# Patient Record
Sex: MALE | Race: WHITE | NOT HISPANIC OR LATINO | Employment: FULL TIME | URBAN - METROPOLITAN AREA
[De-identification: names, ages, dates, MRNs, and addresses within clinical notes are randomized per-mention and may not be internally consistent; named-entity substitution may affect disease eponyms.]

---

## 2017-01-13 ENCOUNTER — GENERIC CONVERSION - ENCOUNTER (OUTPATIENT)
Dept: OTHER | Facility: OTHER | Age: 47
End: 2017-01-13

## 2017-05-18 ENCOUNTER — ALLSCRIPTS OFFICE VISIT (OUTPATIENT)
Dept: OTHER | Facility: OTHER | Age: 47
End: 2017-05-18

## 2017-06-21 ENCOUNTER — ALLSCRIPTS OFFICE VISIT (OUTPATIENT)
Dept: OTHER | Facility: OTHER | Age: 47
End: 2017-06-21

## 2017-10-18 ENCOUNTER — GENERIC CONVERSION - ENCOUNTER (OUTPATIENT)
Dept: OTHER | Facility: OTHER | Age: 47
End: 2017-10-18

## 2017-11-21 ENCOUNTER — ALLSCRIPTS OFFICE VISIT (OUTPATIENT)
Dept: OTHER | Facility: OTHER | Age: 47
End: 2017-11-21

## 2017-11-21 DIAGNOSIS — M25.512 PAIN IN LEFT SHOULDER: ICD-10-CM

## 2017-11-22 NOTE — PROGRESS NOTES
Assessment    1  Mixed hyperlipidemia (272 2) (E78 2)   2  Impaired fasting glucose (790 21) (R73 01)   3  Pain in joint of left shoulder (719 41) (M25 512)   4  Chronic narcotic use (305 50) (F11 90)   5  Overweight (BMI 25 0-29 9) (278 02) (E66 3)   6  Hair loss (704 00) (L65 9)   7  Cold sore (054 9) (B00 1)    Plan  Cold sore    · ValACYclovir HCl - 1 GM Oral Tablet (Valtrex); Take 2 tablets by mouth  every 12hours  Hair loss    · Finasteride 1 MG Oral Tablet; TAKE 1 TABLET DAILY  Health Maintenance    · *VB-Depression Screening; Status:Complete;   Done: 51EBM0062 09:39AM  Pain in joint of left shoulder    · Hydrocodone-Acetaminophen 7 5-325 MG Oral Tablet; TAKE 1 TABLET Everytwelve hours PRN pain   · 1 - Stan WREN, Jenn Mark  (Orthopedic Surgery) Co-Management  *  Status: Active Requested for: 78ZBM4103  Care Summary provided  : Yes   · * XR SHOULDER 2+ VIEW LEFT; Status:Active; Requested for:21Nov2017;   SocHx: Chronic narcotic use, Impaired fasting glucose, Mixed hyperlipidemia,Overweight (BMI 25 0-29 9), Pain in joint of left shoulder    · (1) CBC/PLT/DIFF; Status:Active; Requested for:11May2018;    · (1) COMPREHENSIVE METABOLIC PANEL; Status:Active; Requested for:11May2018;    · (1) DRUG ABUSE SCREEN, URINE ROUTINE; Status:Active; Requested for:11May2018;    · (1) HEMOGLOBIN A1C; Status:Active; Requested for:11May2018;    · (1) HYDROCODONE AND METABOLITES, URINE; Status:Active; Requestedfor:11May2018;    · (1) LIPID PANEL FASTING W DIRECT LDL REFLEX; Status:Active; Requestedfor:11May2018; Discussion/Summary    Will check left shoulder xray and refer to Dr Ksenia Beckman  lab done on 10/13/2017 at [facility] vkkwsm458/307/51/141 elevated  Low fat diet  0 94 normalnormalnormalcurrent meds  flu shot  checked and it was appropriate  Pain agreement signed  in 6 months  Possible side effects of new medications were reviewed with the patient/guardian today  The treatment plan was reviewed with the patient/guardian   The patient/guardian understands and agrees with the treatment plan      Chief Complaint  Patient presents for a 6 month follow up      History of Present Illness  Pt is here by himself  control  Not on any statins  hgA1C 5 3 ysjehc58 7 today  loss---He is on finasteride for years which works OK  sore---use valtrex as needed  Not everyday  shoulder pain for years  The pain was in left anterior shoulder  It is constant, 8/10, takes vicodin 2 tabs per day and the pain will be 4/10, so he can function  Occasionally weakness/numbness/tingling of hands after exercise  of left shoulder surgery twice  Would like to see Dr Ksenia Beckman  every day smoking, alcohol or drugs  depression  Review of Systems   Constitutional: No fever or chills, feels well, no tiredness, no recent weight gain or weight loss  ENT: no complaints of earache, no hearing loss, no nosebleeds, no nasal discharge, no sore throat, no hoarseness  Cardiovascular: No complaints of slow heart rate, no fast heart rate, no chest pain, no palpitations, no leg claudication, no lower extremity  Respiratory: No complaints of shortness of breath, no wheezing, no cough, no SOB on exertion, no orthopnea or PND  Gastrointestinal: No complaints of abdominal pain, no constipation, no nausea or vomiting, no diarrhea or bloody stools  Musculoskeletal: No complaints of arthralgia, no myalgias, no joint swelling or stiffness, no limb pain or swelling  Active Problems  1  Chronic narcotic use (305 50) (F11 90)   2  Cold sore (054 9) (B00 1)   3  Hair loss (704 00) (L65 9)   4  Impaired fasting glucose (790 21) (R73 01)   5  Mixed hyperlipidemia (272 2) (E78 2)   6  Overweight (BMI 25 0-29 9) (278 02) (E66 3)   7  Pain in joint of left shoulder (719 41) (M25 512)   8  Pain medication agreement completed (V58 69) (Z02 89)    Family History  Mother    1  No pertinent family history  Father    2   No pertinent family history    Social History     · Former smoker (V15 82) (W62 700)    Current Meds   1  Amoxicillin-Pot Clavulanate 875-125 MG Oral Tablet; TAKE 1 TABLET EVERY 12 HOURS DAILY; Therapy: 21Jun2017 to (Chelsi Ramirezcorwin)  Requested for: 21Jun2017; Last GZ:19PSJ9120 Ordered   2  Finasteride 1 MG Oral Tablet; TAKE 1 TABLET DAILY; Therapy: 61GJC3027 to (Evaluate:14Nov2017)  Requested for: 48OHN1271; Last Rx:22Shg1269 Ordered   3  Hydrocodone-Acetaminophen 7 5-325 MG Oral Tablet; TAKE 1 TABLET Every twelve hours PRN pain; Therapy: 79NXX0862 to (Evaluate:47Oxp6466); Last Rx:16Nov2017 Ordered   4  ValACYclovir HCl - 1 GM Oral Tablet; Take 2 tablets by mouth  every 12 hours; Therapy: 93BNA6797 to (Evaluate:30Oct2017)  Requested for: 30Oct2017; Last Rx:30Oct2017 Ordered    Allergies  1  No Known Drug Allergies    Vitals  Vital Signs    Recorded: 21Nov2017 09:05AM   Temperature 97 9 F, Tympanic   Heart Rate 78, R Radial   Pulse Quality Regular, R Radial   Respiration Quality Rapid   Respiration 16   Systolic 970, LUE, Sitting   Diastolic 78, LUE, Sitting   Height 5 ft 10 in   Weight 207 lb    BMI Calculated 29 7   BSA Calculated 2 12   Pain Scale 0       Physical Exam   Constitutional  General appearance: No acute distress, well appearing and well nourished  Pulmonary  Respiratory effort: No increased work of breathing or signs of respiratory distress  Auscultation of lungs: Clear to auscultation, equal breath sounds bilaterally, no wheezes, no rales, no rhonci  Cardiovascular  Auscultation of heart: Normal rate and rhythm, normal S1 and S2, without murmurs  Examination of extremities for edema and/or varicosities: Normal    Carotid pulses: Normal    Abdomen  Abdomen: Non-tender, no masses  Liver and spleen: No hepatomegaly or splenomegaly  Lymphatic  Palpation of lymph nodes in neck: No lymphadenopathy  Musculoskeletal  Gait and station: Normal    Inspection/palpation of joints, bones, and muscles: Abnormal  -- Left shoulder anterior tender, no swollen or erythema  ROM limited  Signatures   Electronically signed by :  Moshe Michelle MD; Nov 21 2017  9:41AM EST                       (Author)

## 2017-12-18 ENCOUNTER — TRANSCRIBE ORDERS (OUTPATIENT)
Dept: ADMINISTRATIVE | Facility: HOSPITAL | Age: 47
End: 2017-12-18

## 2017-12-18 DIAGNOSIS — G89.29 CHRONIC LEFT SHOULDER PAIN: Primary | ICD-10-CM

## 2017-12-18 DIAGNOSIS — M25.512 CHRONIC LEFT SHOULDER PAIN: Primary | ICD-10-CM

## 2017-12-26 ENCOUNTER — GENERIC CONVERSION - ENCOUNTER (OUTPATIENT)
Dept: OTHER | Facility: OTHER | Age: 47
End: 2017-12-26

## 2017-12-26 ENCOUNTER — HOSPITAL ENCOUNTER (OUTPATIENT)
Dept: MRI IMAGING | Facility: HOSPITAL | Age: 47
Discharge: HOME | End: 2017-12-26
Payer: COMMERCIAL

## 2017-12-26 DIAGNOSIS — M25.512 CHRONIC LEFT SHOULDER PAIN: ICD-10-CM

## 2017-12-26 DIAGNOSIS — G89.29 CHRONIC LEFT SHOULDER PAIN: ICD-10-CM

## 2017-12-26 PROCEDURE — 73221 MRI JOINT UPR EXTREM W/O DYE: CPT

## 2018-01-03 ENCOUNTER — ALLSCRIPTS OFFICE VISIT (OUTPATIENT)
Dept: OTHER | Facility: OTHER | Age: 48
End: 2018-01-03

## 2018-01-04 NOTE — PROGRESS NOTES
Assessment   1  Superior glenoid labrum lesion of left shoulder, initial encounter (840 7) (P02 035A)    Plan      Una Ivan has a significant tear in the left shoulder superior labrum which I feel is necessary to be corrected with surgery  We discussed the  arthroscopic Biceps tenodesis procedure in detail  Typically in [place] 1574 age group patients will respond better with the tenodesis verses a labral repair and this was included in our discussion  Risks of the surgery are inclusive of but not limited to bleeding, nerve injury, infection, stiffness, persistent weakness, worsening of symptoms, need for further surgery, failure of achieving full strength and ability, and blood clot  Basal stated he understood the risks and benefits of the surgery  he does not want to make a decision now but will contact our office should he decide to have the surgery  He will meet with Ashlee Garcia our surgery scheduler today to least have contact information should he decide to move forward  Discussion/Summary   The patient was counseled regarding diagnostic results,-- instructions for management,-- risk factor reductions,-- prognosis,-- patient and family education,-- impressions,-- risks and benefits of treatment options,-- importance of compliance with treatment  Chief Complaint   1  Shoulder Pain    History of Present Illness     Una Ivan  is a 62-year-old male visiting today for chronic left shoulder pain  Una Ivan has a history of left shoulder instability which was surgically repaired 10 years ago and then followed by a labral repair performed approximately 2 years  following the initial surgery  He states that he began to experience a return of pain in the left shoulder and that has gradually increased over the past 2 years  His chief complaint is of the sharp pain in the anterior aspect of the shoulder that will radiate posteriorly  This will increase with overhead movements or from performing pressing exercises    The pain has progressed now to a persistent moderate ache about the shoulder that is described as global and will radiate intermittently into the left upper arm  He is only somewhat better with rest    He was written for an MRI of the left shoulder by another provider and we are able to review those results today  Review of Systems        Constitutional: No fever or chills, feels well, no tiredness, no recent weight loss or weight gain  Eyes: No complaints of red eyes, no eyesight problems  ENT: no complaints of loss of hearing, no nosebleeds, no sore throat  Cardiovascular: No complaints of chest pain, no palpitations, no leg claudication or lower extremity edema  Respiratory: No complaints of shortness of breath, no wheezing, no cough  Gastrointestinal: No complaints of abdominal pain, no constipation, no nausea or vomiting, no diarrhea or bloody stools  Genitourinary: No complaints of dysuria or incontinence, no hesitancy, no nocturia  Musculoskeletal: as noted in HPI  Integumentary: No complaints of skin rash or lesion, no itching or dry skin, no skin wounds  Neurological: No complaints of headache, no confusion, no numbness or tingling, no dizziness  Psychiatric: No suicidal thoughts, no anxiety, no depression  Endocrine: No muscle weakness, no frequent urination, no excessive thirst, no feelings of weakness  ROS reviewed  Active Problems   1  Chronic narcotic use (305 50) (F11 90)   2  Cold sore (054 9) (B00 1)   3  Hair loss (704 00) (L65 9)   4  Impaired fasting glucose (790 21) (R73 01)   5  Mixed hyperlipidemia (272 2) (E78 2)   6  Overweight (BMI 25 0-29 9) (278 02) (E66 3)   7  Pain in joint of left shoulder (719 41) (M25 512)   8  Pain medication agreement completed (V58 69) (Z02 89)    Past Medical History      The active problems and past medical history were reviewed and updated today        Surgical History    · History of Shoulder Surgery     The surgical history was reviewed and updated today  Family History   Mother    · No pertinent family history  Father    · No pertinent family history     The family history was reviewed and updated today  Social History    · Former smoker (U24 03) (J75 017)  The social history was reviewed and updated today  The social history was reviewed and is unchanged  Current Meds    1  Finasteride 1 MG Oral Tablet; TAKE 1 TABLET DAILY; Therapy: 84IAU7182 to (Evaluate:14Nov2017)  Requested for: 21Nov2017; Last     Rx:32Mra0348 Ordered   2  Hydrocodone-Acetaminophen 7 5-325 MG Oral Tablet; TAKE 1 TABLET Every twelve     hours PRN pain; Therapy: 33IKN2514 to (Evaluate:14Jan2018); Last Rx:80Gxw1515 Ordered     The medication list was reviewed and updated today  Allergies   1  No Known Drug Allergies    Vitals    Recorded: 32LJS2981 11:40AM   Heart Rate 68   Systolic 126   Diastolic 72   Height 5 ft 10 in   Weight 206 lb 8 oz   BMI Calculated 29 63   BSA Calculated 2 12     Physical Exam      ROM: Full except as noted: Forward flexion: painful  Motor: Normal except as noted: 4/5 abduction,-- 5/5 internal rotation-- and-- 5/5 external rotation  Special Tests: Negative except positive Empty Can test-- and-- positive Cavazos's test, but-- negative Speed's test,-- negative Belly Press test,-- negative Anterior Load and Shift-- and-- negative Posterior Load and Shift  Internal rotation by spine level: painful restricted AROM L1 degrees  Left Shoulder Appearance[de-identified] Normal  Tenderness: None        Constitutional - General appearance: Normal       Musculoskeletal - Gait and station: Normal -- Digits and nails: Normal -- Muscle strength/tone: Normal -- Upper extremity compartments: Normal       Cardiovascular - Pulses: Normal -- Examination of extremities for edema and/or varicosities: Normal       Skin - Skin and subcutaneous tissue: Normal       Neurologic - Sensation: Normal -- Upper extremity peripheral neuro exam: Normal       Psychiatric - Orientation to person, place, and time: Normal -- Mood and affect: Normal       Eyes      Conjunctiva and lids: Normal        Pupils and irises: Normal        Results/Data   I personally reviewed the films/images/results in the office today  My interpretation follows  MRI Review MRI of the left shoulder was reviewed today and demonstrates a large slap tear from the 10 to 2 o'clock position  Attending Note   Scribe Attestation:      Scribe Attestation Coleman Madrigal ATC am acting as a scribe in the presence of the attending physician while the attending physician examines the patient  Physician Attestation:      King Tessa WREN personally performed the services described in this documentation as scribed in my presence, and it is both accurate and complete        Future Appointments      Date/Time Provider Specialty Site   05/23/2018 10:00 AM Maggi Rodriguez MD Family Medicine Formerly named [facility]    Electronically signed by : LINDSAY Gaviria; Dez  3 2018 12:35PM EST                       (Author)     Electronically signed by : ANNIKA Seals ; Dez  3 2018  1:55PM EST                       (Author)

## 2018-01-11 NOTE — RESULT NOTES
Message  Received lab result done on 1/10/2017 at [facility]  PSA 0 49 normal      Signatures   Electronically signed by :  Whit Marquez MD; Jan 13 2017  4:59PM EST                       (Author)

## 2018-01-11 NOTE — RESULT NOTES
Message  Received lab results done on 11/22/2016 at [facility]  kidney function normal  liver enzymes normal  electrolytes normal  hgA1C 5 3 normal  total cholesterol 251 still elevated  triglycerides 244 elevated  HDL 47   elevated  Pt should follow low fat diet  I would suggest simvastatin for hyperlipidemia  If pt agreed, let me know  Signatures   Electronically signed by :  Fam Chandra MD; Nov 23 2016  2:44PM EST                       (Author)

## 2018-01-14 VITALS
TEMPERATURE: 97.9 F | RESPIRATION RATE: 16 BRPM | SYSTOLIC BLOOD PRESSURE: 110 MMHG | HEIGHT: 70 IN | DIASTOLIC BLOOD PRESSURE: 78 MMHG | WEIGHT: 207 LBS | BODY MASS INDEX: 29.63 KG/M2 | HEART RATE: 78 BPM

## 2018-01-14 VITALS
SYSTOLIC BLOOD PRESSURE: 128 MMHG | DIASTOLIC BLOOD PRESSURE: 86 MMHG | BODY MASS INDEX: 30.06 KG/M2 | HEIGHT: 70 IN | RESPIRATION RATE: 16 BRPM | HEART RATE: 68 BPM | TEMPERATURE: 96.9 F | WEIGHT: 210 LBS

## 2018-01-15 VITALS
SYSTOLIC BLOOD PRESSURE: 108 MMHG | RESPIRATION RATE: 16 BRPM | WEIGHT: 204.8 LBS | HEIGHT: 70 IN | HEART RATE: 56 BPM | TEMPERATURE: 97 F | DIASTOLIC BLOOD PRESSURE: 64 MMHG | BODY MASS INDEX: 29.32 KG/M2

## 2018-01-16 NOTE — PROGRESS NOTES
Assessment    1  Cold sore (054 9) (B00 1)    Plan  Cold sore    · ValACYclovir HCl - 1 GM Oral Tablet (Valtrex); TAKE 2 TABLET Every twelve hours  Pain in joint of left shoulder    · Hydrocodone-Acetaminophen 7 5-325 MG Oral Tablet; TAKE 1 TABLET Every  twelve hours PRN pain    Discussion/Summary    Patient was given a script for Valtrex  He was instructed how to properly use this medication  He was informed that he needs to take it at the first sign of a cold sore outbreak for it to properly work  He may continue to use Abreva OTC as needed for preexisting cold sore  Try to decrease stress  Wear sunscreen/ChapStick with sunscreen when outdoors  Call office if symptoms worsen or do not improve  Possible side effects of new medications were reviewed with the patient/guardian today  The treatment plan was reviewed with the patient/guardian  The patient/guardian understands and agrees with the treatment plan      Chief Complaint  Patient here for cold sores 2 x in one month      History of Present Illness  HPI: C/o cold sore that developed about 2 days ago on left mouth corner  Reports getting cold sores on lips 2-3 times per year  Has been getting them for the past 5 years or so  Reports he can tell when he is getting an outbreak and experiences a tingling sensation in the area of the outbreak  No fevers or chills, sore throat, cough, nasal congestion  Is currently stressed at his job and reports that cold sores are usually brought on by stress  Uses Abreva OTC which helps slightly  Review of Systems    Constitutional: as noted in HPI    ENT: as noted in HPI  Cardiovascular: no chest pain, no palpitations and no lower extremity edema  Respiratory: no shortness of breath, no cough and no wheezing  Gastrointestinal: no nausea, no vomiting and no diarrhea  Musculoskeletal: no arthralgias and no myalgias  Integumentary: as noted in HPI  Neurological: no headache  ROS reviewed  Active Problems    1  Chronic narcotic use (305 50) (F11 90)   2  Impaired fasting glucose (790 21) (R73 01)   3  Mixed hyperlipidemia (272 2) (E78 2)   4  Pain in joint of left shoulder (719 41) (M25 512)   5  Pain medication agreement completed (V58 69) (N65 514)    Past Medical History  Active Problems And Past Medical History Reviewed: The active problems and past medical history were reviewed and updated today  Family History    1  No pertinent family history    2  No pertinent family history    Social History    · Former smoker (M57 18) (I69 493)  The social history was reviewed and updated today  The social history was reviewed and is unchanged  Current Meds   1  Hydrocodone-Acetaminophen 7 5-325 MG Oral Tablet; TAKE 1 TABLET Every twelve   hours PRN pain; Therapy: 98BRS4087 to (Evaluate:38Zwz0321); Last Rx:21Jan2016 Ordered    The medication list was reviewed and updated today  Allergies    1  No Known Drug Allergies    Vitals   Recorded: 17MKT3295 03:56PM Recorded: 88ZCJ2718 03:40PM   Temperature  97 9 F   Heart Rate  76   Respiration  16   Systolic 335 334   Diastolic 86 90   Height  5 ft 10 in   Weight  202 lb 8 oz   BMI Calculated  29 06   BSA Calculated  2 1   Pain Scale  0     Physical Exam    Constitutional   General appearance: No acute distress, well appearing and well nourished  Eyes   Conjunctiva and lids: No swelling, erythema, or discharge  Ears, Nose, Mouth, and Throat   External inspection of ears and nose: Normal     Otoscopic examination: Tympanic membrance translucent with normal light reflex  Canals patent without erythema  Nasal mucosa, septum, and turbinates: Normal without edema or erythema  Oropharynx: Normal with no erythema, edema, exudate or lesions  Pulmonary   Respiratory effort: No increased work of breathing or signs of respiratory distress      Auscultation of lungs: Clear to auscultation, equal breath sounds bilaterally, no wheezes, no rales, no rhonci  Cardiovascular   Auscultation of heart: Normal rate and rhythm, normal S1 and S2, without murmurs  Examination of extremities for edema and/or varicosities: Normal     Carotid pulses: Normal     Abdomen   Abdomen: Non-tender, no masses  Liver and spleen: No hepatomegaly or splenomegaly  Lymphatic   Palpation of lymph nodes in neck: No lymphadenopathy  Skin   Examination of the skin for lesions: Abnormal   small vesicular lesion on left corn of mouth with erythematous base  Attending Note  Collaborating Physician Note: Collaborating Note: I agree with the Advanced Practitioner note  Future Appointments    Date/Time Provider Specialty Site   04/27/2016 10:20 AM Alfredo Wilkins, [address] [place] [address]   Electronically signed by : Lorenzo Echeverria, [address] [place] [address]; Feb  3 2016  4:06PM EST                       (Author)    Electronically signed by :  Osman Gonzalez MD; Feb  3 2016  5:47PM EST                       (Author)

## 2018-01-17 NOTE — RESULT NOTES
Message  Reviewed lab done on 10/13/2017 at [facility]  CMP ok  hgA1C 5 3 normal  Lipid 253/307/51/141 elevated  PSA 0 94 normal  TSH normal  CBC normal  DW pt on 11/21/2017 OV  Signatures   Electronically signed by :  Keren Rubin MD; Oct 18 2017  9:50PM EST                       (Author)

## 2018-01-23 VITALS
WEIGHT: 206.5 LBS | HEART RATE: 68 BPM | HEIGHT: 70 IN | SYSTOLIC BLOOD PRESSURE: 115 MMHG | DIASTOLIC BLOOD PRESSURE: 72 MMHG | BODY MASS INDEX: 29.56 KG/M2

## 2018-01-23 NOTE — RESULT NOTES
Verified Results  * MRI SHOULDER LEFT WO CONTRAST [id number] 10:28AM Piotr De La Fuentetz     Test Name Result Flag Reference   MRI SHOULDER LEFT WO CONTRAST (Report)     MRI LEFT SHOULDER     INDICATION: Anterior left shoulder pain  There is no history of trauma  Patient has had left shoulder surgery twice  COMPARISON: None  TECHNIQUE:  The following MR sequences were obtained of the left shoulder: Localizer, axial GRE/PD fat sat, oblique coronal T2 fat sat, oblique sagittal T1/T2 fat sat  Images were acquired on a 1 5 Es unit  Gadolinium was not used  FINDINGS:     SUBCUTANEOUS TISSUES: Normal     JOINT EFFUSION: None  ACROMION PROCESS: Normal      ROTATOR CUFF: Intact  SUBACROMIAL/SUBDELTOID BURSA: Normal       LONG HEAD OF BICEPS TENDON: Normal      GLENOID LABRUM: There is a glenoid labral SLAP tear extending from the posterior-superior labrum at the 2 o'clock position counterclockwise to the anterior-superior labrum at the 10 o'clock position  (Series 5 images 10 through 14, and series 4 images    10 through 13 )     GLENOHUMERAL JOINT: Intact  ACROMIOCLAVICULAR JOINT: Normal      BONE MARROW SIGNAL: Normal        IMPRESSION:     There is a glenoid labral SLAP tear extending from the posterior-superior labrum at the 2 o'clock position counterclockwise to the anterior-superior labrum at the 10 o'clock position   (Series 5 images 10 through 14, and series 4 images 10 through 13 )       Workstation performed: [id number]     Signed by:   Karma Huff MD   12/26/17

## 2018-02-05 RX ORDER — HYDROCODONE BITARTRATE AND ACETAMINOPHEN 5; 325 MG/1; MG/1
1 TABLET ORAL EVERY 6 HOURS PRN
COMMUNITY
End: 2018-02-19 | Stop reason: SDUPTHER

## 2018-02-05 RX ORDER — MULTIVIT-MIN/IRON/FOLIC ACID/K 18-600-40
CAPSULE ORAL
COMMUNITY

## 2018-02-05 RX ORDER — MULTIVITAMIN
1 TABLET ORAL DAILY
COMMUNITY

## 2018-02-05 RX ORDER — CHLORAL HYDRATE 500 MG
1000 CAPSULE ORAL DAILY
COMMUNITY

## 2018-02-05 RX ORDER — FINASTERIDE 1 MG/1
1 TABLET, FILM COATED ORAL DAILY
COMMUNITY
End: 2018-06-12 | Stop reason: SDUPTHER

## 2018-02-05 RX ORDER — LANOLIN ALCOHOL/MO/W.PET/CERES
1 CREAM (GRAM) TOPICAL EVERY MORNING
COMMUNITY

## 2018-02-05 NOTE — PRE-PROCEDURE INSTRUCTIONS
My Surgical Experience    The following information was developed to assist you to prepare for your operation  What do I need to do before coming to the hospital?   Arrange for a responsible person to drive you to and from the hospital    Arrange care for your children at home  Children are not allowed in the recovery areas of the hospital   Plan to wear clothing that is easy to put on and take off  If you are having shoulder surgery, wear a shirt that buttons or zippers in the front  Bathing  o Shower the evening before and the morning of your surgery with an antibacterial soap  Please refer to the Pre Op Showering Instructions for Surgery Patients Sheet   o Remove nail polish and all body piercing jewelry  o Do not shave any body part for at least 24 hours before surgery-this includes face, arms, legs and upper body  Food  o Nothing to eat or drink after midnight the night before your surgery  This includes candy and chewing gum  o Exception: If your surgery is after 12:00pm (noon), you may have clear liquids such as 7-Up®, ginger ale, apple or cranberry juice, Jell-O®, water, or clear broth until 8:00 am  o Do not drink milk or juice with pulp on the morning before surgery  o Do not drink alcohol 24 hours before surgery  Medicine  o Follow instructions you received from your surgeon about which medicines you may take on the day of surgery  o If instructed to take medicine on the morning of surgery, take pills with just a small sip of water  Call your prescribing doctor for specific infroamtion on what to do if you take insulin    What should I bring to the hospital?    Bring:  Salvador Javier or a walker, if you have them, for foot or knee surgery   A list of the daily medicines, vitamins, minerals, herbals and nutritional supplements you take   Include the dosages of medicines and the time you take them each day   Glasses, dentures or hearing aids   Minimal clothing; you will be wearing hospital sleepwear   Photo ID; required to verify your identity   If you have a Living Will or Power of , bring a copy of the documents   If you have an ostomy, bring an extra pouch and any supplies you use    Do not bring   Medicines or inhalers   Money, valuables or jewelry    What other information should I know about the day of surgery?  Notify your surgeons if you develop a cold, sore throat, cough, fever, rash or any other illness   Report to the Ambulatory Surgical/Same Day Surgery Unit  You will be instructed to stop at Registration only if you My Surgical Experience    The following information was developed to assist you to prepare for your operation  What do I need to do before coming to the hospital?  Arrange for a responsible person to drive you to and from the hospital   Arrange care for your children at home  Children are not allowed in the recovery areas of the hospital  Plan to wear clothing that is easy to put on and take off  If you are having shoulder surgery, wear a shirt that buttons or zippers in the front  Bathing  Shower the evening before and the morning of your surgery with an antibacterial soap  Please refer to the Pre Op Showering Instructions for Surgery Patients Sheet  Remove nail polish and all body piercing jewelry  Do not shave any body part for at least 24 hours before surgery-this includes face, arms, legs and upper body  Food  Nothing to eat or drink after midnight the night before your surgery   This includes candy and chewing gum  Exception: If your surgery is after 12:00pm (noon), you may have clear liquids such as 7-Up®, ginger ale, apple or cranberry juice, Jell-O®, water, or clear broth until 8:00 am  Do not drink milk or juice with pulp on the morning before surgery  Do not drink alcohol 24 hours before surgery  Medicine  Follow instructions you received from your surgeon about which medicines you may take on the day of surgery  If instructed to take medicine on the morning of surgery, take pills with just a small sip of water  Call your prescribing doctor for specific infroamtion on what to do if you take insulin    What should I bring to the hospital?    Bring:  Crutches or a walker, if you have them, for foot or knee surgery  A list of the daily medicines, vitamins, minerals, herbals and nutritional supplements you take  Include the dosages of medicines and the time you take them each day  Glasses, dentures or hearing aids  Minimal clothing; you will be wearing hospital sleepwear  Photo ID; required to verify your identity  If you have a Living Will or Power of , bring a copy of the documents  If you have an ostomy, bring an extra pouch and any supplies you use    Do not bring  Medicines or inhalers  Money, valuables or jewelry    What other information should I know about the day of surgery? Notify your surgeons if you develop a cold, sore throat, cough, fever, rash or any other illness  Report to the Ambulatory Surgical/Same Day Surgery Unit  You will be instructed to stop at Registration only if you have not been pre-registered  Inform your  fi they do not stay that they will be asked by the staff to leave a phone number where they can be reached  Be available to be reached before surgery  In the event the operating room schedule changes, you may be asked to come in earlier or later than expected    *It is important to tell your doctor and others involved in your health care if you are taking or have been taking any non-prescription drugs, vitamins, minerals, herbals or other nutritional supplements  Any of these may interact with some food or medicines and cause a reaction       have not been pre-registered   Inform your  fi they do not stay that they will be asked by the staff to leave a phone number where they can be reached   Be available to be reached before surgery   In the event the operating room schedule changes, you may be asked to come in earlier or later than expected    *It is important to tell your doctor and others involved in your health care if you are taking or have been taking any non-prescription drugs, vitamins, minerals, herbals or other nutritional supplements  Any of these may interact with some food or medicines and cause a reaction      Pre-Surgery Instructions:   Medication Instructions    Cholecalciferol (VITAMIN D) 2000 units CAPS Instructed patient per Anesthesia Guidelines   finasteride (PROPECIA) 1 MG tablet Instructed patient per Anesthesia Guidelines   glucosamine-chondroitin 500-400 MG tablet Instructed patient per Anesthesia Guidelines   HYDROcodone-acetaminophen (NORCO) 5-325 mg per tablet Instructed patient per Anesthesia Guidelines   Multiple Vitamin (MULTIVITAMIN) tablet Instructed patient per Anesthesia Guidelines   Omega-3 Fatty Acids (FISH OIL) 1,000 mg Instructed patient per Anesthesia Guidelines  To take finasteride a m   Of surgery

## 2018-02-08 ENCOUNTER — HOSPITAL ENCOUNTER (OUTPATIENT)
Facility: HOSPITAL | Age: 48
Setting detail: OUTPATIENT SURGERY
Discharge: HOME | End: 2018-02-08
Attending: ORTHOPAEDIC SURGERY | Admitting: ORTHOPAEDIC SURGERY
Payer: COMMERCIAL

## 2018-02-08 ENCOUNTER — ANESTHESIA (OUTPATIENT)
Dept: PERIOP | Facility: HOSPITAL | Age: 48
End: 2018-02-08
Payer: COMMERCIAL

## 2018-02-08 ENCOUNTER — ANESTHESIA EVENT (OUTPATIENT)
Dept: PERIOP | Facility: HOSPITAL | Age: 48
End: 2018-02-08
Payer: COMMERCIAL

## 2018-02-08 VITALS
DIASTOLIC BLOOD PRESSURE: 62 MMHG | OXYGEN SATURATION: 99 % | SYSTOLIC BLOOD PRESSURE: 128 MMHG | BODY MASS INDEX: 28.7 KG/M2 | WEIGHT: 200 LBS | RESPIRATION RATE: 16 BRPM | HEART RATE: 71 BPM | TEMPERATURE: 97 F

## 2018-02-08 PROCEDURE — C1713 ANCHOR/SCREW BN/BN,TIS/BN: HCPCS | Performed by: ORTHOPAEDIC SURGERY

## 2018-02-08 PROCEDURE — 29828 SHO ARTHRS SRG BICP TENODSIS: CPT | Performed by: ORTHOPAEDIC SURGERY

## 2018-02-08 PROCEDURE — 29828 SHO ARTHRS SRG BICP TENODSIS: CPT | Performed by: PHYSICIAN ASSISTANT

## 2018-02-08 DEVICE — ANCHOR SUTURE TENODESIS BIOCOMPOSITE: Type: IMPLANTABLE DEVICE | Site: SHOULDER | Status: FUNCTIONAL

## 2018-02-08 RX ORDER — ONDANSETRON 2 MG/ML
4 INJECTION INTRAMUSCULAR; INTRAVENOUS ONCE AS NEEDED
Status: DISCONTINUED | OUTPATIENT
Start: 2018-02-08 | End: 2018-02-08 | Stop reason: HOSPADM

## 2018-02-08 RX ORDER — HYDROMORPHONE HCL 110MG/55ML
0.5 PATIENT CONTROLLED ANALGESIA SYRINGE INTRAVENOUS
Status: DISCONTINUED | OUTPATIENT
Start: 2018-02-08 | End: 2018-02-08 | Stop reason: HOSPADM

## 2018-02-08 RX ORDER — FENTANYL CITRATE/PF 50 MCG/ML
25 SYRINGE (ML) INJECTION AS NEEDED
Status: DISCONTINUED | OUTPATIENT
Start: 2018-02-08 | End: 2018-02-08 | Stop reason: HOSPADM

## 2018-02-08 RX ORDER — EPINEPHRINE 1 MG/ML(1)
AMPUL (ML) INJECTION AS NEEDED
Status: DISCONTINUED | OUTPATIENT
Start: 2018-02-08 | End: 2018-02-08 | Stop reason: SURG

## 2018-02-08 RX ORDER — PROPOFOL 10 MG/ML
INJECTION, EMULSION INTRAVENOUS AS NEEDED
Status: DISCONTINUED | OUTPATIENT
Start: 2018-02-08 | End: 2018-02-08 | Stop reason: SURG

## 2018-02-08 RX ORDER — SODIUM CHLORIDE 9 MG/ML
75 INJECTION, SOLUTION INTRAVENOUS CONTINUOUS
Status: DISCONTINUED | OUTPATIENT
Start: 2018-02-08 | End: 2018-02-08 | Stop reason: HOSPADM

## 2018-02-08 RX ORDER — DIPHENHYDRAMINE HYDROCHLORIDE 50 MG/ML
12.5 INJECTION INTRAMUSCULAR; INTRAVENOUS ONCE AS NEEDED
Status: DISCONTINUED | OUTPATIENT
Start: 2018-02-08 | End: 2018-02-08 | Stop reason: HOSPADM

## 2018-02-08 RX ORDER — DEXAMETHASONE SODIUM PHOSPHATE 4 MG/ML
INJECTION, SOLUTION INTRA-ARTICULAR; INTRALESIONAL; INTRAMUSCULAR; INTRAVENOUS; SOFT TISSUE AS NEEDED
Status: DISCONTINUED | OUTPATIENT
Start: 2018-02-08 | End: 2018-02-08 | Stop reason: SURG

## 2018-02-08 RX ORDER — MAGNESIUM HYDROXIDE 1200 MG/15ML
LIQUID ORAL AS NEEDED
Status: DISCONTINUED | OUTPATIENT
Start: 2018-02-08 | End: 2018-02-08 | Stop reason: HOSPADM

## 2018-02-08 RX ORDER — ROPIVACAINE HYDROCHLORIDE 5 MG/ML
INJECTION, SOLUTION EPIDURAL; INFILTRATION; PERINEURAL AS NEEDED
Status: DISCONTINUED | OUTPATIENT
Start: 2018-02-08 | End: 2018-02-08 | Stop reason: SURG

## 2018-02-08 RX ORDER — SODIUM CHLORIDE, SODIUM LACTATE, POTASSIUM CHLORIDE, CALCIUM CHLORIDE 600; 310; 30; 20 MG/100ML; MG/100ML; MG/100ML; MG/100ML
75 INJECTION, SOLUTION INTRAVENOUS CONTINUOUS
Status: DISCONTINUED | OUTPATIENT
Start: 2018-02-08 | End: 2018-02-08 | Stop reason: HOSPADM

## 2018-02-08 RX ORDER — PROPOFOL 10 MG/ML
INJECTION, EMULSION INTRAVENOUS CONTINUOUS PRN
Status: DISCONTINUED | OUTPATIENT
Start: 2018-02-08 | End: 2018-02-08 | Stop reason: SURG

## 2018-02-08 RX ORDER — FENTANYL CITRATE 50 UG/ML
INJECTION, SOLUTION INTRAMUSCULAR; INTRAVENOUS AS NEEDED
Status: DISCONTINUED | OUTPATIENT
Start: 2018-02-08 | End: 2018-02-08 | Stop reason: SURG

## 2018-02-08 RX ORDER — MIDAZOLAM HYDROCHLORIDE 1 MG/ML
INJECTION INTRAMUSCULAR; INTRAVENOUS AS NEEDED
Status: DISCONTINUED | OUTPATIENT
Start: 2018-02-08 | End: 2018-02-08 | Stop reason: SURG

## 2018-02-08 RX ADMIN — PROPOFOL 120 MCG/KG/MIN: 10 INJECTION, EMULSION INTRAVENOUS at 12:11

## 2018-02-08 RX ADMIN — ROPIVACAINE HYDROCHLORIDE 30 ML: 5 INJECTION, SOLUTION EPIDURAL; INFILTRATION; PERINEURAL at 11:50

## 2018-02-08 RX ADMIN — FENTANYL CITRATE 50 MCG: 50 INJECTION, SOLUTION INTRAMUSCULAR; INTRAVENOUS at 12:21

## 2018-02-08 RX ADMIN — MIDAZOLAM HYDROCHLORIDE 2 MG: 1 INJECTION, SOLUTION INTRAMUSCULAR; INTRAVENOUS at 12:05

## 2018-02-08 RX ADMIN — FENTANYL CITRATE 50 MCG: 50 INJECTION, SOLUTION INTRAMUSCULAR; INTRAVENOUS at 12:11

## 2018-02-08 RX ADMIN — PROPOFOL 100 MG: 10 INJECTION, EMULSION INTRAVENOUS at 12:11

## 2018-02-08 RX ADMIN — CEFAZOLIN SODIUM 2000 MG: 2 SOLUTION INTRAVENOUS at 12:11

## 2018-02-08 RX ADMIN — ROPIVACAINE HYDROCHLORIDE: 5 INJECTION, SOLUTION EPIDURAL; INFILTRATION; PERINEURAL at 13:06

## 2018-02-08 RX ADMIN — DEXAMETHASONE SODIUM PHOSPHATE 4 MG: 4 INJECTION, SOLUTION INTRA-ARTICULAR; INTRALESIONAL; INTRAMUSCULAR; INTRAVENOUS; SOFT TISSUE at 11:50

## 2018-02-08 RX ADMIN — EPINEPHRINE 0 MG: 1 INJECTION, SOLUTION INTRAMUSCULAR; SUBCUTANEOUS at 11:50

## 2018-02-08 RX ADMIN — SODIUM CHLORIDE: 0.9 INJECTION, SOLUTION INTRAVENOUS at 11:30

## 2018-02-08 NOTE — DISCHARGE INSTRUCTIONS
ON-[facility] PAIN BALL INSTRUCTIONS    · You may refer to the patient information booklet for basic operating instructions  · Your pain ball is set to automatically deliver medication continuously to provide you with pain relied  · The ball will last on average between 2-3 days  · You may also take your pain medication as prescribed by your physician as needed  It is safe to take your pain pills while the pain pump is working  · It is important that you protect the limb that you have had surgery on from extreme hot and cold  This limb may be completely or partially numb from you block in addition to the pain pump,   · Never rest your affected limb on a very hard surface as this may result in nerve compression  Always rest your limb on soft pillows or cushions  · Keep dressing DRY  DO NOT SHOWER until the catheter has been removed  · Please DO NOT DRIVE OR OPERATE AND HEAVY MACHINERY UNTIL PAIN PUMP IS REMOVED and until released by your surgeon  · Try to keep the strap of the pouch away from the dressing so it does not displace the catheter  · Some leakage from the dressing over the catheter is normal   If your pain is well controlled, simply tape some guaze of a small towel around the edge of the dressing to absorb the excess fluid  Turning down the rate of your pump will decrease leakage  If you experience an increase in pain with this leakage then it is likely that your cathter has dislodged and you should call your anesthesiologist   · To call the anesthesiologist dial [phone] and ask for the anesthesiologist on call for any of the following  · Breakthrough pain that is uncontrolled even after adjustment of the pain pump and taking a pain pill  · Swelling or a lump around the catheter site  · Excessive tenderness redness or drainage around the catheter site    · Lightheadedness, dizziness, a metallic taste in your mouth, or numbness/tingling around your mouth, restlessness, blurred vision, ringing in the ears or excessive drowsiness   IMMEDIATELY CLAMP THE PAIN PUMP TUBING AND CALL THE ANESTHESIOLOGIST  · It is not uncommon to see a little blood on the dressing  If there is continuous oozing of blood from the dressing call the anesthesiologist   · When the pain pump is finished and removed, expect for the numbness and tingling to resolve within 24 hours  If it does not, call the anesthesiologist   · We are available by phone as needed when it comes time to take out the catheter if you need assistance  TO REMOVE THE PAIN PUMP AND CATHETER  · Wash your hands  · Peel off the tape and the clear dressing over the catheter  · Pull the pain catheter out if it did not come out when the dressing was removed  · Apply pressure for about 5 minutes and then apply a band-aide  · Inspect the site 15 minutes after removal of the catheter and check for swelling or bleeding  · If the catheter does not come out easily call the anesthesiologist,  · The pain pump is disposable and can be discarded in you household garbage          For any additional; questions or concerns regarding your pain pump, please call the anesthesiologist     DO NOT call the surgeon for issues related to the pain pump  Instruction Sheet following shoulder arthroscopy     Sling:   Wear your sling for 48 hours after your surgery  You may use your arm as tolerated for activities of daily living once the sling is discontinued  You should avoid overhead activities with your arm  Additionally, you should not carry anything heavier than a pencil in your hand  Dressing:   Remove all cotton and yellow gauze dressings 48 hours after your surgery  Until then, keep your dressings clean and dry  If in place, leave the steri-strips on your incision (the small pieces of white tape), then cover all incisions with large Band-aids  Showering: You may shower 48 hours after surgery  Please use CAUTION!! Be careful not to slip and fall   The effects of anesthesia and/or medication may make you drowsy or light-headed  Do not soak in a bathtub, hot tub, or pool until the doctor tells you it is O K  to do so  Once you are done showering pat the incision dry and cover all incisions with large Band-aids  Ice:   You can ice the shoulder to reduce swelling and discomfort  Do not ice the shoulder more than 20 minutes at a time  Let the shoulder warm up before reapplication  Avoid getting your incisions wet  Follow-up visit:   You need to see the doctor 7-10 days following surgery for your first post-op visit  At that time your sutures (stitches) will be removed  You will be given a prescription to begin physical therapy  Pendulum exercises should be begun on the first postoperative day  Common Concerns:   Bruising and/or swelling of the shoulder region are common after surgery  To relieve this discomfort it is best to ice the shoulder  You may also get swelling in the hand, which is also common after surgery  Please call if:   1  Any oozing or redness of the wound, fevers (>101  5oF), or chills  2  Any difficulty breathing or heaviness in the chest      REMEMBER - these are only guidelines  If you have any questions or concerns please do not hesitate to call the office at any time [phone]     ? [id number]  ? Continuous Nerve Block Catheter Post-Operative Discharge Instructions  ? ? Your anesthesiologist has performed a regional nerve block and placed a catheter with a pain pump to supplement your pain control after surgery  This may not take away all of your pain  You may take additional oral medications as prescribed by your surgeon  The following is a brief overview of the instructions you and your family received at the time of your discharge  Please also refer to the [facility] patient guidelines brochure for additional information  ?   ? Overview:  ? The pain pump will not cause nausea or sleepiness   It contains a numbing medication, ropivacaine  The amount of pain relief patients get from their catheters will vary significantly from patient to patient  After the first 12-18 hours your arm will not be is not as numb as when you left the hospital  At this time you may get soreness and some movement returning  If you begin to have pain or soreness while your pump is active, you should start taking the prescription pain medication your surgeon has written for you  It is very important to stay ahead of your pain  Your pain pump is designed to work in conjunction with your oral pain medication  Always remember to fill this medication after leaving the hospital at your local pharmacy  It is also recommended that prior to going to sleep following your surgery, you should take this oral medication even if you have no pain  This may prevent you from awakening in significant pain should the block become less numb while you sleep  ?   ? If you have questions about your catheter and pain pump after you leave the hospital, they can be addressed in one of three ways:  ? 1  When the hospital and/or anesthesiologist calls you automatically the first day  after your surgery and questions may be answered then  ? 2  Call [facility]s 24 hour Product Support at [phone] (this will connect you to an on-call nurse who is specifically trained to deal with any problems the catheter or   pump may have)  ? 3  Call [facility] at [phone] and ask to be connected to the  on-call anesthesiologist   ?   ? Protect your numb arm from accidental injury, including from pressure, heat or cold  ?   ? Please follow these instructions until 24 hours after of the catheter is removed:  ? Keep your arm in a sling unless doing physical therapy  Keep all pressure off your elbow  ? YOU MAY NOT DRIVE    ?   ? Pump Operation:  ? If there is any leakage from the bandage covering the area where the catheter goes into your skin, simply apply a dry dressing over the one that is wet  Always leave the original dressing intact, replacing top dressing as needed  ? If you have any of the following symptoms: numb lips, ringing in your ears, metallic taste in your mouth, severe dizziness, blurred vision or slurred speech, clamp off the pain pump and have someone drive you to the nearest hospital ER or call 911  Some normal side effects of an arm catheter can be a horse voice, a sagging eyelid with eye redness, or mild shortness of breath  You do not need to come to the hospital if you have these side effects  ?   ? Removal of catheter and pump:  ? When the ball of medicine is empty and the numbness has worn off (approximately two or three days after your surgery) you MUST remove the catheter and discard the pump  Remove the dressing covering your catheter and slowly, but steadily, pull the catheter out of the skin  It should come out easily and painlessly  Once removed, cover the site with the Band-Aid, and throw the catheter and dressing out with the regular trash  DO NOT CUT THE CATHETER FOR ANY REASON

## 2018-02-08 NOTE — ANESTHESIA PREPROCEDURE EVALUATION
Review of Systems/Medical History  Patient summary reviewed  Chart reviewed      Cardiovascular   Pulmonary       GI/Hepatic            Endo/Other     GYN       Hematology   Musculoskeletal       Neurology   Psychology           Physical Exam    Airway    Mallampati score: II  TM Distance: >3 FB  Neck ROM: full     Dental       Cardiovascular  Rhythm: regular, Rate: normal,     Pulmonary  Breath sounds clear to auscultation,     Other Findings        Anesthesia Plan  ASA Score- 1     Anesthesia Type- IV sedation with anesthesia and regional with ASA Monitors  Additional Monitors:   Airway Plan:         Plan Factors-    Induction- intravenous  Postoperative Plan-     Informed Consent- Anesthetic plan and risks discussed with patient  I personally reviewed this patient with the CRNA  Discussed and agreed on the Anesthesia Plan with the CRNA  Anabelle Carrington

## 2018-02-08 NOTE — PERIOPERATIVE NURSING NOTE
Left sling in place, fingers warm, mobile sensate  On q cathter intact, pt  Denies any pain  PO fluids given, call bell in reach

## 2018-02-08 NOTE — OP NOTE
OPERATIVE REPORT  PATIENT NAME: Franny Tejeda    :  1970  MRN: [id number]  Pt Location: [facility] OR ROOM 02    SURGERY DATE: 2018    Surgeon(s) and Role:     * Maurizio Walton MD - Primary     * Zach Mendez PA-C - 1st assistant: Assisting necessary for the procedure for assistance with minimally invasive arthroscopic techniques for biceps tenodesis  Rc Pillai [facility] 2nd assistant    Preop Diagnosis:  Superior glenoid labrum lesion of left shoulder [S43 432A]    Post-Op Diagnosis Codes:     * Superior glenoid labrum lesion of left shoulder [S43 432A]    Procedure(s) (LRB):  SHOULDER ARTHROSCOPY WITH BICEPS TENODESIS (Left) using Arthrex biceps tenodesis screw    Specimen(s):  * No specimens in log *    Estimated Blood Loss:   Minimal    Drains:       Anesthesia Type:   IV Sedation with regional Anesthesia    Operative Indications:  Superior glenoid labrum lesion of left shoulder [S43 432A]  Christiano Chris is a 46-year-old male who has been suffering with left shoulder pain for 10 years  He was found to have a slap tear on MRI  He understood the risks and benefits of a left shoulder biceps arthroscopic tenodesis and wished to go ahead  The risks are inclusive of but not limited to infection, stiffness, nerve injury causing numbness pain and weakness, failure of the tenodesis, deformity of the biceps, failure to regain full strength and ability, worsening of symptoms, and need for further surgery  Operative Findings:  Left shoulder with a stable examination under anesthesia with forward flexion and abduction each to 160° with external rotation to 70° internal rotation is 60°  Intra-articular findings demonstrated good appearance of articular cartilage throughout the glenohumeral joint with no loose bodies in the axillary pouch  Subscapularis tendon was intact as was the supraspinatus tendon  Superior labrum had an obvious tear from anterior to posterior causing instability at the base of the biceps tendon  Biceps tendon was intact however with no obvious signs of tearing at the tendon but certainly there was instability at the tendon attachment on the superior labrum  A very nice biceps tenodesis was performed dunking the long head of biceps into the humeral head and fixing it nicely with the tenodesis screw  Complications:   None    Procedure and Technique:  Thong Gtz was taken to the operating room and placed supine on the OR table  He was given preoperative IV antibiotics and preoperative regional anesthesia by the attending anesthesiologist   He is taken comfortably and safely into the beach chair position with all parts well padded and the head in neutral position in the head wong  The left shoulder was taken through examination under anesthesia as described above  The left upper extremity was then prepped and draped in usual sterile fashion  A surgical time-out was taken  We then made the posterior portal with an 11 blade  Diagnostic arthroscopy begun and an anterior portal was made in the rotator interval with an 11 blade  We demonstrated good appearance of articular cartilage throughout the shoulder with no loose bodies in the axillary pouch  The supraspinatus and subscapularis tendon did appear to be intact  Long head of biceps tendon was intact however the insertion on the superior labrum had obvious tearing that was quite unstable  The superior labrum had the obvious type 2 slap tear  Thus, we began performance of our tenodesis  We did make a 3rd arthroscopic portal over the long head of the biceps tendon utilizing an 11 blade  We did tag the biceps tendon with use of a spinal needle and a FiberWire suture  We were then able to Clifford eyes the long head of biceps tendon at its interface with superior labrum utilizing the Wand device  We were then able to drill in the bicipital groove for the placement of the screw at a depth of 20 mm    We then took the tenodesis screw device and inserted that into the socket that had been created which dunked the long head of biceps tendon very nicely  Weighing gauge the screw which gave an excellent fit  We then tied additional knots over the screw utilizing the FiberWire from the tendon and the FiberWire from the screw itself  We were very pleased with our fixation as excess suture was removed  We took final arthroscopic images and removed the arthroscopic equipment  We closed the portals with 4-0 nylon suture  Dry, sterile dressings were applied with a sling  He tolerated the procedure well and transferred to recovery room stable condition  He will follow up with me in 1 week for suture removal   He will be in a sling for 3 weeks postoperatively     I was present for the entire procedure and A qualified resident physician was not available    Patient Disposition:  PACU     SIGNATURE: Maurizio Walton MD  DATE: February 8, 2018  TIME: 1:03 PM

## 2018-02-08 NOTE — ANESTHESIA PROCEDURE NOTES
Peripheral Block    Patient location during procedure: holding area  Start time: 2/8/2018 11:35 AM  Staffing  Anesthesiologist: Nadege Andrew  Performed: anesthesiologist   Preanesthetic Checklist  Completed: patient identified, site marked, surgical consent, pre-op evaluation, timeout performed, IV checked, risks and benefits discussed and monitors and equipment checked  Peripheral Block  Patient position: supine  Prep: ChloraPrep  Patient monitoring: heart rate, cardiac monitor, continuous pulse ox and frequent blood pressure checks  Block type: interscalene  Laterality: left  Injection technique: catheter  Procedures: ultrasound guided  ultrasound permanent image saved    Local infiltration: ropivacaine  Infiltration strength: 0 5 %  Dose: 30 mL  Needle  Needle type: Stimuplex   Needle localization: ultrasound guidance  Test dose: lidocaine 1 5% with epinephrine 1-to-200,000  Assessment  Injection assessment: negative aspiration for heme, negative aspiration for CSF, no paresthesia on injection, local visualized surrounding nerve on ultrasound and incremental injection  Paresthesia pain: none  Heart rate change: no  Post-procedure:  sterile dressing applied  patient tolerated the procedure well with no immediate complications  Additional Notes  Joel Lux present for timeout

## 2018-02-08 NOTE — PERIOPERATIVE NURSING NOTE
Patient tolerating fluids, IV d/ministerio  Left sling in place with ice as ordered  Full discharge instructions reviewed with on q instructions

## 2018-02-08 NOTE — ANESTHESIA POSTPROCEDURE EVALUATION
Post-Op Assessment Note      CV Status:  Stable    Mental Status:  Alert and awake    Hydration Status:  Stable    PONV Controlled:  Controlled    Airway Patency:  Patent and adequate    Post Op Vitals Reviewed: Yes          Staff: CRNA     Post-op block assessment: catheter intact and no complications        BP (P) 109/65 (02/08/18 1300)    Temp (!) (P) 97 °F (36 1 °C) (02/08/18 1300)    Pulse (P) 65 (02/08/18 1300)   Resp (P) 16 (02/08/18 1300)    SpO2 (P) 96 % (02/08/18 1300)

## 2018-02-09 NOTE — ANESTHESIA POST-OP FOLLOW-UP NOTE
Patient: Apryl Costa  Procedure(s):  SHOULDER ARTHROSCOPY WITH BICEPS TENODESIS  Vitals:    02/08/18 1340   BP: 128/62   Pulse: 71   Resp: 16   Temp:    SpO2: 99%             Phone call placed  Patient states to have very good pain control and is regaining use of hand  He had some residual voice hoarseness which has now subsided  He is having some triceps spasms but are painless  Pain score  Is 1/10  He denies any other neurological symptoms  He was educated on the use of the pump and is currently at 6 ml/hour

## 2018-02-16 ENCOUNTER — OFFICE VISIT (OUTPATIENT)
Dept: OBGYN CLINIC | Facility: CLINIC | Age: 48
End: 2018-02-16

## 2018-02-16 VITALS
HEART RATE: 71 BPM | DIASTOLIC BLOOD PRESSURE: 85 MMHG | HEIGHT: 71 IN | SYSTOLIC BLOOD PRESSURE: 124 MMHG | WEIGHT: 210.6 LBS | BODY MASS INDEX: 29.48 KG/M2

## 2018-02-16 DIAGNOSIS — S43.432D SUPERIOR GLENOID LABRUM LESION OF LEFT SHOULDER, SUBSEQUENT ENCOUNTER: Primary | ICD-10-CM

## 2018-02-16 PROCEDURE — 99024 POSTOP FOLLOW-UP VISIT: CPT | Performed by: ORTHOPAEDIC SURGERY

## 2018-02-16 NOTE — PROGRESS NOTES
H&P Exam - Orthopedics   Dorothy Barrios 41 y o  male MRN: [id number]  Unit/Bed#:  Encounter: [id number]    Assessment/Plan     Assessment:  Status post left shoulder biceps tenodesis  Plan:  Dianne Hurley is progressing as expected  He will remain in the sling for additional 2 weeks  He will begin with gentle range of motion in physical therapy and follow up with me in 6 weeks  I would like him to remain restricted in resistance and to not lift anything heavier than a coffee cup  Scribe Attestation    I,:   Stephanie Sheth MA am acting as a scribe while in the presence of the attending physician :        I,:   Sindy Mcfadden MD personally performed the services described in this documentation    as scribed in my presence :            History of Present Illness   HPI:  Dorothy Barrios is a 41 y o  male who presents with a follow-up visit for the left shoulder arthroscopic biceps tenodesis performed on February 8, 2018  He states he is doing well  He has excellent pain control  He has remained in his sling  He denies radiating pain or distal paresthesias  He will have the intermittent mild ache that is global about the left shoulder and feels better at rest in his sling  Review of Systems   Constitutional: Negative  HENT: Negative  Eyes: Negative  Respiratory: Negative  Cardiovascular: Negative  Gastrointestinal: Negative  Endocrine: Negative  Genitourinary: Negative  Musculoskeletal:        As noted in HPI   Skin: Negative  Allergic/Immunologic: Negative  Neurological: Negative  Hematological: Negative  Psychiatric/Behavioral: Negative          Historical Information   Past Medical History:   Diagnosis Date    Shoulder arthritis     left     Past Surgical History:   Procedure Laterality Date    ARTHROSCOPIC BICEPS TENODESIS Left 2/8/2018    Procedure: SHOULDER ARTHROSCOPY WITH BICEPS TENODESIS;  Surgeon: Sindy Mcfadden MD;  Location: [address] [place] [address];  Service: Orthopedics    ROTATOR CUFF REPAIR      ROTATOR CUFF REPAIR Left     x2 last one 2005    SEPTOPLASTY       Social History   History   Alcohol Use    Yes     Comment: socially     History   Drug Use No     History   Smoking Status    Former Smoker    Years: 15 00    Quit date: 2/5/2007   Smokeless Tobacco    Never Used     Family History: History reviewed  No pertinent family history  Meds/Allergies   all medications and allergies reviewed  No Known Allergies    Objective   Vitals: Blood pressure 124/85, pulse 71, height 5' 11" (1 803 m), weight 95 5 kg (210 lb 9 6 oz)  ,Body mass index is 29 37 kg/m²  [unfilled]    [unfilled]    Invasive Devices     Epidural Line            Nerve Block Catheter 02/08/18 8 days                Physical Exam   Constitutional: He is oriented to person, place, and time  He appears well-developed and well-nourished  HENT:   Head: Normocephalic and atraumatic  Eyes: Conjunctivae and EOM are normal    Neck: Normal range of motion  Cardiovascular: Normal rate  Pulmonary/Chest: Effort normal    Musculoskeletal:   As noted in HPI   Neurological: He is alert and oriented to person, place, and time  Skin: Skin is warm and dry  Psychiatric: He has a normal mood and affect  His behavior is normal  Judgment and thought content normal      Left Shoulder Exam     Comments: The left shoulder is benign in appearance  The surgical incisions are clean, dry and intact with no signs of infection  Range of motion and strength is as expected following surgery  He has normal sensations distally and a 2+ radial pulse              Lab Results:  None  Imaging: No images reviewed today  EKG, Pathology, and Other Studies: None    Code Status: [unfilled]  Advance Directive and Living Will:      Power of :    POLST:

## 2018-02-17 ENCOUNTER — TELEPHONE (OUTPATIENT)
Dept: FAMILY MEDICINE CLINIC | Facility: CLINIC | Age: 48
End: 2018-02-17

## 2018-02-17 DIAGNOSIS — G89.4 CHRONIC PAIN DISORDER: Primary | ICD-10-CM

## 2018-02-19 PROBLEM — E66.3 OVERWEIGHT (BMI 25.0-29.9): Status: ACTIVE | Noted: 2017-05-18

## 2018-02-19 PROBLEM — S43.432A SUPERIOR GLENOID LABRUM LESION OF LEFT SHOULDER: Status: ACTIVE | Noted: 2018-01-03

## 2018-02-19 RX ORDER — HYDROCODONE BITARTRATE AND ACETAMINOPHEN 5; 325 MG/1; MG/1
1 TABLET ORAL 2 TIMES DAILY PRN
Qty: 60 TABLET | Refills: 0 | Status: SHIPPED | OUTPATIENT
Start: 2018-02-19 | End: 2018-03-19 | Stop reason: SDUPTHER

## 2018-02-19 NOTE — TELEPHONE ENCOUNTER
Oxycodone-Acet filled on 2/8/2018 for 3 days by neva Noriega, your last filled hydrocodone was last filled by us on 1/16/18 for 30 days

## 2018-02-21 ENCOUNTER — TELEPHONE (OUTPATIENT)
Dept: OBGYN CLINIC | Facility: HOSPITAL | Age: 48
End: 2018-02-21

## 2018-02-21 NOTE — TELEPHONE ENCOUNTER
Caller: patient  Call back number: [phone]  Patient's doctor: Dr Rob Joe    Patient called stating the PT office called to set up an appointment  He is supposed to go 2/23  He states he was under the impression that he was to wait 3 weeks from his procedure to start PT   He is asking if he should start now or wait  Also, he states he has fluid in his elbow now  He wants to discuss this with someone   Please advise

## 2018-02-22 NOTE — TELEPHONE ENCOUNTER
This PT appt is the initial evaluation  I advised patient that he should keep that appt  What protocol shall PT be using? He is also complaining of fluid to the elbow  I advised ice 20 min on 20 min off, flex and extend the fingers  mx times per hour to help with fluid mobilization

## 2018-02-22 NOTE — TELEPHONE ENCOUNTER
The fluid at the elbow is common after a surgical procedure at the shoulder  This will improve over time  Once he is 3 weeks status post biceps tenodesis, he can begin active range of motion of the elbow and the shoulder in all planes  No strengthening however at the shoulder until 6 weeks postoperatively  He is not to begin physical therapy until 3 weeks postoperatively  There is not a formal biceps tenodesis protocol at this point  It is relatively straightforward    Thank you

## 2018-03-02 ENCOUNTER — EVALUATION (OUTPATIENT)
Dept: PHYSICAL THERAPY | Facility: CLINIC | Age: 48
End: 2018-03-02
Payer: COMMERCIAL

## 2018-03-02 DIAGNOSIS — S43.432D SUPERIOR GLENOID LABRUM LESION OF LEFT SHOULDER, SUBSEQUENT ENCOUNTER: Primary | ICD-10-CM

## 2018-03-02 PROCEDURE — 97161 PT EVAL LOW COMPLEX 20 MIN: CPT

## 2018-03-02 PROCEDURE — G8990 OTHER PT/OT CURRENT STATUS: HCPCS

## 2018-03-02 PROCEDURE — G8991 OTHER PT/OT GOAL STATUS: HCPCS

## 2018-03-02 NOTE — PROGRESS NOTES
PT Evaluation     Today's date: 3/2/2018  Patient name: Mohit Montenegro  : 1970  MRN: [id number]  Referring provider: Cristin Gomez MD  Dx:   Encounter Diagnosis     ICD-10-CM    1  Superior glenoid labrum lesion of left shoulder, subsequent encounter S43 432D Ambulatory referral to Physical Therapy                  Assessment  Impairments: abnormal or restricted ROM, activity intolerance, impaired physical strength and pain with function  Functional limitations: unable to participate in recreational activities, including lifting weights and boxing, difficulty with dressing and ADLs  Assessment details: Mohit Montenegro is a 41 y o  male presenting to PT s/p L biceps tenodesis with reports of tightness, decreased shoulder range of motion, decreased strength, and decreased tolerance to activity  Patient would benefit from skilled PT services to address these impairments and to maximize function in order to improve quality of life  Thank you for the referral   Understanding of Dx/Px/POC: good   Prognosis: good    Goals  STG  1) L shoulder ROM will improve 50% in 4 weeks  2) L shoulder strength will improve 1/2 grade in 4 weeks  3) Patient is independent with ADLs and dressing in 4 weeks  LTG  1) Patient is independent in HEP within 8 weeks  2) Patient can lift 5 pounds above shoulder height onto a shelf with 0/10 pain within 8 weeks  3) Patient has full, WNL L shoulder ROM in 8 weeks  4) Patient will participate in at least 50% of prior recreational activity within 8 weeks      Plan  Patient would benefit from: skilled PT  Planned modality interventions: cryotherapy, TENS and unattended electrical stimulation  Planned therapy interventions: home exercise program, therapeutic exercise, therapeutic activities, stretching, strengthening, postural training, patient education, neuromuscular re-education, manual therapy and joint mobilization  Frequency: 2x week  Duration in weeks: 8  Treatment plan discussed with: patient        Subjective Evaluation    History of Present Illness  Date of surgery: 2018  Mechanism of injury: Patient presents s/p L biceps tenodesis 3 weeks ago  He was in a sling for three weeks and was d/c from it yesterday  Quality of life: good    Pain  Current pain ratin  At best pain ratin  At worst pain rating: 3    Hand dominance: left    Patient Goals  Patient goals for therapy: increased motion, increased strength, return to sport/leisure activities and independence with ADLs/IADLs          Objective     Postural Observations  Seated posture: good  Standing posture: good        Observations   Left Shoulder   Positive for incision  Additional Observation Details  Incisions healing well, dry and intact  Tenderness     Additional Tenderness Details  Pt denies TTP around all incisions and throughout L shoulder joint  Cervical/Thoracic Screen   Cervical range of motion within normal limits  Thoracic range of motion within normal limits    Active Range of Motion   Left Shoulder   Flexion: 104 degrees   Abduction: 80 degrees   External rotation BTH: C2   Internal rotation BTB: L3     Passive Range of Motion   Left Shoulder   Flexion: 135 degrees     Strength/Myotome Testing     Additional Strength Details  Strength not tested  No resistance until 6 weeks post-op          Precautions: no strengthening until 6 weeks post-op    Daily Treatment Diary     Manual              L shoulder PROM             GHJ AP/Inf mobs prn                                                        Exercise Diary              Pulleys             Finger ladder             Pendulums             Wall slides             Table slides scaption             ER table stretch             AAROM wand flexion             AAROM wand abduction/ER             Biceps curls             Skull crushers             Standing rows             Shoulder extensions             Scapular retractions with B/L shoulder ER Modalities              CP prn

## 2018-03-06 ENCOUNTER — OFFICE VISIT (OUTPATIENT)
Dept: PHYSICAL THERAPY | Facility: CLINIC | Age: 48
End: 2018-03-06
Payer: COMMERCIAL

## 2018-03-06 DIAGNOSIS — S43.432D SUPERIOR GLENOID LABRUM LESION OF LEFT SHOULDER, SUBSEQUENT ENCOUNTER: Primary | ICD-10-CM

## 2018-03-06 PROCEDURE — 97110 THERAPEUTIC EXERCISES: CPT

## 2018-03-06 PROCEDURE — 97140 MANUAL THERAPY 1/> REGIONS: CPT

## 2018-03-06 NOTE — PROGRESS NOTES
Daily Note     Today's date: 3/6/2018  Patient name: Tab Salcedo  : 1970  MRN: [id number]  Referring provider: Dayron Arora MD  Dx:   Encounter Diagnosis     ICD-10-CM    1  Superior glenoid labrum lesion of left shoulder, subsequent encounter S43 432D                   Subjective: Patient states he has had no trouble completing home stretches since IE and only has slight discomfort described as a strong stretch at end range of motion in all planes  Objective: See treatment diary below  Added ROM stretches today  Assessment: Tolerated treatment well  Patient demonstrated fatigue post treatment, exhibited good technique with therapeutic exercises and would benefit from continued PT      Plan: Continue per plan of care  Progress treatment as tolerated          Precautions: no strengthening until 6 weeks post-op     Daily Treatment Diary      Manual   3/6                     L shoulder PROM  AN                     GHJ AP/Inf mobs prn  AN                        15'                                                                           Exercise Diary   3/6                     Pulleys 6 min                     Finger ladder 10"x10                     Pendulums 30 ea                     Wall slides NP                     Table slides scaption 10"x10                     ER table stretch 10"x10                     AAROM wand flexion 10"x10                     AAROM wand abduction/ER 10"x10                     Biceps curls 30x                     Skull crushers NP                     Standing rows NP                     Shoulder extensions NP                     Scapular retractions with B/L shoulder ER NP                                                                                                                                                                                                  Modalities                        CP prn

## 2018-03-07 ENCOUNTER — APPOINTMENT (OUTPATIENT)
Dept: PHYSICAL THERAPY | Facility: CLINIC | Age: 48
End: 2018-03-07
Payer: COMMERCIAL

## 2018-03-08 ENCOUNTER — TELEPHONE (OUTPATIENT)
Dept: OBGYN CLINIC | Facility: CLINIC | Age: 48
End: 2018-03-08

## 2018-03-08 ENCOUNTER — APPOINTMENT (OUTPATIENT)
Dept: PHYSICAL THERAPY | Facility: CLINIC | Age: 48
End: 2018-03-08
Payer: COMMERCIAL

## 2018-03-08 ENCOUNTER — OFFICE VISIT (OUTPATIENT)
Dept: PHYSICAL THERAPY | Facility: CLINIC | Age: 48
End: 2018-03-08
Payer: COMMERCIAL

## 2018-03-08 DIAGNOSIS — S43.432D SUPERIOR GLENOID LABRUM LESION OF LEFT SHOULDER, SUBSEQUENT ENCOUNTER: Primary | ICD-10-CM

## 2018-03-08 NOTE — PROGRESS NOTES
Patient presents to clinic today after increased pain in shoulder over the past 24 hours  He reports he used L arm to lift windshield wiper out of heavy snow yesterday and immediately felt a pain and weakness in his L shoulder  He reports the anterior shoulder is sensitive/sore with pressure  He reports difficulty holding his jacket today, he gets increased pain when attempting AROM flexion or abduction, and reports he subconsciously attempted to reach behind his back this morning but immediately felt increased pain  I advised patient to hold on therapy until he visits with surgeon Monday morning, 3/12  Advised patient to apply ice to the joint intermittently throughout the day, and keep his sling with him during the day as a reminder to prevent aggravating movements  Plan to communicate with patient after his visit to surgeon on 3/12

## 2018-03-08 NOTE — TELEPHONE ENCOUNTER
He was only supposed to be doing range of motion exercises, no lifting  The concern is that he may have compromised the repair of his biceps tendon  He may continue with ROM exercises, but should come in to see Dr Myah Arce next week for a clinical re-evaluation  Conservative treatment in the meantime and absolutely no lifting  Please call him to set up a follow up, thank you!

## 2018-03-08 NOTE — TELEPHONE ENCOUNTER
Patient had shoulder surgery about 4 weeks ago with you  Thinks he might of re-injured it, he went to lift the wiper blade on the car, heavy wet snow on it, he had  Significant amount of pain since  Pain has not gone away, ROM is limited again  Wondering what he should do  Does not have an appointment with you till the end of the month

## 2018-03-09 ENCOUNTER — APPOINTMENT (OUTPATIENT)
Dept: PHYSICAL THERAPY | Facility: CLINIC | Age: 48
End: 2018-03-09
Payer: COMMERCIAL

## 2018-03-14 ENCOUNTER — OFFICE VISIT (OUTPATIENT)
Dept: PHYSICAL THERAPY | Facility: CLINIC | Age: 48
End: 2018-03-14
Payer: COMMERCIAL

## 2018-03-14 DIAGNOSIS — S43.432D SUPERIOR GLENOID LABRUM LESION OF LEFT SHOULDER, SUBSEQUENT ENCOUNTER: Primary | ICD-10-CM

## 2018-03-14 PROCEDURE — 97140 MANUAL THERAPY 1/> REGIONS: CPT

## 2018-03-14 PROCEDURE — 97110 THERAPEUTIC EXERCISES: CPT

## 2018-03-14 NOTE — PROGRESS NOTES
Daily Note     Today's date: 3/14/2018  Patient name: Yamel Calhoun  : 1970  MRN: [id number]  Referring provider: Tonja Hummel MD  Dx:   Encounter Diagnosis     ICD-10-CM    1  Superior glenoid labrum lesion of left shoulder, subsequent encounter S43 432D               Subjective: Patient presents today with improved AROM and less pain than last week after using his LUE to  his windshield wiper  He was advised to avoid lifting anything until 6 weeks post-op until repair is healed  He states that over the weekend his pain was greatly improved and he has gained back his ROM  Objective: See treatment diary below  Assessment: Tolerated treatment well  Patient demonstrated fatigue post treatment, exhibited good technique with therapeutic exercises and would benefit from continued PT      Plan: Continue per plan of care  Plan to progress AROM next visit        Precautions: no strengthening until 6 weeks post-op     Daily Treatment Diary      Manual   3/6  3/14                   L shoulder PROM  AN  AN                   GHJ AP/Inf mobs prn  AN  AN                      15'  15'                                                                         Exercise Diary   3/6  3/14                   Pulleys 6 min 6 min                   Finger ladder 10"x10 10"x10                   Pendulums 30 ea 30 ea                   Wall slides NP NP                   Table slides scaption 10"x10 10"x10                   ER table stretch 10"x10 10"x10                   AAROM wand flexion 10"x10 10"x10                   AAROM wand abduction/ER 10"x10 10"x10                   Biceps curls 30x 30x                   Skull crushers NP 2x10                   Standing rows NP NV                   Shoulder extensions NP NP                   Scapular retractions with B/L shoulder ER NP 3"x30                   Standing shoulder flexion  NV                                                                                                                                                                         Modalities     3/14                   CP prn  10' post

## 2018-03-16 ENCOUNTER — OFFICE VISIT (OUTPATIENT)
Dept: PHYSICAL THERAPY | Facility: CLINIC | Age: 48
End: 2018-03-16
Payer: COMMERCIAL

## 2018-03-16 DIAGNOSIS — S43.432D SUPERIOR GLENOID LABRUM LESION OF LEFT SHOULDER, SUBSEQUENT ENCOUNTER: Primary | ICD-10-CM

## 2018-03-16 PROCEDURE — 97110 THERAPEUTIC EXERCISES: CPT

## 2018-03-16 PROCEDURE — 97140 MANUAL THERAPY 1/> REGIONS: CPT

## 2018-03-16 NOTE — PROGRESS NOTES
Daily Note     Today's date: 3/16/2018  Patient name: Aida Alcantar  : 1970  MRN: [id number]  Referring provider: Denise Cabrera MD  Dx:   Encounter Diagnosis     ICD-10-CM    1  Superior glenoid labrum lesion of left shoulder, subsequent encounter S43 470D               Subjective: Patient reports his shoulder is feeling much better than last week  He notes his AROM has improved to almost symmetrical to RUE  Objective: See treatment diary below  Assessment: Tolerated treatment well  Patient shows major improvement in L shoulder AROM with no pain noted throughout  He does have some anterior shoulder discomfort with horizontal adduction at end ROM  Patient exhibited good technique with therapeutic exercises and would benefit from continued PT      Plan: Continue per plan of care  Progress treament per protocol         Precautions: no strengthening until 6 weeks post-op     Daily Treatment Diary      Manual   3/6  3/14  3/16                 L shoulder PROM  AN  AN  AN                 GHJ AP/Inf mobs prn Maxcine Broaden                    15'  15'  15'                                                                       Exercise Diary   3/6  3/14  3/16                 Pulleys 6 min 6 min 6 min                 Finger ladder 10"x10 10"x10 10"x10                 Pendulums 30 ea 30 ea 30 ea                 Wall slides NP NP NP                 Table slides scaption 10"x10 10"x10 10"x10                 ER table stretch 10"x10 10"x10 10"x10                 AAROM wand flexion 10"x10 10"x10 D/C                 AAROM wand abduction/ER 10"x10 10"x10 10"x10                 Biceps curls 30x 30x 40x                 Skull crushers NP 2x10 3x10                 Standing rows NP NV 3x10                 Shoulder extensions NP NP NP                 Scapular retractions with B/L shoulder ER NP 3"x30 3"x30                 Standing shoulder flexion  NV 10"x10                                                                                                                                                                     Modalities     3/14  3/16                 CP prn  10' post NP

## 2018-03-19 DIAGNOSIS — G89.4 CHRONIC PAIN DISORDER: ICD-10-CM

## 2018-03-20 RX ORDER — HYDROCODONE BITARTRATE AND ACETAMINOPHEN 5; 325 MG/1; MG/1
1 TABLET ORAL 2 TIMES DAILY PRN
Qty: 60 TABLET | Refills: 0 | Status: SHIPPED | OUTPATIENT
Start: 2018-03-20 | End: 2018-04-19 | Stop reason: SDUPTHER

## 2018-03-21 ENCOUNTER — OFFICE VISIT (OUTPATIENT)
Dept: PHYSICAL THERAPY | Facility: CLINIC | Age: 48
End: 2018-03-21
Payer: COMMERCIAL

## 2018-03-21 DIAGNOSIS — S43.432D SUPERIOR GLENOID LABRUM LESION OF LEFT SHOULDER, SUBSEQUENT ENCOUNTER: Primary | ICD-10-CM

## 2018-03-21 PROCEDURE — 97140 MANUAL THERAPY 1/> REGIONS: CPT

## 2018-03-21 PROCEDURE — 97110 THERAPEUTIC EXERCISES: CPT

## 2018-03-21 NOTE — PROGRESS NOTES
Daily Note     Today's date: 3/21/2018  Patient name: Jaspal Kenney  : 1970  MRN: [id number]  Referring provider: Don Austin MD  Dx:   Encounter Diagnosis     ICD-10-CM    1  Superior glenoid labrum lesion of left shoulder, subsequent encounter S43 432D               Subjective: Patient presents with no pain today, noting he will occasionally have times of a dull ache that lasts a few minutes however this does not limit his movement or function  Objective: See treatment diary below  Assessment: Tolerated treatment well  Able to complete all exercises today with improvements in L shoulder AROM in all planes  No pain or discomfort felt at end range during PROM/stretching  Patient exhibited good technique with therapeutic exercises and would benefit from continued PT      Plan: Continue per plan of care  Progress treament per protocol  Plan to add light strengthening next visit        Precautions: no strengthening until 6 weeks post-op     Daily Treatment Diary      Manual   3/6  3/14  3/16  3/21               L shoulder PROM  AN  AN  AN  AN               GHJ AP/Inf mobs donnien Syd Kelley                  15'  15'  15'  15'                                                                     Exercise Diary   3/6  3/14  3/16  3/21               Pulleys 6 min 6 min 6 min 6 min               Finger ladder 10"x10 10"x10 10"x10 10"x10               Pendulums 30 ea 30 ea 30 ea D/C               Wall slides NP NP NP 10"x10               Table slides scaption 10"x10 10"x10 10"x10 D/C               ER table stretch 10"x10 10"x10 10"x10 10"x10               AAROM wand flexion 10"x10 10"x10 D/C ----               AAROM wand abduction/ER 10"x10 10"x10 10"x10 10"x10               Biceps curls 30x 30x 40x 40x               Skull crushers NP 2x10 3x10 3x10               Prone Y/T NP NP NP NV               Prone rows/extensions NP NP NP 2x10               Scapular retractions with B/L shoulder ER NP 3"x30 3"x30 3"x30               Standing shoulder flexion  NV 10"x10 20x               4 way isometric       NV               Wall ball circles       NV                                                                                                                    Modalities     3/14  3/16  3/21               CP prn  10' post NP NP

## 2018-03-23 ENCOUNTER — OFFICE VISIT (OUTPATIENT)
Dept: PHYSICAL THERAPY | Facility: CLINIC | Age: 48
End: 2018-03-23
Payer: COMMERCIAL

## 2018-03-23 DIAGNOSIS — S43.432D SUPERIOR GLENOID LABRUM LESION OF LEFT SHOULDER, SUBSEQUENT ENCOUNTER: Primary | ICD-10-CM

## 2018-03-23 PROCEDURE — 97112 NEUROMUSCULAR REEDUCATION: CPT

## 2018-03-23 PROCEDURE — G8990 OTHER PT/OT CURRENT STATUS: HCPCS

## 2018-03-23 PROCEDURE — 97140 MANUAL THERAPY 1/> REGIONS: CPT

## 2018-03-23 PROCEDURE — G8991 OTHER PT/OT GOAL STATUS: HCPCS

## 2018-03-23 PROCEDURE — 97110 THERAPEUTIC EXERCISES: CPT

## 2018-03-23 NOTE — PROGRESS NOTES
Daily Note     Today's date: 3/23/2018  Patient name: Dorothy Bariros  : 1970  MRN: [id number]  Referring provider: Pallavi Blevins MD  Dx:   Encounter Diagnosis     ICD-10-CM    1  Superior glenoid labrum lesion of left shoulder, subsequent encounter S43 432D               Subjective: Patient denies any pain or discomfort in L shoulder upon arrival today  Objective: See treatment diary below  FOTO score improved from 57 to 81 in 6 visits  Assessment: Tolerated treatment well  Good performance of all exercises today and able to tolerate new strengthening activities with no discomfort, only reporting muscle fatigue sensation  Patient demonstrated fatigue post treatment, exhibited good technique with therapeutic exercises and would benefit from continued PT      Plan: Continue per plan of care  Progress treatment as tolerated          Precautions: no strengthening until 6 weeks post-op     Daily Treatment Diary      Manual   3/6  3/14  3/16  3/21  3/23             L shoulder PROM  AN  AN  AN  AN  AN             GHJ AP/Inf mobs prn Patty Bashir                15'  15'  15'  15'  15'                                                                   Exercise Diary   3/6  3/14  3/16  3/21  3/23             Pulleys 6 min 6 min 6 min 6 min 6 min             Finger ladder 10"x10 10"x10 10"x10 10"x10 10"x10             Pendulums 30 ea 30 ea 30 ea D/C ----             Wall slides NP NP NP 10"x10 D/C             Table slides scaption 10"x10 10"x10 10"x10 D/C ----             ER table stretch 10"x10 10"x10 10"x10 10"x10 10"x10             AAROM wand flexion 10"x10 10"x10 D/C ---- ----             AAROM wand abduction/ER 10"x10 10"x10 10"x10 10"x10 10"x10             Biceps curls 30x 30x 40x 40x 1# x30             Skull crushers NP 2x10 3x10 3x10 3x10             Prone Y/T NP NP NP NV NV             Prone rows/extensions NP NP NP 2x10 2x10             Scapular retractions with B/L shoulder ER NP 3"x30 3"x30 3"x30 3"x30             Standing shoulder flexion  NV 10"x10 20x 2x10             4 way isometric       NV 10"x10             Wall ball circles       NV NV                                                                                                                  Modalities     3/14  3/16  3/21  3/23             CP prn  10' post NP NP NP

## 2018-03-26 ENCOUNTER — OFFICE VISIT (OUTPATIENT)
Dept: PHYSICAL THERAPY | Facility: CLINIC | Age: 48
End: 2018-03-26
Payer: COMMERCIAL

## 2018-03-26 DIAGNOSIS — S43.432D SUPERIOR GLENOID LABRUM LESION OF LEFT SHOULDER, SUBSEQUENT ENCOUNTER: Primary | ICD-10-CM

## 2018-03-26 PROCEDURE — 97112 NEUROMUSCULAR REEDUCATION: CPT

## 2018-03-26 PROCEDURE — 97110 THERAPEUTIC EXERCISES: CPT

## 2018-03-26 NOTE — PROGRESS NOTES
Daily Note     Today's date: 3/26/2018  Patient name: Gabrielle Lafleur  : 1970  MRN: [id number]  Referring provider: Lissette Arnold MD  Dx:   Encounter Diagnosis     ICD-10-CM    1  Superior glenoid labrum lesion of left shoulder, subsequent encounter S43 432D               Subjective: Patient reports he has had no pain or discomfort in L shoulder since previous session  Objective: See treatment diary below  Assessment: Tolerated treatment well  Good performance of increased volume of strengthening exercises  Patient demonstrated fatigue post treatment, exhibited good technique with therapeutic exercises and would benefit from continued PT      Plan: Continue per plan of care  Progress treatment as tolerated          Precautions: no strengthening until 6 weeks post-op     Daily Treatment Diary      Manual   3/6  3/14  3/16  3/21  3/23  3/26           L shoulder PROM  AN  AN  AN  AN  AN NP           GHJ AP/Inf mobs prn  AN Merline Elian NP              15'  15'  15'  15'  15'                                                                   Exercise Diary   3/6  3/14  3/16  3/21  3/23  3/26           Pulleys 6 min 6 min 6 min 6 min 6 min 6 min           Finger ladder 10"x10 10"x10 10"x10 10"x10 10"x10 D/C           Pendulums 30 ea 30 ea 30 ea D/C ---- ----           Wall slides NP NP NP 10"x10 D/C ----           Table slides scaption 10"x10 10"x10 10"x10 D/C ---- ----           ER table stretch 10"x10 10"x10 10"x10 10"x10 10"x10 10"x10           AAROM wand flexion 10"x10 10"x10 D/C ---- ---- ----           AAROM wand abduction/ER 10"x10 10"x10 10"x10 10"x10 10"x10 10"x10           Biceps curls 30x 30x 40x 40x 1# x30 2# x30           Skull crushers NP 2x10 3x10 3x10 3x10 1# x30           Prone Y/T NP NP NP NV NV x10 ea           Prone rows/extensions NP NP NP 2x10 2x10 2x10           Scapular retractions with B/L shoulder ER NP 3"x30 3"x30 3"x30 3"x30 Pink 3"x30           Standing shoulder flexion NV 10"x10 20x 2x10 1# x20           4 way isometric       NV 10"x10 10"x10           Wall ball circles       NV NV 30 ea                                                                                                                Modalities     3/14  3/16  3/21  3/23  3/26           CP prn  10' post NP NP NP 10' post

## 2018-03-28 ENCOUNTER — APPOINTMENT (OUTPATIENT)
Dept: PHYSICAL THERAPY | Facility: CLINIC | Age: 48
End: 2018-03-28
Payer: COMMERCIAL

## 2018-03-29 ENCOUNTER — OFFICE VISIT (OUTPATIENT)
Dept: PHYSICAL THERAPY | Facility: CLINIC | Age: 48
End: 2018-03-29
Payer: COMMERCIAL

## 2018-03-29 DIAGNOSIS — S43.432D SUPERIOR GLENOID LABRUM LESION OF LEFT SHOULDER, SUBSEQUENT ENCOUNTER: Primary | ICD-10-CM

## 2018-03-29 PROCEDURE — 97150 GROUP THERAPEUTIC PROCEDURES: CPT

## 2018-03-29 PROCEDURE — 97140 MANUAL THERAPY 1/> REGIONS: CPT

## 2018-03-29 PROCEDURE — 97112 NEUROMUSCULAR REEDUCATION: CPT

## 2018-03-29 PROCEDURE — 97110 THERAPEUTIC EXERCISES: CPT

## 2018-03-29 NOTE — PROGRESS NOTES
Daily Note     Today's date: 3/29/2018  Patient name: Apryl Costa  : 1970  MRN: [id number]  Referring provider: Jermaine Zapata MD  Dx:   Encounter Diagnosis     ICD-10-CM    1  Superior glenoid labrum lesion of left shoulder, subsequent encounter S43 432D               Subjective: Patient reports no pain in L shoulder today, stating the intensity and frequency of his soreness has been gradually decreasing  Objective: See treatment diary below  Assessment: Tolerated treatment well  Patient demonstrated fatigue post treatment, exhibited good technique with therapeutic exercises and would benefit from continued PT      Plan: Continue per plan of care  Progress treatment as tolerated          Precautions: no strengthening until 6 weeks post-op     Daily Treatment Diary      Manual   3/6  3/14  3/16  3/21  3/23  3/26  3/29         L shoulder PROM  AN  AN  AN  AN  AN NP AN         GHJ AP/Inf mobs prn  AN Evie Vogt NP AN            15'  15'  15'  15'  15'  10'                                                               Exercise Diary   3/6  3/14  3/16  3/21  3/23  3/26  3/29         Pulleys 6 min 6 min 6 min 6 min 6 min 6 min 5 min D/C       Finger ladder 10"x10 10"x10 10"x10 10"x10 10"x10 D/C ----         Pendulums 30 ea 30 ea 30 ea D/C ---- ---- ----         Wall slides NP NP NP 10"x10 D/C ---- ----         Table slides scaption 10"x10 10"x10 10"x10 D/C ---- ---- ----         ER table stretch 10"x10 10"x10 10"x10 10"x10 10"x10 10"x10 D/C         AAROM wand flexion 10"x10 10"x10 D/C ---- ---- ---- ----         AAROM wand abduction/ER 10"x10 10"x10 10"x10 10"x10 10"x10 10"x10 10"x10         Biceps curls 30x 30x 40x 40x 1# x30 2# x30 2# x30         Skull crushers NP 2x10 3x10 3x10 3x10 1# x30 1# x30         Prone Y/T NP NP NP NV NV x10 ea 2x10         Prone rows/extensions NP NP NP 2x10 2x10 2x10 2# 2x10         Scapular retractions with B/L shoulder ER NP 3"x30 3"x30 3"x30 3"x30 Pink 3"x30 Pink 3"x30         Standing shoulder flexion  NV 10"x10 20x 2x10 1# x20 1# x20         4 way isometric       NV 10"x10 10"x10 10"x10         Wall ball circles       NV NV 30 ea 2x20 ea         UBE            NV         Sleeper stretch             10"x10         TB rows/ext             Green 2x10                                       Modalities     3/14  3/16  3/21  3/23  3/26  3/29         CP prn  10' post NP NP NP 10' post 10' post

## 2018-03-30 ENCOUNTER — OFFICE VISIT (OUTPATIENT)
Dept: OBGYN CLINIC | Facility: CLINIC | Age: 48
End: 2018-03-30

## 2018-03-30 VITALS
HEIGHT: 71 IN | DIASTOLIC BLOOD PRESSURE: 77 MMHG | SYSTOLIC BLOOD PRESSURE: 126 MMHG | BODY MASS INDEX: 29.12 KG/M2 | WEIGHT: 208 LBS | HEART RATE: 77 BPM

## 2018-03-30 DIAGNOSIS — S43.432D SUPERIOR GLENOID LABRUM LESION OF LEFT SHOULDER, SUBSEQUENT ENCOUNTER: Primary | ICD-10-CM

## 2018-03-30 PROCEDURE — 99024 POSTOP FOLLOW-UP VISIT: CPT | Performed by: ORTHOPAEDIC SURGERY

## 2018-03-30 NOTE — PROGRESS NOTES
Assessment/Plan:  Assessment/Plan   1  Superior glenoid labrum lesion of left shoulder, subsequent encounter  Ambulatory referral to Physical Therapy      Scribe Attestation    I,:   Yosvany Bates am acting as a scribe while in the presence of the attending physician :        I,:   Eric Ram MD personally performed the services described in this documentation    as scribed in my presence :              Plan  Upon physical examination today, I am very pleased with Mike's progress to date  At this time, I am recommending he continue with physical therapy, or a home exercise program, to continue restoring his strength and function  I did discuss with him to remain cautious and conservative with progressing with weights for another 6 weeks  I will follow-up with him in 6 weeks for repeat examination, or if his symptoms allow he does not need to see me  Althea Singh understands and is in agreement with this treatment plan  Subjective:   Patient ID: Artem Bain is a 41 y o  male  Althea Riddlelizett is a 82-year-old left-hand dominant male here today for postoperative follow-up following a left shoulder arthroscopic biceps tenodesis on 2/8/18  Today, he reports he is doing very well and denies any pain, numbness, tingling, or radiating symptoms  He reports he has been undergoing physical therapy as prescribed per protocol, and that this is going very well  He reports he has all of his motion, and it is hopeful to begin progressing with strengthening exercises  The following portions of the patient's history were reviewed and updated as appropriate:   He  has a past medical history of Shoulder arthritis    He   Patient Active Problem List    Diagnosis Date Noted    Superior glenoid labrum lesion of left shoulder 01/03/2018    Overweight (BMI 25 0-29 9) 05/18/2017    Hair loss 12/19/2016    Cold sore 02/03/2016    Mixed hyperlipidemia 10/27/2015    Impaired fasting glucose 08/27/2014    Pain in joint of left shoulder 08/15/2014     He  has a past surgical history that includes Septoplasty; Rotator cuff repair; Rotator cuff repair (Left); and ARTHROSCOPIC BICEPS TENODESIS (Left, 2/8/2018)  His family history is not on file  He  reports that he quit smoking about 11 years ago  He quit after 15 00 years of use  He has never used smokeless tobacco  He reports that he drinks alcohol  He reports that he does not use drugs  Current Outpatient Prescriptions   Medication Sig Dispense Refill    Cholecalciferol (VITAMIN D) 2000 units CAPS Take by mouth      finasteride (PROPECIA) 1 MG tablet Take 1 mg by mouth daily      glucosamine-chondroitin 500-400 MG tablet Take 1 tablet by mouth every morning      HYDROcodone-acetaminophen (NORCO) 5-325 mg per tablet Take 1 tablet by mouth 2 (two) times a day as needed for pain for up to 30 days Earliest Fill Date: 3/20/18 Max Daily Amount: 2 tablets 60 tablet 0    Multiple Vitamin (MULTIVITAMIN) tablet Take 1 tablet by mouth daily      Omega-3 Fatty Acids (FISH OIL) 1,000 mg Take 1,000 mg by mouth daily       No current facility-administered medications for this visit  Current Outpatient Prescriptions on File Prior to Visit   Medication Sig    Cholecalciferol (VITAMIN D) 2000 units CAPS Take by mouth    finasteride (PROPECIA) 1 MG tablet Take 1 mg by mouth daily    glucosamine-chondroitin 500-400 MG tablet Take 1 tablet by mouth every morning    HYDROcodone-acetaminophen (NORCO) 5-325 mg per tablet Take 1 tablet by mouth 2 (two) times a day as needed for pain for up to 30 days Earliest Fill Date: 3/20/18 Max Daily Amount: 2 tablets    Multiple Vitamin (MULTIVITAMIN) tablet Take 1 tablet by mouth daily    Omega-3 Fatty Acids (FISH OIL) 1,000 mg Take 1,000 mg by mouth daily     No current facility-administered medications on file prior to visit  He has No Known Allergies       Review of Systems   Constitutional: Negative for chills, fever and unexpected weight change  HENT: Negative for hearing loss, nosebleeds and sore throat  Eyes: Negative for pain, redness and visual disturbance  Respiratory: Negative for cough, shortness of breath and wheezing  Cardiovascular: Negative for chest pain, palpitations and leg swelling  Gastrointestinal: Negative for abdominal pain, nausea and vomiting  Endocrine: Negative for polydipsia and polyuria  Genitourinary: Negative for dysuria and hematuria  Musculoskeletal: Negative for arthralgias, joint swelling and myalgias  Skin: Negative for rash and wound  Neurological: Negative for dizziness, numbness and headaches  Psychiatric/Behavioral: Negative for decreased concentration and suicidal ideas  The patient is not nervous/anxious  (-) Depression       Objective:  Left Shoulder Exam     Tenderness   The patient is experiencing no tenderness  Range of Motion   The patient has normal left shoulder ROM  Active Abduction: normal   Forward Flexion: normal   External Rotation: normal   Internal Rotation 0 degrees:  T12 normal   Internal Rotation 90 degrees: normal     Muscle Strength   The patient has normal left shoulder strength  Other   Sensation: normal  Pulse: present     Comments:  Special tests deferred due to postoperative status            Physical Exam   Constitutional: He is oriented to person, place, and time  He appears well-developed and well-nourished  HENT:   Head: Normocephalic  Nose: Nose normal    Eyes: Conjunctivae and EOM are normal    Neck: Normal range of motion  Cardiovascular: Normal rate and intact distal pulses  Pulmonary/Chest: Effort normal  No respiratory distress  Abdominal: Soft  He exhibits no distension  Musculoskeletal: Normal range of motion  Left shoulder: He exhibits decreased strength  He exhibits normal range of motion, no tenderness, no pain and normal pulse  Neurological: He is alert and oriented to person, place, and time     Skin: Skin is warm and dry  Psychiatric: He has a normal mood and affect   His behavior is normal  Judgment and thought content normal

## 2018-04-04 ENCOUNTER — OFFICE VISIT (OUTPATIENT)
Dept: PHYSICAL THERAPY | Facility: CLINIC | Age: 48
End: 2018-04-04
Payer: COMMERCIAL

## 2018-04-04 DIAGNOSIS — S43.432D SUPERIOR GLENOID LABRUM LESION OF LEFT SHOULDER, SUBSEQUENT ENCOUNTER: Primary | ICD-10-CM

## 2018-04-04 PROCEDURE — 97110 THERAPEUTIC EXERCISES: CPT

## 2018-04-04 PROCEDURE — 97140 MANUAL THERAPY 1/> REGIONS: CPT

## 2018-04-04 PROCEDURE — 97112 NEUROMUSCULAR REEDUCATION: CPT

## 2018-04-04 NOTE — PROGRESS NOTES
Daily Note     Today's date: 2018  Patient name: Kristi White  : 1970  MRN: [id number]  Referring provider: Randy Cross MD  Dx:   Encounter Diagnosis     ICD-10-CM    1  Superior glenoid labrum lesion of left shoulder, subsequent encounter S43 432D               Subjective: Patient is currently 7 weeks, 6 days post-op  He reported L shoulder stiffness upon arrival to treatment  Minimal increase in symptoms following previous treatment  Objective: See treatment diary below      Assessment: Progressing steadily with shoulder strength and scapular stabilization, demonstrating fatigue through program  Manual continues to be utilized to maintain gained ROM of L shoulder  Plan: Continue per plan of care  Progress treatment as tolerated  Discharge pulleys NV and progress to UBE        Precautions: no strengthening until 6 weeks post-op     Daily Treatment Diary      Manual   3/6  3/14  3/16  3/21  3/23  3/26  3/29  4/       L shoulder PROM  AN  AN  AN  AN  AN NP AN  EV       GHJ AP/Inf mobs prn  AN Mckayla Andrew NP AN  NP          15'  15'  15'  15'  15'  10'  10 min                                                             Exercise Diary   3/6  3/14  3/16  3/21  3/23  3/26  3/29  4/4       Pulleys 6 min 6 min 6 min 6 min 6 min 6 min 5 min 6 min       Finger ladder 10"x10 10"x10 10"x10 10"x10 10"x10 D/C ----  ---       Pendulums 30 ea 30 ea 30 ea D/C ---- ---- ----  ---       Wall slides NP NP NP 10"x10 D/C ---- ---- ---       Table slides scaption 10"x10 10"x10 10"x10 D/C ---- ---- ----  ---       ER table stretch 10"x10 10"x10 10"x10 10"x10 10"x10 10"x10 D/C  ---       AAROM wand flexion 10"x10 10"x10 D/C ---- ---- ---- ----  ---       AAROM wand abduction/ER 10"x10 10"x10 10"x10 10"x10 10"x10 10"x10 10"x10  10"x  10 ea       Biceps curls 30x 30x 40x 40x 1# x30 2# x30 2# x30  2# x20       Skull crushers NP 2x10 3x10 3x10 3x10 1# x30 1# x30  missed       Prone Y/T NP NP NP NV NV x10 ea 2x10  2x10 ea       Prone rows/extensions NP NP NP 2x10 2x10 2x10 2# 2x10  2# 2x10 ea       Scapular retractions with B/L shoulder ER NP 3"x30 3"x30 3"x30 3"x30 Pink 3"x30 Pink 3"x30  missed       Standing shoulder flexion  NV 10"x10 20x 2x10 1# x20 1# x20  1# 2x20       4 way isometric       NV 10"x10 10"x10 10"x10  10"x  10 ea       Wall ball circles cw/ccw       NV NV 30 ea 2x20 ea  2x20 ea       UBE            NV  NV       Sleeper stretch             10"x10  10"x  10       TB rows/ext             Green 2x10  green 2x10 ea       Standing rows               2# x30             Modalities     3/14  3/16  3/21  3/23  3/26  3/29  4/4       CP prn  10' post NP NP NP 10' post 10' post  10 min post                                                         25 min of exercise not supervised

## 2018-04-06 ENCOUNTER — OFFICE VISIT (OUTPATIENT)
Dept: PHYSICAL THERAPY | Facility: CLINIC | Age: 48
End: 2018-04-06
Payer: COMMERCIAL

## 2018-04-06 DIAGNOSIS — S43.432D SUPERIOR GLENOID LABRUM LESION OF LEFT SHOULDER, SUBSEQUENT ENCOUNTER: Primary | ICD-10-CM

## 2018-04-06 PROCEDURE — 97140 MANUAL THERAPY 1/> REGIONS: CPT

## 2018-04-06 PROCEDURE — 97110 THERAPEUTIC EXERCISES: CPT

## 2018-04-06 NOTE — PROGRESS NOTES
Daily Note     Today's date: 2018  Patient name: Nila Rodas  : 1970  MRN: [id number]  Referring provider: Maria G Morales MD  Dx:   Encounter Diagnosis     ICD-10-CM    1  Superior glenoid labrum lesion of left shoulder, subsequent encounter S43 432D               Subjective: Patient reports his L shoulder is feeling well since previous treatment, denies any pain or difficulty with home exercises  Objective: See treatment diary below  Assessment: Tolerates treatment well  Early muscle fatigue noted during strength program today with progression of resistance and volume  Continue progressing as patient tolerates  Plan: Continue per plan of care  Progress treatment as tolerated             Precautions: no strengthening until 6 weeks post-op     Daily Treatment Diary      Manual   3/6  3/14  3/16  3/21  3/23  3/26  3/29  4/4  4/6     L shoulder PROM  AN  AN  AN  AN  AN NP AN  EV  AN     GHJ AP/Inf mobs prn  AN Anitra Schaumann NP AN  NP  NP        15'  15'  15'  15'  15'  10'  10 min  10'                                                           Exercise Diary   3/6  3/14  3/16  3/21  3/23  3/26  3/29  4/4  4/6     Pulleys 6 min 6 min 6 min 6 min 6 min 6 min 5 min 6 min D/C     Finger ladder 10"x10 10"x10 10"x10 10"x10 10"x10 D/C ----  --- ----     Pendulums 30 ea 30 ea 30 ea D/C ---- ---- ----  --- ----     Wall slides NP NP NP 10"x10 D/C ---- ---- --- ----     Table slides scaption 10"x10 10"x10 10"x10 D/C ---- ---- ----  --- ----     ER table stretch 10"x10 10"x10 10"x10 10"x10 10"x10 10"x10 D/C  --- ----     AAROM wand flexion 10"x10 10"x10 D/C ---- ---- ---- ----  --- ----     AAROM wand abduction/ER 10"x10 10"x10 10"x10 10"x10 10"x10 10"x10 10"x10  10"x  10 ea D/C     Biceps curls 30x 30x 40x 40x 1# x30 2# x30 2# x30  2# x20 3# x20     Skull crushers NP 2x10 3x10 3x10 3x10 1# x30 1# x30  missed 3# x20     Prone Y/T NP NP NP NV NV x10 ea 2x10  2x10 ea 2# x20     Prone rows/extensions NP NP NP 2x10 2x10 2x10 2# 2x10  2# 2x10 ea 3# x20     Scapular retractions with B/L shoulder ER NP 3"x30 3"x30 3"x30 3"x30 Pink 3"x30 Pink 3"x30  missed D/C     Standing shoulder flexion  NV 10"x10 20x 2x10 1# x20 1# x20  1# 2x20 2# 2x10     4 way isometric       NV 10"x10 10"x10 10"x10  10"x  10 ea D/C     Wall ball circles cw/ccw       NV NV 30 ea 2x20 ea  2x20 ea 20x4 ea     UBE            NV  NV 3'/3' L2     Sleeper stretch             10"x10  10"x  10 10" x 10     TB rows/ext             Green 2x10  green 2x10 ea Blue 2x10     Bent over rows               2# x30 3# x30           Modalities     3/14  3/16  3/21  3/23  3/26  3/29  4/4  4/6     CP prn  10' post NP NP NP 10' post 10' post  10 min post 10' post                                                       25 min of exercise not supervised

## 2018-04-11 ENCOUNTER — OFFICE VISIT (OUTPATIENT)
Dept: PHYSICAL THERAPY | Facility: CLINIC | Age: 48
End: 2018-04-11
Payer: COMMERCIAL

## 2018-04-11 DIAGNOSIS — S43.432D SUPERIOR GLENOID LABRUM LESION OF LEFT SHOULDER, SUBSEQUENT ENCOUNTER: Primary | ICD-10-CM

## 2018-04-11 PROCEDURE — 97110 THERAPEUTIC EXERCISES: CPT

## 2018-04-11 PROCEDURE — 97140 MANUAL THERAPY 1/> REGIONS: CPT

## 2018-04-11 NOTE — PROGRESS NOTES
Daily Note     Today's date: 2018  Patient name: Cara Swain  : 1970  MRN: [id number]  Referring provider: Maricarmen Campa MD  Dx:   Encounter Diagnosis     ICD-10-CM    1  Superior glenoid labrum lesion of left shoulder, subsequent encounter S43 432D               Subjective: Patient will be 9 weeks post-op tomorrow (  )  Reduced stiffness in the morning with improved tolerance to sleeping in L sidelying  Was playing with son recently when he jumped up to catch an [facility] ball, causing some mild discomfort  Objective: See treatment diary below  Progressed strengthening  Assessment: Demonstrates fatigue with shoulder and scapular strengthening  PROM continues to be utilized to maintain gained mobility that is within functional ROM  Plan: Continue per plan of care  Progress treatment as tolerated             Precautions: no strengthening until 6 weeks post-op     Daily Treatment Diary      Manual   3/6  3/14  3/16  3/21  3/23  3/26  3/29  4/4  4/6 4/11   L shoulder PROM  AN  AN  AN  AN  AN NP AN  EV  AN  EV   GHJ AP/Inf mobs prn  AN  AN Isi  NP AN  NP  NP  NP      15'  15'  15'  15'  15'  10'  10 min  10'  10 min                                                         Exercise Diary   3/6  3/14  3/16  3/21  3/23  3/26  3/29  4/4  4/6  4/11   Pulleys 6 min 6 min 6 min 6 min 6 min 6 min 5 min 6 min D/C  ---   Finger ladder 10"x10 10"x10 10"x10 10"x10 10"x10 D/C ----  --- ----  ---   Pendulums 30 ea 30 ea 30 ea D/C ---- ---- ----  --- ----  ---   Wall slides NP NP NP 10"x10 D/C ---- ---- --- ----  ---   Table slides scaption 10"x10 10"x10 10"x10 D/C ---- ---- ----  --- ----  ---   ER table stretch 10"x10 10"x10 10"x10 10"x10 10"x10 10"x10 D/C  --- ----  ---   AAROM wand flexion 10"x10 10"x10 D/C ---- ---- ---- ----  --- ----  ---   [place] w[place] abduction/ER 10"x10 10"x10 10"x10 10"x10 10"x10 10"x10 10"x10  10"x  10 ea D/C  ---   Biceps curls 30x 30x 40x 40x 1# x30 2# x30 2# x30  2# x20 3# x20  3# x20   Skull crushers NP 2x10 3x10 3x10 3x10 1# x30 1# x30  missed 3# x20  3# x20   Prone Y/T NP NP NP NV NV x10 ea 2x10  2x10 ea 2# x20  3# x20 ea   Prone rows/extensions NP NP NP 2x10 2x10 2x10 2# 2x10  2# 2x10 ea 3# x20  3# x20 ea   Scapular retractions with B/L shoulder ER NP 3"x30 3"x30 3"x30 3"x30 Pink 3"x30 Pink 3"x30  missed D/C  ---   Standing shoulder flexion  NV 10"x10 20x 2x10 1# x20 1# x20  1# 2x20 2# 2x10 2# 2x10   4 way isometric       NV 10"x10 10"x10 10"x10  10"x  10 ea D/C  ---   Wall ball circles cw/ccw       NV NV 30 ea 2x20 ea  2x20 ea 20x4 ea  20x4 ea   UBE            NV  NV 3'/3' L2 3 min ea L3   Sleeper stretch             10"x10  10"x  10 10" x 10  10"x 10   TB rows/ext             Green 2x10  green 2x10 ea Blue 2x10  blue 2x10 ea   Bent over rows               2# x30 3# x30  3# x30         Modalities     3/14  3/16  3/21  3/23  3/26  3/29  4/4  4/6 4/11   CP prn  10' post NP NP NP 10' post 10' post  10 min post 10' post deferred

## 2018-04-13 ENCOUNTER — OFFICE VISIT (OUTPATIENT)
Dept: PHYSICAL THERAPY | Facility: CLINIC | Age: 48
End: 2018-04-13
Payer: COMMERCIAL

## 2018-04-13 DIAGNOSIS — S43.432D SUPERIOR GLENOID LABRUM LESION OF LEFT SHOULDER, SUBSEQUENT ENCOUNTER: Primary | ICD-10-CM

## 2018-04-13 PROCEDURE — 97110 THERAPEUTIC EXERCISES: CPT

## 2018-04-13 NOTE — PROGRESS NOTES
PT Re-Evaluation     Today's date: 2018  Patient name: Keely Manley  : 1970  MRN: [id number]  Referring provider: Taqueria Trujillo MD  Dx:   Encounter Diagnosis     ICD-10-CM    1  Superior glenoid labrum lesion of left shoulder, subsequent encounter S43 432D                   Assessment  Impairments: abnormal or restricted ROM and impaired physical strength  Functional limitations: limited with participating in recreational activities  Assessment details: Keely Manley is 9 weeks post-op biceps tenodesis  He has been compliant with attending PT and HEP since initial eval  Klever Interiano has made improvements in objective data since IE but is still limited compared to normal  Klever Interiano still has slight limitation in L shoulder AROM and strength, and would benefit from additional skilled PT to address these deficits to return to PLOF  Understanding of Dx/Px/POC: excellent  Goals  STG  1) L shoulder ROM will improve 50% in 4 weeks  -Met  2) L shoulder strength will improve 1/2 grade in 4 weeks  -Met  3) Patient is independent with ADLs and dressing in 4 weeks  -Met  LTG  1) Patient is independent in HEP within 8 weeks  -Partially met  2) Patient can lift 5 pounds above shoulder height onto a shelf with 0/10 pain within 8 weeks  -Not met  3) Patient has full, WNL L shoulder ROM in 8 weeks  -Not met  4) Patient will participate in at least 50% of prior recreational activity within 8 weeks   Not met    Plan  Patient would benefit from: skilled PT  Planned modality interventions: cryotherapy, TENS and unattended electrical stimulation  Planned therapy interventions: home exercise program, therapeutic exercise, therapeutic activities, stretching, strengthening, postural training, patient education, neuromuscular re-education, manual therapy and joint mobilization  Frequency: 2x week  Duration in weeks: 2  Treatment plan discussed with: patient        Subjective Evaluation    History of Present Illness  Date of surgery: 2018  Mechanism of injury: Patient is now 9 weeks s/p L labral repair with biceps tenodesis  He reports he is now independent in all of his home activities, not requiring assistance for reaching into cabinets or dressing, etc  He states he has been going to the gym and using elliptical machine with arms swinging with machine, not having any discomfort or pain with this    Quality of life: good    Pain  Current pain ratin  At best pain ratin  At worst pain ratin  Quality: tight    Hand dominance: left    Patient Goals  Patient goals for therapy: increased motion, increased strength, return to sport/leisure activities and independence with ADLs/IADLs          Objective     Postural Observations  Seated posture: good  Standing posture: good        Cervical/Thoracic Screen   Cervical range of motion within normal limits  Thoracic range of motion within normal limits    Active Range of Motion   Left Shoulder   Flexion: 168 degrees   Abduction: 162 degrees   External rotation BTH: T2   Internal rotation BTB: T7     Strength/Myotome Testing     Left Shoulder     Planes of Motion   Flexion: 5   Extension: 4+   Abduction: 4+   External rotation at 0°: 4+   Internal rotation at 0°: 5     Isolated Muscles   Biceps: 4+   Lower trapezius: 4   Middle trapezius: 4+   Rhomboids: 4+   Triceps: 4+       Precautions: none    Daily Treatment Diary       Manuals             L shoulder PROM NP            GHJ inf/AP mobs NP                                      Exercise Diary              UBE fwd/back L 4 5 3'/3'            Ball circles cw/ccw 1# 20x3            Forward raise 2# x30            Lateral raise NV            Bicep curls 5# x30            Bent over rows 5# x30            TB rows/ext Blue x30 Black           TB robbers Blue x30            TB IR/ER NV            Skull crushers 5# x30            Serratus punches 5# x40            Sleeper stretch 30" x3            Prone rows/ext 5# x20            Prone Y/T 3# x20 Modalities

## 2018-04-18 ENCOUNTER — TELEPHONE (OUTPATIENT)
Dept: FAMILY MEDICINE CLINIC | Facility: CLINIC | Age: 48
End: 2018-04-18

## 2018-04-18 ENCOUNTER — OFFICE VISIT (OUTPATIENT)
Dept: PHYSICAL THERAPY | Facility: CLINIC | Age: 48
End: 2018-04-18
Payer: COMMERCIAL

## 2018-04-18 DIAGNOSIS — S43.432D SUPERIOR GLENOID LABRUM LESION OF LEFT SHOULDER, SUBSEQUENT ENCOUNTER: Primary | ICD-10-CM

## 2018-04-18 PROCEDURE — 97110 THERAPEUTIC EXERCISES: CPT

## 2018-04-18 NOTE — PROGRESS NOTES
Daily Note     Today's date: 2018  Patient name: Vero Bolivar  : 1970  MRN: [id number]  Referring provider: Abi Kearns MD  Dx:   Encounter Diagnosis     ICD-10-CM    1  Superior glenoid labrum lesion of left shoulder, subsequent encounter S43 432D                   Subjective: Patient will be 10 weeks post-op tomorrow (  )  Reported with gym routine, has experienced some increased shoulder soreness  Occasional symptoms present, such as noticeable this morning while sitting at a meeting  Objective: See treatment diary below  FOTO score improved from 57 to 84 over 13 visits  Assessment: Tolerated treatment well  Progresseing with shoulder and scapular strengthening, demonstrating muscle fatigue related to unresolved weakness  Plan: Plan to discharge next visit        Precautions: none    Daily Treatment Diary       Manuals            L shoulder PROM NP NP           GHJ inf/AP mobs NP NP                                     Exercise Diary              UBE fwd/back L 4 5 3'/3' L4 5  3 min ea           Ball circles cw/ccw 1# 20x3 1# 20x3           Forward raise 2# x30 2# x30           Lateral raise NV 2# 2x10           Bicep curls 5# x30 5# x30           Bent over rows 5# x30 5# x30           TB rows/ext Blue x30 Black x30           TB robbers Blue x30 Blue x30           TB IR/ER NV Green 2x10 ea           Skull crushers 5# x30 5# x30           Serratus punches 5# x40 5# 20x2           Sleeper stretch 30" x3 30"x3           Prone rows/ext 5# x20 5# x30           Prone Y/T 3# x20 3# x20                                                                                                                   Modalities

## 2018-04-19 DIAGNOSIS — G89.4 CHRONIC PAIN DISORDER: ICD-10-CM

## 2018-04-19 RX ORDER — HYDROCODONE BITARTRATE AND ACETAMINOPHEN 5; 325 MG/1; MG/1
1 TABLET ORAL 2 TIMES DAILY PRN
Qty: 60 TABLET | Refills: 0 | Status: SHIPPED | OUTPATIENT
Start: 2018-04-19 | End: 2018-05-19

## 2018-04-20 ENCOUNTER — OFFICE VISIT (OUTPATIENT)
Dept: PHYSICAL THERAPY | Facility: CLINIC | Age: 48
End: 2018-04-20
Payer: COMMERCIAL

## 2018-04-20 DIAGNOSIS — S43.432D SUPERIOR GLENOID LABRUM LESION OF LEFT SHOULDER, SUBSEQUENT ENCOUNTER: Primary | ICD-10-CM

## 2018-04-20 PROCEDURE — 97110 THERAPEUTIC EXERCISES: CPT

## 2018-04-20 PROCEDURE — G8992 OTHER PT/OT  D/C STATUS: HCPCS

## 2018-04-20 PROCEDURE — G8991 OTHER PT/OT GOAL STATUS: HCPCS

## 2018-04-20 NOTE — PROGRESS NOTES
PT Discharge    Today's date: 2018  Patient name: Italo Sales  : 1970  MRN: [id number]  Referring provider: Dylan Harris MD  Dx:   Encounter Diagnosis     ICD-10-CM    1  Superior glenoid labrum lesion of left shoulder, subsequent encounter S43 432D                   Assessment  Impairments: abnormal or restricted ROM  Functional limitations: limited with participating in recreational activities  Assessment details: Italo Sales is 10 weeks post-op biceps tenodesis  He has been compliant with attending PT and HEP since initial eval  Shan Aas has made improvements in objective data since IE and has maximized function  Patient is agreeable to d/c to HEP at this time, and will contact clinic with any exercise related questions or concerns as needed  Understanding of Dx/Px/POC: excellent  Goals  STG  1) L shoulder ROM will improve 50% in 4 weeks  -Met  2) L shoulder strength will improve 1/2 grade in 4 weeks  -Met  3) Patient is independent with ADLs and dressing in 4 weeks  -Met  LTG  1) Patient is independent in HEP within 8 weeks  -Met  2) Patient can lift 5 pounds above shoulder height onto a shelf with 0/10 pain within 8 weeks  -Partially met  3) Patient has full, WNL L shoulder ROM in 8 weeks  -Partially met  4) Patient will participate in at least 50% of prior recreational activity within 8 weeks  -Partially met    Plan  Planned therapy interventions: home exercise program  Treatment plan discussed with: patient        Subjective Evaluation    History of Present Illness  Date of surgery: 2018  Mechanism of injury: Patient is 10 weeks s/p L labral repair with biceps tenodesis  He has been going to the gym and using elliptical machine with arms swinging with machine for the past week and a half with no pain or discomfort  He also notes he began with light UE strengthening using equipment at the gym to complete exercises he has been doing in therapy   He states that he felt some muscle soreness on Wednesday evening after light lifting at the gym followed by therapy session, but this soreness subsided by the next day    Quality of life: good    Pain  Current pain ratin  At best pain ratin  At worst pain ratin  Quality: tight    Hand dominance: left    Patient Goals  Patient goals for therapy: increased motion, increased strength, return to sport/leisure activities and independence with ADLs/IADLs          Objective     Postural Observations  Seated posture: good  Standing posture: good        Cervical/Thoracic Screen   Cervical range of motion within normal limits  Thoracic range of motion within normal limits    Active Range of Motion   Left Shoulder   Flexion: 170 degrees   Abduction: 170 degrees   External rotation BTH: T4   Internal rotation BTB: T7     Strength/Myotome Testing     Left Shoulder     Planes of Motion   Flexion: 5   Extension: 5   Abduction: 5   External rotation at 0°: 5   Internal rotation at 0°: 5     Isolated Muscles   Biceps: 5   Lower trapezius: 4+   Middle trapezius: 5   Rhomboids: 5   Triceps: 5     Precautions: none     Daily Treatment Diary         Manuals                  L shoulder PROM NP NP NP                 GHJ inf/AP mobs NP NP NP                                                                 Exercise Diary                        UBE fwd/back L 4 5 3'/3' L4 5  3 min ea L5 3/3                 Ball circles cw/ccw 1# 20x3 1# 20x3 1# 20x4                 Forward raise 2# x30 2# x30 3# x30                 Lateral raise NV 2# 2x10 3# x20                 Bicep curls 5# x30 5# x30 5# x30                 Bent over rows 5# x30 5# x30 5# x30                 TB rows/ext Blue x30 Black x30 Black x30                 TB robbers Blue x30 Blue x30 Blue x30                 TB IR/ER NV Green 2x10 ea Green x30                 Skull crushers 5# x30 5# x30 5# x30                 Serratus punches 5# x40 5# 20x2 5# x40                 Sleeper stretch 30" x3 30"x3 30"x3                 Prone rows/ext 5# x20 5# x30 5# x30                 Prone Y/T 3# x20 3# x20 3# x20                                                                                                                                                                                                                Modalities

## 2018-05-11 DIAGNOSIS — R73.01 IMPAIRED FASTING GLUCOSE: ICD-10-CM

## 2018-05-11 DIAGNOSIS — M25.512 PAIN IN LEFT SHOULDER: ICD-10-CM

## 2018-05-11 DIAGNOSIS — E66.3 OVERWEIGHT: ICD-10-CM

## 2018-05-11 DIAGNOSIS — F11.90 UNCOMPLICATED OPIOID USE: ICD-10-CM

## 2018-05-11 DIAGNOSIS — E78.2 MIXED HYPERLIPIDEMIA: ICD-10-CM

## 2018-06-12 DIAGNOSIS — L65.9 HAIR LOSS: Primary | ICD-10-CM

## 2018-06-12 RX ORDER — FINASTERIDE 1 MG/1
TABLET, FILM COATED ORAL
Qty: 90 TABLET | Refills: 1 | Status: SHIPPED | OUTPATIENT
Start: 2018-06-12 | End: 2018-12-10 | Stop reason: CLARIF

## 2018-11-07 ENCOUNTER — TELEPHONE (OUTPATIENT)
Dept: OBGYN CLINIC | Facility: HOSPITAL | Age: 48
End: 2018-11-07

## 2018-11-07 DIAGNOSIS — S43.432D SUPERIOR GLENOID LABRUM LESION OF LEFT SHOULDER, SUBSEQUENT ENCOUNTER: Primary | ICD-10-CM

## 2018-11-07 NOTE — TELEPHONE ENCOUNTER
Patient sees Dr Maikol Bunn  Patient had left shoulder sx in Feb 2018  He would like to go back for physical therapy  He went to a [facility]'s PT office and they advised he needs a new order put into Epic      Please advise    Patient call back

## 2018-11-12 ENCOUNTER — OFFICE VISIT (OUTPATIENT)
Dept: FAMILY MEDICINE CLINIC | Facility: CLINIC | Age: 48
End: 2018-11-12
Payer: COMMERCIAL

## 2018-11-12 VITALS
HEART RATE: 72 BPM | SYSTOLIC BLOOD PRESSURE: 118 MMHG | BODY MASS INDEX: 29.88 KG/M2 | TEMPERATURE: 98.2 F | RESPIRATION RATE: 16 BRPM | HEIGHT: 71 IN | DIASTOLIC BLOOD PRESSURE: 78 MMHG | WEIGHT: 213.4 LBS

## 2018-11-12 DIAGNOSIS — F41.8 DEPRESSION WITH ANXIETY: ICD-10-CM

## 2018-11-12 DIAGNOSIS — E66.3 OVERWEIGHT (BMI 25.0-29.9): ICD-10-CM

## 2018-11-12 DIAGNOSIS — R73.01 IMPAIRED FASTING GLUCOSE: ICD-10-CM

## 2018-11-12 DIAGNOSIS — B00.1 COLD SORE: ICD-10-CM

## 2018-11-12 DIAGNOSIS — E78.2 MIXED HYPERLIPIDEMIA: Primary | ICD-10-CM

## 2018-11-12 DIAGNOSIS — L65.9 HAIR LOSS: ICD-10-CM

## 2018-11-12 PROCEDURE — 3008F BODY MASS INDEX DOCD: CPT | Performed by: FAMILY MEDICINE

## 2018-11-12 PROCEDURE — 99214 OFFICE O/P EST MOD 30 MIN: CPT | Performed by: FAMILY MEDICINE

## 2018-11-12 RX ORDER — ESCITALOPRAM OXALATE 5 MG/1
5 TABLET ORAL DAILY
Qty: 30 TABLET | Refills: 5 | Status: SHIPPED | OUTPATIENT
Start: 2018-11-12 | End: 2018-11-28 | Stop reason: SDUPTHER

## 2018-11-12 RX ORDER — VALACYCLOVIR HYDROCHLORIDE 1 G/1
2000 TABLET, FILM COATED ORAL 2 TIMES DAILY
Qty: 8 TABLET | Refills: 0 | Status: SHIPPED | OUTPATIENT
Start: 2018-11-12 | End: 2020-01-23 | Stop reason: SDUPTHER

## 2018-11-12 NOTE — PROGRESS NOTES
Chief Complaint   Patient presents with    Follow-up     6 month follow up  Health Maintenance   Topic Date Due    INFLUENZA VACCINE  07/01/2018    Depression Screening PHQ  03/02/2019    DTaP,Tdap,and Td Vaccines (2 - Td) 10/29/2026     Assessment/Plan:    Hyperlipidemia---low fat diet  Will check labs  Depression/anxiety---PHQ-9 score 8 today in office  CARLOS-7 score 12 today in office  Give lexapro  SE educated pt  Give referral for psychiatry  Continue FU therapist    IFG---low carb diet  Will check labs  Cold sore---Give refills of valtrex  Hair loss---continue finasteride  Will check PSA  Overweight---lose weight  Got flu shot this season  RTO in 1 month  Diagnoses and all orders for this visit:    Mixed hyperlipidemia  -     Comprehensive metabolic panel; Future  -     CBC and differential; Future  -     Lipid Panel with Direct LDL reflex; Future    Depression with anxiety  -     escitalopram (LEXAPRO) 5 mg tablet; Take 1 tablet (5 mg total) by mouth daily for 30 days  -     TSH, 3rd generation with Free T4 reflex; Future  -     Ambulatory referral to Psychiatry; Future    Impaired fasting glucose  -     Hemoglobin A1C; Future    Cold sore  -     valACYclovir (VALTREX) 1,000 mg tablet; Take 2 tablets (2,000 mg total) by mouth 2 (two) times a day for 2 days    Hair loss  -     PSA, total and free; Future    Overweight (BMI 25 0-29  9)          Subjective:      Patient ID: Jaspal Kenney is a 57 y o  male  HPI    Pt is here by himself  Hyperlipidemia--Diet control  Not on any statins  No recent labs  Would like to check labs  Depression/anxiety----for years  Never seen any providers for it  FU therapist recently who recommend medications  Never had medications before  Denies suicidal ideation  Mother had depression/anxiety/?bipolar  Cousins had depression/anxiety per pt  IFG---Eat healthy per pt  No recent labs       Hair loss/male baldness---He is on finasteride for years which works OK  Cold sore---use valtrex as needed  Not everyday  Last flare up was 1 year ago  Overweight---BMI 29 76 today  Pt states he exercises regularly  The following portions of the patient's history were reviewed and updated as appropriate: allergies, current medications, past family history, past medical history, past social history, past surgical history and problem list     Review of Systems   Constitutional: Negative for appetite change, chills and fever  HENT: Negative for congestion, ear pain, sinus pain and sore throat  Eyes: Negative for discharge and itching  Respiratory: Negative for apnea, cough, chest tightness, shortness of breath and wheezing  Cardiovascular: Negative for chest pain, palpitations and leg swelling  Gastrointestinal: Negative for abdominal pain, anal bleeding, constipation, diarrhea, nausea and vomiting  Endocrine: Negative for cold intolerance, heat intolerance and polyuria  Genitourinary: Negative for difficulty urinating and dysuria  Musculoskeletal: Negative for arthralgias, back pain and myalgias  Skin: Negative for rash  Neurological: Negative for dizziness and headaches  Psychiatric/Behavioral: Negative for agitation  Objective:      /78 (BP Location: Right arm, Patient Position: Sitting, Cuff Size: Large)   Pulse 72   Temp 98 2 °F (36 8 °C) (Tympanic)   Resp 16   Ht 5' 11" (1 803 m)   Wt 96 8 kg (213 lb 6 4 oz)   BMI 29 76 kg/m²          Physical Exam   Constitutional: He appears well-developed  HENT:   Head: Normocephalic and atraumatic  Eyes: Pupils are equal, round, and reactive to light  Conjunctivae are normal    Neck: Normal range of motion  Neck supple  Cardiovascular: Normal rate, regular rhythm, normal heart sounds and intact distal pulses  Pulmonary/Chest: Effort normal and breath sounds normal    Abdominal: Soft   Bowel sounds are normal    Musculoskeletal: Normal range of motion  Neurological: He is alert

## 2018-11-15 ENCOUNTER — APPOINTMENT (OUTPATIENT)
Dept: PHYSICAL THERAPY | Facility: CLINIC | Age: 48
End: 2018-11-15
Payer: COMMERCIAL

## 2018-11-21 LAB — HBA1C MFR BLD HPLC: 5.4 %

## 2018-11-27 ENCOUNTER — EVALUATION (OUTPATIENT)
Dept: PHYSICAL THERAPY | Facility: CLINIC | Age: 48
End: 2018-11-27
Payer: COMMERCIAL

## 2018-11-27 DIAGNOSIS — G89.29 CHRONIC LEFT SHOULDER PAIN: Primary | ICD-10-CM

## 2018-11-27 DIAGNOSIS — M25.512 CHRONIC LEFT SHOULDER PAIN: Primary | ICD-10-CM

## 2018-11-27 PROCEDURE — G8990 OTHER PT/OT CURRENT STATUS: HCPCS

## 2018-11-27 PROCEDURE — G8991 OTHER PT/OT GOAL STATUS: HCPCS

## 2018-11-27 PROCEDURE — 97161 PT EVAL LOW COMPLEX 20 MIN: CPT

## 2018-11-27 NOTE — PROGRESS NOTES
PT Evaluation     Today's date: 2018  Patient name: Tab Salcedo  : 1970  MRN: [id number]  Referring provider: Dayron Arora MD  Dx:   Encounter Diagnosis     ICD-10-CM    1  Chronic left shoulder pain M25 512     G89 29                   Assessment  Assessment details: Tab Salcedo is a 57 y o  male presenting to PT with pain, decreased L shoulder and periscapular strength, and decreased tolerance to activity  Patient would benefit from skilled PT services to address these impairments and to maximize function in order to improve quality of life  Thank you for the referral   Impairments: impaired physical strength, lacks appropriate home exercise program and pain with function  Functional limitations: pain with recreational activities (weight lifting, household chores)Understanding of Dx/Px/POC: good   Prognosis: good    Goals  STG  1) L shoulder strength will improve 1/2 grade in 3 weeks  2) Patient will have 50% improvement in pain in 3 weeks with functional activities  LTG  1) Patient is independent in HEP within 6 weeks  2) Patient completes overhead and recreational activity with 0-1/10 pain within 6 weeks  Plan  Patient would benefit from: skilled physical therapy  Planned modality interventions: cryotherapy  Planned therapy interventions: home exercise program, therapeutic exercise, therapeutic activities, stretching, strengthening, postural training, patient education, neuromuscular re-education, body mechanics training and flexibility  Other planned therapy interventions: Personalized blood flow restriction  Frequency: 2x week  Duration in weeks: 6  Treatment plan discussed with: patient        Subjective Evaluation    History of Present Illness  Mechanism of injury: Patient presents with L shoulder pain beginning about 2 months ago  He had a superior glenoid labrum lesion repair back in February, and had successful course of PT since then   He states he gradually worked up to resistance training with more weight in the gym, and more recently has had increased anterior shoulder pain, particularly after lifting (bench press, shoulder press, etc )  Quality of life: good    Pain  Current pain ratin  At worst pain ratin  Quality: sharp and discomfort  Relieving factors: ice, medications and rest  Aggravating factors: overhead activity and lifting  Progression: no change    Patient Goals  Patient goals for therapy: decreased pain and increased strength          Objective     Postural Observations  Seated posture: good  Standing posture: good        Palpation   Left   Tenderness of the biceps  Left Shoulder Comments  Left biceps: TTP at biceps tendon with resisted elbow flexion  Tenderness     Left Shoulder   Tenderness in the biceps tendon (proximal) and bicipital groove  No tenderness in the [facility] joint, acromion, subacromial bursa and supraspinatus tendon  Cervical/Thoracic Screen   Cervical range of motion within normal limits  Thoracic range of motion within normal limits    Active Range of Motion   Left Shoulder   Normal active range of motion    Additional Active Range of Motion Details  Mild pain reported at end range abduction    Joint Play   Left Shoulder  Joints within functional limits are the anterior capsule, posterior capsule and inferior capsule  Strength/Myotome Testing     Left Shoulder     Planes of Motion   Flexion: 4+   Extension: 4+   Abduction: 4   External rotation at 0°: 4   Internal rotation at 0°: 4+   Horizontal abduction: 4     Isolated Muscles   Biceps: 4+   Lower trapezius: 4   Middle trapezius: 4   Rhomboids: 4     Tests     Left Shoulder   Negative empty can, full can, Hawkin's, Neer's, painful arc, passive horizontal adduction and Speed's         Flowsheet Rows      Most Recent Value   PT/OT G-Codes   Current Score  75   Projected Score  76   FOTO information reviewed  Yes   Assessment Type  Evaluation   G code set  Other PT/OT Primary Other PT Primary Current Status ()  CJ   Other PT Primary Goal Status ()  CJ          Precautions: depression with anxiety    Daily Treatment Diary     Manuals                                                                 Exercise Diary              UBE warm-up                          PBFR (green)                          PBFR- AROM scaption             PBFR- TB ER             PBFR- Wall push-ups             PBFR- S/L ER                                                                                                                                                                                      Modalities             CP post prn

## 2018-11-28 ENCOUNTER — OFFICE VISIT (OUTPATIENT)
Dept: PHYSICAL THERAPY | Facility: CLINIC | Age: 48
End: 2018-11-28
Payer: COMMERCIAL

## 2018-11-28 ENCOUNTER — TELEPHONE (OUTPATIENT)
Dept: FAMILY MEDICINE CLINIC | Facility: CLINIC | Age: 48
End: 2018-11-28

## 2018-11-28 DIAGNOSIS — G89.29 CHRONIC LEFT SHOULDER PAIN: Primary | ICD-10-CM

## 2018-11-28 DIAGNOSIS — M25.512 CHRONIC LEFT SHOULDER PAIN: Primary | ICD-10-CM

## 2018-11-28 DIAGNOSIS — F41.8 DEPRESSION WITH ANXIETY: ICD-10-CM

## 2018-11-28 PROCEDURE — 97110 THERAPEUTIC EXERCISES: CPT

## 2018-11-28 RX ORDER — ESCITALOPRAM OXALATE 10 MG/1
10 TABLET ORAL DAILY
Qty: 30 TABLET | Refills: 5 | Status: SHIPPED | OUTPATIENT
Start: 2018-11-28 | End: 2019-01-07 | Stop reason: ALTCHOICE

## 2018-11-28 NOTE — TELEPHONE ENCOUNTER
I called pt back and he states he tried lexapro 5mg for 2 weeks  No side effects  Does not seem help much  He talked with therapist who suggest 10mg daily  I will send lexapro 10mg QD to his pharmacy

## 2018-11-28 NOTE — TELEPHONE ENCOUNTER
Requesting Dr Rodrigo Yoo call him  Patient has medication questions  Suggested to speak with a MA  Patient, said, only wanted to speak to Dr Rodrigo Yoo   [phone]

## 2018-11-28 NOTE — PROGRESS NOTES
Daily Note     Today's date: 2018  Patient name: Stephen Dockery  : 1970  MRN: [id number]  Referring provider: Cristal Leon MD  Dx:   Encounter Diagnosis     ICD-10-CM    1  Chronic left shoulder pain M25 512     G89 29                   Subjective: Patient reports he completed a chest workout this morning at the gym  He states he had no increased L shoulder pain with this  Objective: See treatment diary below  Assessment: Tolerated treatment well  Patient able to complete planned interventions with PBFR unit today, showing early muscle fatigue with each exercise  Noted RTC weakness, particularly with ER, and patient would benefit from continued PT to improve muscle strength and overall function  Plan: Continue per plan of care         Precautions: depression with anxiety    Daily Treatment Diary     Manuals                                                                 Exercise Diary              UBE warm-up 3'/3'                         PBFR (green)             LOP (mmHg) 182            PTP (mmHg) 91                                      PBFR- AROM scaption 30/15/15/15            PBFR- TB ER PTB 30/15/15/15            PBFR- Wall push-ups 30/15/15/15            PBFR- S/L ER 1# 30/15/15/15                                                                                                                                                                                     Modalities             CP post prn 10 min

## 2018-12-04 ENCOUNTER — OFFICE VISIT (OUTPATIENT)
Dept: PHYSICAL THERAPY | Facility: CLINIC | Age: 48
End: 2018-12-04
Payer: COMMERCIAL

## 2018-12-04 DIAGNOSIS — M25.512 CHRONIC LEFT SHOULDER PAIN: Primary | ICD-10-CM

## 2018-12-04 DIAGNOSIS — G89.29 CHRONIC LEFT SHOULDER PAIN: Primary | ICD-10-CM

## 2018-12-04 PROCEDURE — 97110 THERAPEUTIC EXERCISES: CPT

## 2018-12-04 NOTE — PROGRESS NOTES
Daily Note     Today's date: 2018  Patient name: Dorothy Barrios  : 1970  MRN: [id number]  Referring provider: Pallavi Blevins MD  Dx:   Encounter Diagnosis     ICD-10-CM    1  Chronic left shoulder pain M25 512     G89 29                   Subjective: Patient tells me his shoulder felt "really good" after last session using PBFR unit with exercises  Objective: See treatment diary below  Assessment: Tolerated treatment well  Patient with no adverse effects during session today, citing muscle soreness likely due to weakness and effects from BFR and exercise  Patient would benefit from continued PT to improve muscle strength and overall function  Plan: Continue per plan of care         Precautions: depression with anxiety    Daily Treatment Diary     Manuals                                                                Exercise Diary              UBE warm-up 3'/3' 3'/3'                        PBFR (green)             LOP (mmHg) 182 176           PTP (mmHg) 91 88                                     PBFR- AROM scaption 30/15/15/15 30/15/15/15           PBFR- TB ER PTB 30/15/15/15 PTB 30/15/15/15           PBFR- Wall push-ups 30/15/15/15 30/15/15/15           PBFR- S/L ER 1# 30/15/15/15 1# 30/15/15/15                                                                                                                                                                                    Modalities             CP post prn 10 min 10 min

## 2018-12-07 ENCOUNTER — OFFICE VISIT (OUTPATIENT)
Dept: PHYSICAL THERAPY | Facility: CLINIC | Age: 48
End: 2018-12-07
Payer: COMMERCIAL

## 2018-12-07 DIAGNOSIS — M25.512 CHRONIC LEFT SHOULDER PAIN: Primary | ICD-10-CM

## 2018-12-07 DIAGNOSIS — G89.29 CHRONIC LEFT SHOULDER PAIN: Primary | ICD-10-CM

## 2018-12-07 PROCEDURE — 97110 THERAPEUTIC EXERCISES: CPT

## 2018-12-07 NOTE — PROGRESS NOTES
Daily Note     Today's date: 2018  Patient name: Maco Lux  : 1970  MRN: [id number]  Referring provider: Yoanna Hale MD  Dx:   Encounter Diagnosis     ICD-10-CM    1  Chronic left shoulder pain M25 512     G89 29                   Subjective: Patient has no pain to report in L shoulder this morning  He states earlier this week he did a shoulder workout at the gym, including overhead presses and lateral raises, and had minor muscle discomfort after, however no outright pain  Objective: See treatment diary below  Assessment: Tolerated treatment well  Increased resistance of strengthening exercises today, with good performance by patient, continuing to show proper technique  Patient reports exercises are more challenging to complete with increased resistance, however this does not cause pain or increased shoulder discomfort  Patient would benefit from continued PT to improve muscle strength and overall function  Plan: Continue per plan of care         Precautions: depression with anxiety    Daily Treatment Diary     Manuals                                                               Exercise Diary              UBE warm-up 3'/3' 3'/3' 3'/3'                       PBFR (green)             LOP (mmHg) 182 176 186          PTP (mmHg) 91 88 93                                    PBFR- AROM scaption 30/15/15/15 30/15/15/15 1#, 30/15/15/15          PBFR- TB ER PTB 30/15/15/15 PTB 30/15/15/15 PTB 30/15/15/15          PBFR- Wall push-ups 30/15/15/15 30/15/15/15 30/15/15/15          PBFR- S/L ER 1# 30/15/15/15 1# 30/15/15/15 2# 30/15/15/15                                                                                                                                                                                   Modalities             CP post prn 10 min 10 min Pt defers

## 2018-12-10 ENCOUNTER — OFFICE VISIT (OUTPATIENT)
Dept: FAMILY MEDICINE CLINIC | Facility: CLINIC | Age: 48
End: 2018-12-10
Payer: COMMERCIAL

## 2018-12-10 VITALS
DIASTOLIC BLOOD PRESSURE: 80 MMHG | HEIGHT: 71 IN | WEIGHT: 218.2 LBS | SYSTOLIC BLOOD PRESSURE: 124 MMHG | BODY MASS INDEX: 30.55 KG/M2 | TEMPERATURE: 98.6 F | HEART RATE: 76 BPM

## 2018-12-10 DIAGNOSIS — E66.9 OBESITY (BMI 30-39.9): ICD-10-CM

## 2018-12-10 DIAGNOSIS — F41.8 DEPRESSION WITH ANXIETY: ICD-10-CM

## 2018-12-10 DIAGNOSIS — N52.9 ERECTILE DYSFUNCTION, UNSPECIFIED ERECTILE DYSFUNCTION TYPE: ICD-10-CM

## 2018-12-10 DIAGNOSIS — E78.2 MIXED HYPERLIPIDEMIA: Primary | ICD-10-CM

## 2018-12-10 PROBLEM — E66.3 OVERWEIGHT (BMI 25.0-29.9): Status: RESOLVED | Noted: 2017-05-18 | Resolved: 2018-12-10

## 2018-12-10 PROCEDURE — 99214 OFFICE O/P EST MOD 30 MIN: CPT | Performed by: FAMILY MEDICINE

## 2018-12-10 PROCEDURE — 1036F TOBACCO NON-USER: CPT | Performed by: FAMILY MEDICINE

## 2018-12-10 PROCEDURE — 3008F BODY MASS INDEX DOCD: CPT | Performed by: FAMILY MEDICINE

## 2018-12-10 NOTE — PROGRESS NOTES
Chief Complaint   Patient presents with    Follow-up     4 week follow up  Health Maintenance   Topic Date Due    PT PLAN OF CARE  12/27/2018    Depression Screening PHQ  11/27/2019    DTaP,Tdap,and Td Vaccines (2 - Td) 10/29/2026    INFLUENZA VACCINE  Completed     Assessment/Plan:  Reviewed lab in 11/2018  CBC ok  CMP ok  hgA1C 5 4 normal  PSA ok  TSH normal  Lipid 291/443/55/195 elevated    Hyperlipidemia----Pt refused medications  Would like to try harder on low fat diet  Depression/anxiety---PHQ-9 score 6 today, CARLOS-7 score 8 today  Improved  Continue lexapro 10mg QD  Continue therapist weekly  ED---Stop finasteride now to see if any improvement  If not, may need to change lexapro  Pt agreed  Obesity---lose weight  Advised pt to eat smaller amount of food  Advised pt to exercise regularly 150min/week  RTO in 1 month  Diagnoses and all orders for this visit:    Mixed hyperlipidemia  -     Comprehensive metabolic panel; Future  -     Lipid panel; Future    Depression with anxiety    Erectile dysfunction, unspecified erectile dysfunction type    Obesity (BMI 30-39  9)          Subjective:      Patient ID: Nayeli Teran is a 57 y o  male  HPI  Pt is here by himself  Hyperlipidemia---recent lab showed elevated lipid panel  Pt states he did not follow low fat diet because of holidays       Depression/anxiety----for years  FU therapist weekly now  He is on lexapro 10mg QD which helped per pt  Denies suicidal ideation  Mother had depression/anxiety/?bipolar  Cousins had depression/anxiety per pt       IFG---Eat healthy per pt  11/2018 hgA1C 5 4 normal       Hair loss/male baldness---He is on finasteride for years which works OK  ED---Pt states since he starts lexapro he felt erectile dysfunction  He does not know if this is the combination of lexapro and finasteride  Would like to stop finasteride now  No smoking  Social alcohol  No drugs       The following portions of the patient's history were reviewed and updated as appropriate: allergies, current medications, past family history, past medical history, past social history, past surgical history and problem list     Review of Systems   Constitutional: Negative for appetite change, chills and fever  HENT: Negative for congestion, ear pain, sinus pain and sore throat  Eyes: Negative for discharge and itching  Respiratory: Negative for apnea, cough, chest tightness, shortness of breath and wheezing  Cardiovascular: Negative for chest pain, palpitations and leg swelling  Gastrointestinal: Negative for abdominal pain, anal bleeding, constipation, diarrhea, nausea and vomiting  Endocrine: Negative for cold intolerance, heat intolerance and polyuria  Genitourinary: Negative for difficulty urinating and dysuria  Musculoskeletal: Negative for arthralgias, back pain and myalgias  Skin: Negative for rash  Neurological: Negative for dizziness and headaches  Psychiatric/Behavioral: Negative for agitation  Objective:      /80 (BP Location: Left arm, Patient Position: Sitting, Cuff Size: Large)   Pulse 76   Temp 98 6 °F (37 °C) (Tympanic)   Ht 5' 11" (1 803 m)   Wt 99 kg (218 lb 3 2 oz)   BMI 30 43 kg/m²          Physical Exam   Constitutional: He appears well-developed  HENT:   Head: Normocephalic and atraumatic  Eyes: Pupils are equal, round, and reactive to light  Conjunctivae are normal    Neck: Normal range of motion  Neck supple  Cardiovascular: Normal rate, regular rhythm, normal heart sounds and intact distal pulses  Pulmonary/Chest: Effort normal and breath sounds normal    Abdominal: Soft  Bowel sounds are normal    Musculoskeletal: Normal range of motion  Neurological: He is alert  BMI Counseling: Body mass index is 30 43 kg/m²  Discussed the patient's BMI with him  The BMI is above average  BMI counseling and education was provided to the patient   Nutrition recommendations include reducing portion sizes, decreasing overall calorie intake and 3-5 servings of fruits/vegetables daily  Exercise recommendations include moderate aerobic physical activity for 150 minutes/week

## 2018-12-11 ENCOUNTER — OFFICE VISIT (OUTPATIENT)
Dept: PHYSICAL THERAPY | Facility: CLINIC | Age: 48
End: 2018-12-11
Payer: COMMERCIAL

## 2018-12-11 DIAGNOSIS — G89.29 CHRONIC LEFT SHOULDER PAIN: Primary | ICD-10-CM

## 2018-12-11 DIAGNOSIS — M25.512 CHRONIC LEFT SHOULDER PAIN: Primary | ICD-10-CM

## 2018-12-11 PROCEDURE — 97110 THERAPEUTIC EXERCISES: CPT

## 2018-12-11 NOTE — PROGRESS NOTES
Daily Note     Today's date: 2018  Patient name: Glen Cade  : 1970  MRN: [id number]  Referring provider: Tye Merino MD  Dx:   Encounter Diagnosis     ICD-10-CM    1  Chronic left shoulder pain M25 512     G89 29                   Subjective: Patient reported feeling an improvement overall in L shoulder, minimal discomfort following previous treatment  Objective: See treatment diary below  FOTO score improved from 75 to 85 over 5 visits  Assessment: Able to complete PBFR program with no reported increase in symptoms, progressing resistance for AROM scaption  Did adjust PTP once as patient reported cuff becoming uncomfortable, reducing roughly 20 mmHg  Plan: Continue per plan of care         Precautions: depression with anxiety    Daily Treatment Diary   Manuals                                                              Exercise Diary              UBE warm-up 3'/3' 3'/3' 3'/3' 3 min ea                      PBFR (green)             LOP (mmHg) 182 176 186 201         PTP (mmHg) 91 88 93 100                                   PBFR- AROM scaption 30/15/15/15 30/15/15/15 1#, 30/15/15/15 2#  30/15/15/15         PBFR- TB ER PTB 30/15/15/15 PTB 30/15/15/15 PTB 30/15/15/15 Green  30/15/15/15         PBFR- Wall push-ups 30/15/15/15 30/15/15/15 30/15/15/15 30/15/15/15         PBFR- S/L ER 1# 30/15/15/15 1# 30/15/15/15 2# 30/15/15/15 2#  30/15/15/15                                                                                                                                                                                  Modalities             CP post prn 10 min 10 min Pt defers 10 min post

## 2018-12-12 ENCOUNTER — TELEPHONE (OUTPATIENT)
Dept: FAMILY MEDICINE CLINIC | Facility: CLINIC | Age: 48
End: 2018-12-12

## 2018-12-12 DIAGNOSIS — F41.8 DEPRESSION WITH ANXIETY: Primary | ICD-10-CM

## 2018-12-13 RX ORDER — BUPROPION HYDROCHLORIDE 100 MG/1
100 TABLET ORAL 2 TIMES DAILY
Qty: 60 TABLET | Refills: 5 | Status: SHIPPED | OUTPATIENT
Start: 2018-12-13 | End: 2018-12-19

## 2018-12-13 NOTE — TELEPHONE ENCOUNTER
I called pt  Pt is concerned about sexual dysfunction caused by lexapro  Would like to change  I sent wellbutrin to his pharmacy

## 2018-12-14 ENCOUNTER — OFFICE VISIT (OUTPATIENT)
Dept: PHYSICAL THERAPY | Facility: CLINIC | Age: 48
End: 2018-12-14
Payer: COMMERCIAL

## 2018-12-14 DIAGNOSIS — M25.512 CHRONIC LEFT SHOULDER PAIN: Primary | ICD-10-CM

## 2018-12-14 DIAGNOSIS — G89.29 CHRONIC LEFT SHOULDER PAIN: Primary | ICD-10-CM

## 2018-12-14 PROCEDURE — 97110 THERAPEUTIC EXERCISES: CPT

## 2018-12-14 NOTE — PROGRESS NOTES
Daily Note     Today's date: 2018  Patient name: Mohit Montenegro  : 1970  MRN: [id number]  Referring provider: Cristin Gomez MD  Dx:   Encounter Diagnosis     ICD-10-CM    1  Chronic left shoulder pain M25 512     G89 29                   Subjective: Patient reports his L shoulder is feeling much better, stating he has been doing higher volume shoulder and chest workouts at the gym, having no increased shoulder discomfort during or after workouts  Objective: See treatment diary below  Assessment: Patient tolerates treatment well  He progresses through strengthening program using PBFR, with little difficulty due to muscle fatigue  Added in overhead shoulder presses, with no reported discomfort  Good performance of program today, with patient reporting perceived improvement  Plan: Continue per plan of care         Precautions: depression with anxiety    Daily Treatment Diary    Manuals                                                             Exercise Diary              UBE warm-up 3'/3' 3'/3' 3'/3' 3 min ea 3'/3'                     PBFR (green)             LOP (mmHg) 182 176 186 201 204        PTP (mmHg) 91 88 93 100 102                                  PBFR- AROM scaption 30/15/15/15 30/15/15/15 1#, 30/15/15/15 2#  30/15/15/15 3# 30/15/15/15        PBFR- TB ER PTB 30/15/15/15 PTB 30/15/15/15 PTB 30/15/15/15 Green  30/15/15/15 GTB 30/15/15/15        PBFR- Wall push-ups 30/15/15/15 30/15/15/15 30/15/15/15 30/15/15/15 30/15/15/15 BOSU         PBFR- S/L ER 1# 30/15/15/15 1# 30/15/15/15 2# 30/15/15/15 2#  30/15/15/15 3# 30/15/15/15        PBFR- Standing DB shoulder press     3# 30/15/15/15 4#                                                                                                                                                                   Modalities             CP post prn 10 min 10 min Pt defers 10 min post 5 min

## 2018-12-18 ENCOUNTER — OFFICE VISIT (OUTPATIENT)
Dept: PHYSICAL THERAPY | Facility: CLINIC | Age: 48
End: 2018-12-18
Payer: COMMERCIAL

## 2018-12-18 DIAGNOSIS — G89.29 CHRONIC LEFT SHOULDER PAIN: Primary | ICD-10-CM

## 2018-12-18 DIAGNOSIS — M25.512 CHRONIC LEFT SHOULDER PAIN: Primary | ICD-10-CM

## 2018-12-18 PROCEDURE — 97110 THERAPEUTIC EXERCISES: CPT

## 2018-12-18 NOTE — PROGRESS NOTES
Daily Note     Today's date: 2018  Patient name: Dorothy Barrios  : 1970  MRN: [id number]  Referring provider: Pallavi Blevins MD  Dx:   Encounter Diagnosis     ICD-10-CM    1  Chronic left shoulder pain M25 512     G89 29                   ADDENDUM: Patient cancelled last scheduled appointment due to illness  As of most recent appointment, patient was doing well and planned to discharge on  to HEP  He will be discharged as of today and contact clinic with any exercise related questions or concerns he may have  -AN, PT       Subjective: Patient states he was on elliptical for 50 minutes prior to coming to PT today, so he feels "everything is pretty warmed up" and wants to skip UBE today  Objective: See treatment diary below  Assessment: Patient tolerates treatment well  Patient progresses well through program using PBFR unit today, able to progress resistance of exercises with no increase in discomfort  Normal muscle soreness due to fatigue during session, which is relieved with rest       Plan: Potential discharge next visit        Precautions: depression with anxiety    Daily Treatment Diary    Manuals                                                            Exercise Diary              UBE warm-up 3'/3' 3'/3' 3'/3' 3 min ea 3'/3' NP                    PBFR (green)             LOP (mmHg) 182 176 186 201 204 176       PTP (mmHg) 91 88 93 100 102 88                                 PBFR- AROM scaption 30/15/15/15 30/15/15/15 1#, 30/15/15/15 2#  30/15/15/15 3# 30/15/15/15 4# 30/15/15/15       PBFR- TB ER PTB 30/15/15/15 PTB 30/15/15/15 PTB 30/15/15/15 Green  30/15/15/15 GTB 30/15/15/15 GTB 30/15/15/15       PBFR- Wall push-ups 30/15/15/15 30/15/15/15 30/15/15/15 30/15/15/15 30/15/15/15 BOSU  30/15/15/15       PBFR- S/L ER 1# 30/15/15/15 1# 30/15/15/15 2# 30/15/15/15 2#  30/15/15/15 3# 30/15/15/15 3# 30/15/15/15       PBFR- Standing DB shoulder press 3# 30/15/15/15 4# 30/15/15/15                                                                                                                                                                   Modalities             CP post prn 10 min 10 min Pt defers 10 min post 5 min 10 min

## 2018-12-19 ENCOUNTER — TELEPHONE (OUTPATIENT)
Dept: FAMILY MEDICINE CLINIC | Facility: CLINIC | Age: 48
End: 2018-12-19

## 2018-12-19 DIAGNOSIS — F41.8 DEPRESSION WITH ANXIETY: Primary | ICD-10-CM

## 2018-12-19 RX ORDER — BUPROPION HYDROCHLORIDE 150 MG/1
150 TABLET ORAL DAILY
Qty: 30 TABLET | Refills: 5 | Status: SHIPPED | OUTPATIENT
Start: 2018-12-19 | End: 2019-05-30

## 2018-12-21 ENCOUNTER — APPOINTMENT (OUTPATIENT)
Dept: PHYSICAL THERAPY | Facility: CLINIC | Age: 48
End: 2018-12-21
Payer: COMMERCIAL

## 2018-12-24 PROCEDURE — G8992 OTHER PT/OT  D/C STATUS: HCPCS

## 2018-12-24 PROCEDURE — G8991 OTHER PT/OT GOAL STATUS: HCPCS

## 2019-01-07 ENCOUNTER — OFFICE VISIT (OUTPATIENT)
Dept: FAMILY MEDICINE CLINIC | Facility: CLINIC | Age: 49
End: 2019-01-07
Payer: COMMERCIAL

## 2019-01-07 VITALS
DIASTOLIC BLOOD PRESSURE: 74 MMHG | RESPIRATION RATE: 16 BRPM | TEMPERATURE: 97.8 F | WEIGHT: 211.6 LBS | SYSTOLIC BLOOD PRESSURE: 118 MMHG | HEIGHT: 71 IN | BODY MASS INDEX: 29.62 KG/M2 | HEART RATE: 80 BPM

## 2019-01-07 DIAGNOSIS — B00.1 COLD SORE: Primary | ICD-10-CM

## 2019-01-07 DIAGNOSIS — R73.01 IMPAIRED FASTING GLUCOSE: ICD-10-CM

## 2019-01-07 DIAGNOSIS — F41.8 DEPRESSION WITH ANXIETY: ICD-10-CM

## 2019-01-07 DIAGNOSIS — E78.2 MIXED HYPERLIPIDEMIA: ICD-10-CM

## 2019-01-07 PROCEDURE — 99214 OFFICE O/P EST MOD 30 MIN: CPT | Performed by: FAMILY MEDICINE

## 2019-01-07 PROCEDURE — 1036F TOBACCO NON-USER: CPT | Performed by: FAMILY MEDICINE

## 2019-01-07 PROCEDURE — 3008F BODY MASS INDEX DOCD: CPT | Performed by: FAMILY MEDICINE

## 2019-01-07 RX ORDER — ACYCLOVIR 50 MG/G
OINTMENT TOPICAL
Qty: 15 G | Refills: 0 | Status: SHIPPED | OUTPATIENT
Start: 2019-01-07 | End: 2020-01-23 | Stop reason: SDUPTHER

## 2019-01-07 NOTE — PROGRESS NOTES
Chief Complaint   Patient presents with    Follow-up     1 month follow up  Health Maintenance   Topic Date Due    Depression Screening PHQ  12/10/2019    DTaP,Tdap,and Td Vaccines (2 - Td) 10/29/2026    INFLUENZA VACCINE  Completed     Assessment/Plan:    Depression/anxiety---PHQ-9 score 0 today, CARLOS-7 score 2 today  Improved  Continue wellbutrin   Continue therapist weekly  RTO in 4 months with labs  {Assess/PlanSmartLinks:78458}      Subjective:      Patient ID: Elizabeth Zapata is a 57 y o  male      HPI              {Common ambulatory SmartLinks:70014}    Review of Systems      Objective:      /74 (BP Location: Left arm, Patient Position: Sitting, Cuff Size: Standard)   Pulse 80   Temp 97 8 °F (36 6 °C) (Oral)   Resp 16   Ht 5' 11" (1 803 m)   Wt 96 kg (211 lb 9 6 oz)   BMI 29 51 kg/m²          Physical Exam

## 2019-01-07 NOTE — PROGRESS NOTES
Assessment/Plan:    Depression/anxiety---PHQ-9 score 0 today, CARLOS-7 score 2 today  Improved  Continue wellbutrin  Continue therapist weekly  RTO in 4 months with labs  Diagnoses and all orders for this visit:    Cold sore  -     acyclovir (ZOVIRAX) 5 % ointment; Apply topically every 4 (four) hours    Depression with anxiety    Impaired fasting glucose  -     Hemoglobin A1C; Future    Mixed hyperlipidemia  -     Comprehensive metabolic panel; Future  -     Lipid Panel with Direct LDL reflex; Future          Subjective:      Patient ID: Dorothy Barrios is a 57 y o  male  HPI    Pt is here by himself  Pt states his symptoms are much better after wellbutrin XL 150mg QD  ED symptoms improved also  Denies depression  Cold sore----Pt states he found a spot on his upper lip for 1 month  He always feels dry lip  He took valtrex tabs, helped some  The following portions of the patient's history were reviewed and updated as appropriate: allergies, current medications, past family history, past medical history, past social history, past surgical history and problem list     Review of Systems   Constitutional: Negative for appetite change, chills and fever  HENT: Positive for mouth sores  Negative for congestion, ear pain, sinus pain and sore throat  Eyes: Negative for discharge and itching  Respiratory: Negative for apnea, cough, chest tightness, shortness of breath and wheezing  Cardiovascular: Negative for chest pain, palpitations and leg swelling  Gastrointestinal: Negative for abdominal pain, anal bleeding, constipation, diarrhea, nausea and vomiting  Endocrine: Negative for cold intolerance, heat intolerance and polyuria  Genitourinary: Negative for difficulty urinating and dysuria  Musculoskeletal: Negative for arthralgias, back pain and myalgias  Skin: Negative for rash  Neurological: Negative for dizziness and headaches     Psychiatric/Behavioral: Negative for agitation  Objective:      /74 (BP Location: Left arm, Patient Position: Sitting, Cuff Size: Standard)   Pulse 80   Temp 97 8 °F (36 6 °C) (Oral)   Resp 16   Ht 5' 11" (1 803 m)   Wt 96 kg (211 lb 9 6 oz)   BMI 29 51 kg/m²          Physical Exam   Constitutional: He appears well-developed  HENT:   Head: Normocephalic and atraumatic  Right Ear: External ear normal    Left Ear: External ear normal    Mouth/Throat: Oropharynx is clear and moist    I did not see any sore on his lip   Eyes: Pupils are equal, round, and reactive to light  Conjunctivae are normal    Neck: Normal range of motion  Neck supple  Cardiovascular: Normal rate, regular rhythm, normal heart sounds and intact distal pulses  Pulmonary/Chest: Effort normal and breath sounds normal    Abdominal: Soft  Bowel sounds are normal    Musculoskeletal: Normal range of motion  Neurological: He is alert

## 2019-01-08 DIAGNOSIS — L65.9 HAIR LOSS: ICD-10-CM

## 2019-01-08 RX ORDER — FINASTERIDE 1 MG/1
TABLET, FILM COATED ORAL
Qty: 90 TABLET | Refills: 1 | OUTPATIENT
Start: 2019-01-08

## 2019-02-26 ENCOUNTER — TELEPHONE (OUTPATIENT)
Dept: FAMILY MEDICINE CLINIC | Facility: CLINIC | Age: 49
End: 2019-02-26

## 2019-02-28 DIAGNOSIS — F41.8 DEPRESSION WITH ANXIETY: Primary | ICD-10-CM

## 2019-02-28 RX ORDER — BUPROPION HYDROCHLORIDE 75 MG/1
75 TABLET ORAL DAILY
Qty: 30 TABLET | Refills: 5 | Status: SHIPPED | OUTPATIENT
Start: 2019-02-28 | End: 2019-05-30

## 2019-02-28 NOTE — TELEPHONE ENCOUNTER
Called pt  Pt states he felt much better on wellbutrin 150mg QD, but still have sometime anxiety/irritation  Would like to try a little higher dose  He still follows therapist regularly  I will add bupropion 75mg QD  Pt agreed

## 2019-05-20 LAB — HBA1C MFR BLD HPLC: 5.7 %

## 2019-05-30 ENCOUNTER — OFFICE VISIT (OUTPATIENT)
Dept: FAMILY MEDICINE CLINIC | Facility: CLINIC | Age: 49
End: 2019-05-30
Payer: COMMERCIAL

## 2019-05-30 VITALS
RESPIRATION RATE: 16 BRPM | TEMPERATURE: 97.6 F | BODY MASS INDEX: 29.37 KG/M2 | SYSTOLIC BLOOD PRESSURE: 120 MMHG | WEIGHT: 209.8 LBS | HEIGHT: 71 IN | OXYGEN SATURATION: 97 % | HEART RATE: 84 BPM | DIASTOLIC BLOOD PRESSURE: 82 MMHG

## 2019-05-30 DIAGNOSIS — R73.01 IMPAIRED FASTING GLUCOSE: ICD-10-CM

## 2019-05-30 DIAGNOSIS — E66.3 OVERWEIGHT (BMI 25.0-29.9): ICD-10-CM

## 2019-05-30 DIAGNOSIS — F41.8 DEPRESSION WITH ANXIETY: ICD-10-CM

## 2019-05-30 DIAGNOSIS — E78.2 MIXED HYPERLIPIDEMIA: Primary | ICD-10-CM

## 2019-05-30 PROBLEM — E66.9 OBESITY (BMI 30-39.9): Status: RESOLVED | Noted: 2018-12-10 | Resolved: 2019-05-30

## 2019-05-30 PROCEDURE — 1036F TOBACCO NON-USER: CPT | Performed by: FAMILY MEDICINE

## 2019-05-30 PROCEDURE — 3008F BODY MASS INDEX DOCD: CPT | Performed by: FAMILY MEDICINE

## 2019-05-30 PROCEDURE — 99214 OFFICE O/P EST MOD 30 MIN: CPT | Performed by: FAMILY MEDICINE

## 2019-05-30 RX ORDER — BUPROPION HYDROCHLORIDE 100 MG/1
100 TABLET, EXTENDED RELEASE ORAL 2 TIMES DAILY
COMMUNITY
End: 2019-05-30

## 2019-05-30 RX ORDER — BUPROPION HYDROCHLORIDE 300 MG/1
300 TABLET ORAL DAILY
Qty: 90 TABLET | Refills: 3 | Status: SHIPPED | OUTPATIENT
Start: 2019-05-30 | End: 2020-01-23 | Stop reason: SDUPTHER

## 2019-06-05 ENCOUNTER — TELEPHONE (OUTPATIENT)
Dept: FAMILY MEDICINE CLINIC | Facility: CLINIC | Age: 49
End: 2019-06-05

## 2019-06-05 DIAGNOSIS — F41.8 DEPRESSION WITH ANXIETY: ICD-10-CM

## 2019-06-05 RX ORDER — BUPROPION HYDROCHLORIDE 150 MG/1
TABLET ORAL
Qty: 30 TABLET | Refills: 5 | OUTPATIENT
Start: 2019-06-05

## 2019-09-18 ENCOUNTER — OFFICE VISIT (OUTPATIENT)
Dept: URGENT CARE | Facility: MEDICAL CENTER | Age: 49
End: 2019-09-18
Payer: COMMERCIAL

## 2019-09-18 VITALS
HEART RATE: 74 BPM | OXYGEN SATURATION: 98 % | TEMPERATURE: 98 F | HEIGHT: 71 IN | SYSTOLIC BLOOD PRESSURE: 123 MMHG | BODY MASS INDEX: 28.7 KG/M2 | RESPIRATION RATE: 18 BRPM | DIASTOLIC BLOOD PRESSURE: 73 MMHG | WEIGHT: 205 LBS

## 2019-09-18 DIAGNOSIS — T14.8XXA PUNCTURE WOUND: ICD-10-CM

## 2019-09-18 DIAGNOSIS — M70.22 OLECRANON BURSITIS OF LEFT ELBOW: Primary | ICD-10-CM

## 2019-09-18 PROCEDURE — 99213 OFFICE O/P EST LOW 20 MIN: CPT | Performed by: PHYSICIAN ASSISTANT

## 2019-09-18 PROCEDURE — 87070 CULTURE OTHR SPECIMN AEROBIC: CPT | Performed by: PHYSICIAN ASSISTANT

## 2019-09-18 PROCEDURE — 87205 SMEAR GRAM STAIN: CPT | Performed by: PHYSICIAN ASSISTANT

## 2019-09-18 NOTE — PROGRESS NOTES
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  male  : 1970    MRN: [id number]  DATE: 2019  TIME: 2:24 PM    Assessment and Plan   Olecranon bursitis of left elbow [M70 22]  1  Olecranon bursitis of left elbow     2  Puncture wound  Wound culture and Gram stain   Culture was obtained of the drainage  Advised patient he may call in 2 days for results  Advised him to follow up with Orthopedics as soon as possible for further treatment of his elbow  Patient Instructions     Follow up with PCP in 3-5 days  Proceed to  ER if symptoms worsen  Chief Complaint     Chief Complaint   Patient presents with    Laceration     Patient relates he was carrying his dog on Friday at home and hit left elbow on step  C/O redness, drainage, and swelling  Denies fever and chills  Using bacitracin  History of Present Illness       29-year-old male presents for left elbow injury  He states he has a history of bursitis in the left elbow  He accidentally fell down the steps while wearing socks on Friday  He states he primarily went down on his right elbow  He states he punctured his elbow and began draining clear yellow fluid  He states the bursitis has gotten smaller due to the drainage but he is concerned because the skin is still open and draining  He denies any fever, chills, sweats, redness, warmth  Review of Systems   Review of Systems   Constitutional: Negative  Musculoskeletal: Positive for joint swelling           Current Medications       Current Outpatient Medications:     acyclovir (ZOVIRAX) 5 % ointment, Apply topically every 4 (four) hours, Disp: 15 g, Rfl: 0    Cholecalciferol (VITAMIN D) 2000 units CAPS, Take by mouth, Disp: , Rfl:     glucosamine-chondroitin 500-400 MG tablet, Take 1 tablet by mouth every morning, Disp: , Rfl:     Multiple Vitamin (MULTIVITAMIN) tablet, Take 1 tablet by mouth daily, Disp: , Rfl:     Omega-3 Fatty Acids (FISH OIL) 1,000 mg, Take 1,000 mg by mouth daily, Disp: , Rfl:     buPROPion (WELLBUTRIN XL) 300 mg 24 hr tablet, Take 1 tablet (300 mg total) by mouth daily for 90 days, Disp: 90 tablet, Rfl: 3    valACYclovir (VALTREX) 1,000 mg tablet, Take 2 tablets (2,000 mg total) by mouth 2 (two) times a day for 2 days, Disp: 8 tablet, Rfl: 0    Current Allergies     Allergies as of 09/18/2019    (No Known Allergies)            The following portions of the patient's history were reviewed and updated as appropriate: allergies, current medications, past family history, past medical history, past social history, past surgical history and problem list     Objective   /73   Pulse 74   Temp 98 °F (36 7 °C) (Tympanic)   Resp 18   Ht 5' 11" (1 803 m)   Wt 93 kg (205 lb)   SpO2 98%   BMI 28 59 kg/m²        Physical Exam     Physical Exam   Constitutional: He appears well-developed and well-nourished  No distress  Musculoskeletal:        Left elbow: He exhibits swelling, deformity and laceration  He exhibits normal range of motion  No tenderness found  No radial head, no medial epicondyle, no lateral epicondyle and no olecranon process tenderness noted  Arms:  Nursing note and vitals reviewed

## 2019-09-18 NOTE — PATIENT INSTRUCTIONS
Culture was obtained of the drainage  Advised patient he may call in 2 days for results  Advised him to follow up with Orthopedics as soon as possible for further treatment of his elbow  Elbow Bursitis   WHAT YOU NEED TO KNOW:   Elbow bursitis is inflammation of the bursa in your elbow  The bursa is a fluid-filled sac that acts as a cushion between a bone and a tendon  A tendon is a cord of strong tissue that connects muscles to bones  The bursa is located right under the point of your elbow  DISCHARGE INSTRUCTIONS:   Medicines:   · NSAIDs:  These medicines decrease swelling, pain, and fever  NSAIDs are available without a doctor's order  Ask your healthcare provider which medicine is right for you  Ask how much to take and when to take it  Take as directed  NSAIDs can cause stomach bleeding and kidney problems if not taken correctly  · Antibiotics: These help fight an infection caused by bacteria  You may need antibiotics if your bursitis is caused by infection  · Take your medicine as directed  Contact your healthcare provider if you think your medicine is not helping or if you have side effects  Tell him of her if you are allergic to any medicine  Keep a list of the medicines, vitamins, and herbs you take  Include the amounts, and when and why you take them  Bring the list or the pill bottles to follow-up visits  Carry your medicine list with you in case of an emergency  Manage your symptoms:   · Rest:  Rest your elbow as much as possible to decrease pain and swelling  Slowly start to do more each day  Return to your daily activities as directed  · Ice:  Ice helps decrease swelling and pain  Ice may also help prevent tissue damage  Use an ice pack, or put crushed ice in a plastic bag  Cover it with a towel and place it on your elbow for 15 to 20 minutes, 3 to 4 times each day, as directed      · Compress:  Healthcare providers may wrap your arm with tape or an elastic bandage to decrease swelling  Loosen the elastic bandage if you start to lose feeling in your fingers  · Elevate:  Raise your elbow above the level of your heart as often as you can  This will help decrease swelling and pain  Prop your elbow on pillows or blankets to keep it elevated comfortably  Physical therapy:  A physical therapist teaches you exercises to help improve movement and strength, and to decrease pain  Prevent another elbow injury:   · Avoid injury and pressure to your elbows: Wear elbow pads or protectors when you play sports  Do not lean on your elbows or clench your fists  Do not tightly  small items, such as tools or pens  · Stretch, warm up, and cool down:  Always stretch and do warmup and cool-down exercises before and after you exercise  This will help loosen your muscles and decrease stress on your elbow  Rest between workouts  Follow up with your healthcare provider as directed:  Write down your questions so you remember to ask them during your visits  Contact your healthcare provider if:   · Your pain and swelling increase  · Your symptoms do not improve after 10 days of treatment  · You have a fever  · You have questions or concerns about your condition or care  © 2017 [employer] Information is for End User's use only and may not be sold, redistributed or otherwise used for commercial purposes  All illustrations and images included in CareNotes® are the copyrighted property of [employer] , [address]  or Jesus Quintero  The above information is an  only  It is not intended as medical advice for individual conditions or treatments  Talk to your doctor, nurse or pharmacist before following any medical regimen to see if it is safe and effective for you

## 2019-09-20 LAB
BACTERIA WND AEROBE CULT: NORMAL
GRAM STN SPEC: NORMAL
GRAM STN SPEC: NORMAL

## 2019-09-23 ENCOUNTER — TELEPHONE (OUTPATIENT)
Dept: FAMILY MEDICINE CLINIC | Facility: CLINIC | Age: 49
End: 2019-09-23

## 2019-09-23 DIAGNOSIS — R73.01 IMPAIRED FASTING GLUCOSE: Primary | ICD-10-CM

## 2019-09-23 DIAGNOSIS — N52.9 ERECTILE DYSFUNCTION, UNSPECIFIED ERECTILE DYSFUNCTION TYPE: ICD-10-CM

## 2019-09-23 DIAGNOSIS — E78.2 MIXED HYPERLIPIDEMIA: ICD-10-CM

## 2019-09-23 NOTE — TELEPHONE ENCOUNTER
Patient called he would like to have routine bloodwork done including a testosterone level he would not tell me why and stated you can call him if you have any questions

## 2019-09-25 ENCOUNTER — OFFICE VISIT (OUTPATIENT)
Dept: OBGYN CLINIC | Facility: MEDICAL CENTER | Age: 49
End: 2019-09-25
Payer: COMMERCIAL

## 2019-09-25 VITALS
WEIGHT: 205 LBS | SYSTOLIC BLOOD PRESSURE: 124 MMHG | HEART RATE: 87 BPM | DIASTOLIC BLOOD PRESSURE: 81 MMHG | BODY MASS INDEX: 28.7 KG/M2 | HEIGHT: 71 IN

## 2019-09-25 DIAGNOSIS — M70.22 OLECRANON BURSITIS OF LEFT ELBOW: Primary | ICD-10-CM

## 2019-09-25 PROCEDURE — 99213 OFFICE O/P EST LOW 20 MIN: CPT | Performed by: ORTHOPAEDIC SURGERY

## 2019-09-25 NOTE — PATIENT INSTRUCTIONS
I am concerned by the persistent drainage from this elbow bursa  It is not large now but this will get infected, if not already infected  I would like him to continue with daily cleansing of the area with soap and water and then applying a dressing as he is already doing  He should keep it covered and dry to prevent any infection from the skin skin going deep into the bursa as it makes a difference in his prognosis  He needs definitive excision of the bursa and I explained this operation to the patient  The major potential risks of the surgery include persistence, recurrence, or infection and these are definite risks  I will use prophylactic IV antibiotics and then oral antibiotics for a short time until the OR cultures come back  I will splint him for 7-10 days to allow the soft tissues to heal   He will not be able to drive or do any heavy lifting with this dominant left hand until the bursa quiets  He already purchased a spider elbow pad that I want him to wear once the skin heals to protect this from irritation as the new bursa grows    I will schedule this outpatient surgery to be done at [facility] on Thursday 10/03/2019

## 2019-09-25 NOTE — PROGRESS NOTES
Chief Complaint   Patient presents with    Left Elbow - Pain           Assessment:  Left olecranon bursitis with wound drainage    Plan :  I am concerned by the persistent drainage from this elbow bursa  It is not large now but this will get infected, if not already infected  I would like him to continue with daily cleansing of the area with soap and water and then applying a dressing as he is already doing  He should keep it covered and dry to prevent any infection from the skin skin going deep into the bursa as it makes a difference in his prognosis  He needs definitive excision of the bursa and I explained this operation to the patient  The major potential risks of the surgery include persistence, recurrence, or infection and these are definite risks  I will use prophylactic IV antibiotics and then oral antibiotics for a short time until the OR cultures come back  I will splint him for 7-10 days to allow the soft tissues to heal   He will not be able to drive or do any heavy lifting with this dominant left hand until the bursa quiets  He already purchased a spider elbow pad that I want him to wear once the skin heals to protect this from irritation as the new bursa grows  I will schedule this outpatient surgery to [place] [facility] [place]03/[place]    HPI:   This is a 53-year-old male presenting today for orthopedic evaluation regarding his left elbow  He is left-hand dominant  He states that 6 months ago he was evaluated at [facility] and diagnosed with left olecranon bursitis  He was placed on antibiotics and had no follow ups  On 9/13/19 he was carrying his dog and slipped on a step  He hit his elbow on the edge of the step which created a wound which was draining  He presented to one of our urgent care facilities 5 days later where they did a culture of the drainage and he was referred to our practice    He states that this is not necessarily bothersome see his elbow directly  He does keep this covered as it is still draining  He denies any redness, warmth, fever or chills      PMHx:         Past Medical History:   Diagnosis Date    Shoulder arthritis     left       Past Surgical History:   Procedure Laterality Date    ARTHROSCOPIC BICEPS TENODESIS Left 2018    Procedure: SHOULDER ARTHROSCOPY WITH BICEPS TENODESIS;  Surgeon: Michael Cuba MD;  Location: [facility];  Service: Orthopedics    ROTATOR CUFF REPAIR      ROTATOR CUFF REPAIR Left     x2 last one     SEPTOPLASTY         Family History   Problem Relation Age of Onset    No Known Problems Mother     No Known Problems Father        Social History     Socioeconomic History    Marital status: Single     Spouse name: Not on file    Number of children: Not on file    Years of education: Not on file    Highest education level: Not on file   Occupational History    Not on file   Social Needs    Financial resource strain: Not on file    Food insecurity:     Worry: Not on file     Inability: Not on file    Transportation needs:     Medical: Not on file     Non-medical: Not on file   Tobacco Use    Smoking status: Former Smoker     Years: 15 00     Last attempt to quit: 2007     Years since quittin 6    Smokeless tobacco: Never Used   Substance and Sexual Activity    Alcohol use: Yes     Comment: socially    Drug use: No    Sexual activity: Not on file   Lifestyle    Physical activity:     Days per week: Not on file     Minutes per session: Not on file    Stress: Not on file   Relationships    Social connections:     Talks on phone: Not on file     Gets together: Not on file     Attends Gnosticism service: Not on file     Active member of club or organization: Not on file     Attends meetings of clubs or organizations: Not on file     Relationship status: Not on file    Intimate partner violence:     Fear of current or ex partner: Not on file     Emotionally abused: Not on file Physically abused: Not on file     Forced sexual activity: Not on file   Other Topics Concern    Not on file   Social History Narrative    Not on file       Current Outpatient Medications   Medication Sig Dispense Refill    acyclovir (ZOVIRAX) 5 % ointment Apply topically every 4 (four) hours 15 g 0    Cholecalciferol (VITAMIN D) 2000 units CAPS Take by mouth      glucosamine-chondroitin 500-400 MG tablet Take 1 tablet by mouth every morning      Multiple Vitamin (MULTIVITAMIN) tablet Take 1 tablet by mouth daily      Omega-3 Fatty Acids (FISH OIL) 1,000 mg Take 1,000 mg by mouth daily      buPROPion (WELLBUTRIN XL) 300 mg 24 hr tablet Take 1 tablet (300 mg total) by mouth daily for 90 days 90 tablet 3    valACYclovir (VALTREX) 1,000 mg tablet Take 2 tablets (2,000 mg total) by mouth 2 (two) times a day for 2 days 8 tablet 0     No current facility-administered medications for this visit  Allergies: Patient has no known allergies  ROS:  Positive for orthopedic complaints noted above  The remaining 11/12 systems on the intake sheet that I reviewed were negative  PE:  /81   Pulse 87   Ht 5' 11" (1 803 m)   Wt 93 kg (205 lb)   BMI 28 59 kg/m²   Constitutional: The patient was  oriented to person, place, and time  Well-developed and well-nourished  In no acute distress  HEENT: Vision intact  Hearing normal  Swallowing normal   Head: Normocephalic  Neck is supple with good extension  Cardiovascular: Intact distal pulses  Pulse regular  Heart regular rate and rhythm, no murmurs heard  Pulmonary/Chest: Effort normal  No respiratory distress  Lungs clear to P and A, no rales or rhonchi  Neurological: Alert and oriented to person, place, and time  Skin: Skin is warm  Psychiatric: Normal mood and affect  Ortho Exam:  On today's exam of the left elbow, the bursa did not appear to be inflamed    There was a small laceration over the olecranon, this was draining a light yellow colored fluid   There was no warmth, redness or other signs of infection  He can fully extend the elbow and flex to 130°  The elbow is stable to varus and valgus stress  He had 5/5 biceps and triceps strength  Sensation was intact to light touch brisk capillary refill in all fingers  2+ distal radial pulse  There is no antecubital adenopathy or cellulitis noted      Studies reviewed:  None to review    Scribe Attestation    I,:   Lu Gresham MA am acting as a scribe while in the presence of the attending physician :        I,:   Arik Garcia MD personally performed the services described in this documentation    as scribed in my presence :

## 2019-09-25 NOTE — H&P (VIEW-ONLY)
Chief Complaint   Patient presents with    Left Elbow - Pain           Assessment:  Left olecranon bursitis with wound drainage    Plan :  I am concerned by the persistent drainage from this elbow bursa  It is not large now but this will get infected, if not already infected  I would like him to continue with daily cleansing of the area with soap and water and then applying a dressing as he is already doing  He should keep it covered and dry to prevent any infection from the skin skin going deep into the bursa as it makes a difference in his prognosis  He needs definitive excision of the bursa and I explained this operation to the patient  The major potential risks of the surgery include persistence, recurrence, or infection and these are definite risks  I will use prophylactic IV antibiotics and then oral antibiotics for a short time until the OR cultures come back  I will splint him for 7-10 days to allow the soft tissues to heal   He will not be able to drive or do any heavy lifting with this dominant left hand until the bursa quiets  He already purchased a spider elbow pad that I want him to wear once the skin heals to protect this from irritation as the new bursa grows  I will schedule this outpatient surgery to be done at [facility] on Thursday 10/03/2019    HPI:   This is a 82-year-old male presenting today for orthopedic evaluation regarding his left elbow  He is left-hand dominant  He states that 6 months ago he was evaluated at [facility] and diagnosed with left olecranon bursitis  He was placed on antibiotics and had no follow ups  On 9/13/19 he was carrying his dog and slipped on a step  He hit his elbow on the edge of the step which created a wound which was draining  He presented to one of our urgent care facilities 5 days later where they did a culture of the drainage and he was referred to our practice    He states that this is not necessarily bothersome see his elbow directly  He does keep this covered as it is still draining  He denies any redness, warmth, fever or chills      PMHx:         Past Medical History:   Diagnosis Date    Shoulder arthritis     left       Past Surgical History:   Procedure Laterality Date    ARTHROSCOPIC BICEPS TENODESIS Left 2018    Procedure: SHOULDER ARTHROSCOPY WITH BICEPS TENODESIS;  Surgeon: Iva Desai MD;  Location: [facility];  Service: Orthopedics    ROTATOR CUFF REPAIR      ROTATOR CUFF REPAIR Left     x2 last one     SEPTOPLASTY         Family History   Problem Relation Age of Onset    No Known Problems Mother     No Known Problems Father        Social History     Socioeconomic History    Marital status: Single     Spouse name: Not on file    Number of children: Not on file    Years of education: Not on file    Highest education level: Not on file   Occupational History    Not on file   Social Needs    Financial resource strain: Not on file    Food insecurity:     Worry: Not on file     Inability: Not on file    Transportation needs:     Medical: Not on file     Non-medical: Not on file   Tobacco Use    Smoking status: Former Smoker     Years: 15 00     Last attempt to quit: 2007     Years since quittin 6    Smokeless tobacco: Never Used   Substance and Sexual Activity    Alcohol use: Yes     Comment: socially    Drug use: No    Sexual activity: Not on file   Lifestyle    Physical activity:     Days per week: Not on file     Minutes per session: Not on file    Stress: Not on file   Relationships    Social connections:     Talks on phone: Not on file     Gets together: Not on file     Attends Confucianist service: Not on file     Active member of club or organization: Not on file     Attends meetings of clubs or organizations: Not on file     Relationship status: Not on file    Intimate partner violence:     Fear of current or ex partner: Not on file     Emotionally abused: Not on file Physically abused: Not on file     Forced sexual activity: Not on file   Other Topics Concern    Not on file   Social History Narrative    Not on file       Current Outpatient Medications   Medication Sig Dispense Refill    acyclovir (ZOVIRAX) 5 % ointment Apply topically every 4 (four) hours 15 g 0    Cholecalciferol (VITAMIN D) 2000 units CAPS Take by mouth      glucosamine-chondroitin 500-400 MG tablet Take 1 tablet by mouth every morning      Multiple Vitamin (MULTIVITAMIN) tablet Take 1 tablet by mouth daily      Omega-3 Fatty Acids (FISH OIL) 1,000 mg Take 1,000 mg by mouth daily      buPROPion (WELLBUTRIN XL) 300 mg 24 hr tablet Take 1 tablet (300 mg total) by mouth daily for 90 days 90 tablet 3    valACYclovir (VALTREX) 1,000 mg tablet Take 2 tablets (2,000 mg total) by mouth 2 (two) times a day for 2 days 8 tablet 0     No current facility-administered medications for this visit  Allergies: Patient has no known allergies  ROS:  Positive for orthopedic complaints noted above  The remaining 11/12 systems on the intake sheet that I reviewed were negative  PE:  /81   Pulse 87   Ht 5' 11" (1 803 m)   Wt 93 kg (205 lb)   BMI 28 59 kg/m²   Constitutional: The patient was  oriented to person, place, and time  Well-developed and well-nourished  In no acute distress  HEENT: Vision intact  Hearing normal  Swallowing normal   Head: Normocephalic  Neck is supple with good extension  Cardiovascular: Intact distal pulses  Pulse regular  Heart regular rate and rhythm, no murmurs heard  Pulmonary/Chest: Effort normal  No respiratory distress  Lungs clear to P and A, no rales or rhonchi  Neurological: Alert and oriented to person, place, and time  Skin: Skin is warm  Psychiatric: Normal mood and affect  Ortho Exam:  On today's exam of the left elbow, the bursa did not appear to be inflamed    There was a small laceration over the olecranon, this was draining a light yellow colored fluid   There was no warmth, redness or other signs of infection  He can fully extend the elbow and flex to 130°  The elbow is stable to varus and valgus stress  He had 5/5 biceps and triceps strength  Sensation was intact to light touch brisk capillary refill in all fingers  2+ distal radial pulse  There is no antecubital adenopathy or cellulitis noted      Studies reviewed:  None to review    Scribe Attestation    I,:   Nanda Rodriguez MA am acting as a scribe while in the presence of the attending physician :        I,:   Chato Shelton MD personally performed the services described in this documentation    as scribed in my presence :

## 2019-09-26 ENCOUNTER — LAB (OUTPATIENT)
Dept: LAB | Facility: MEDICAL CENTER | Age: 49
End: 2019-09-26
Payer: COMMERCIAL

## 2019-09-26 DIAGNOSIS — N52.9 ERECTILE DYSFUNCTION, UNSPECIFIED ERECTILE DYSFUNCTION TYPE: ICD-10-CM

## 2019-09-26 DIAGNOSIS — E78.2 MIXED HYPERLIPIDEMIA: ICD-10-CM

## 2019-09-26 DIAGNOSIS — R73.01 IMPAIRED FASTING GLUCOSE: ICD-10-CM

## 2019-09-26 LAB
ALBUMIN SERPL BCP-MCNC: 4.3 G/DL (ref 3.5–5)
ALP SERPL-CCNC: 55 U/L (ref 46–116)
ALT SERPL W P-5'-P-CCNC: 41 U/L (ref 12–78)
ANION GAP SERPL CALCULATED.3IONS-SCNC: 5 MMOL/L (ref 4–13)
AST SERPL W P-5'-P-CCNC: 21 U/L (ref 5–45)
BASOPHILS # BLD AUTO: 0.04 THOUSANDS/ΜL (ref 0–0.1)
BASOPHILS NFR BLD AUTO: 1 % (ref 0–1)
BILIRUB SERPL-MCNC: 0.36 MG/DL (ref 0.2–1)
BUN SERPL-MCNC: 13 MG/DL (ref 5–25)
CALCIUM SERPL-MCNC: 9.7 MG/DL (ref 8.3–10.1)
CHLORIDE SERPL-SCNC: 104 MMOL/L (ref 100–108)
CHOLEST SERPL-MCNC: 256 MG/DL (ref 50–200)
CO2 SERPL-SCNC: 27 MMOL/L (ref 21–32)
CREAT SERPL-MCNC: 1.05 MG/DL (ref 0.6–1.3)
EOSINOPHIL # BLD AUTO: 0.13 THOUSAND/ΜL (ref 0–0.61)
EOSINOPHIL NFR BLD AUTO: 3 % (ref 0–6)
ERYTHROCYTE [DISTWIDTH] IN BLOOD BY AUTOMATED COUNT: 12.2 % (ref 11.6–15.1)
EST. AVERAGE GLUCOSE BLD GHB EST-MCNC: 103 MG/DL
GFR SERPL CREATININE-BSD FRML MDRD: 85 ML/MIN/1.73SQ M
GLUCOSE P FAST SERPL-MCNC: 111 MG/DL (ref 65–99)
HBA1C MFR BLD: 5.2 % (ref 4.2–6.3)
HCT VFR BLD AUTO: 47.5 % (ref 36.5–49.3)
HDLC SERPL-MCNC: 52 MG/DL (ref 40–60)
HGB BLD-MCNC: 15.4 G/DL (ref 12–17)
IMM GRANULOCYTES # BLD AUTO: 0.02 THOUSAND/UL (ref 0–0.2)
IMM GRANULOCYTES NFR BLD AUTO: 0 % (ref 0–2)
LDLC SERPL CALC-MCNC: 165 MG/DL (ref 0–100)
LYMPHOCYTES # BLD AUTO: 1.71 THOUSANDS/ΜL (ref 0.6–4.47)
LYMPHOCYTES NFR BLD AUTO: 32 % (ref 14–44)
MCH RBC QN AUTO: 30.8 PG (ref 26.8–34.3)
MCHC RBC AUTO-ENTMCNC: 32.4 G/DL (ref 31.4–37.4)
MCV RBC AUTO: 95 FL (ref 82–98)
MONOCYTES # BLD AUTO: 0.58 THOUSAND/ΜL (ref 0.17–1.22)
MONOCYTES NFR BLD AUTO: 11 % (ref 4–12)
NEUTROPHILS # BLD AUTO: 2.79 THOUSANDS/ΜL (ref 1.85–7.62)
NEUTS SEG NFR BLD AUTO: 53 % (ref 43–75)
NRBC BLD AUTO-RTO: 0 /100 WBCS
PLATELET # BLD AUTO: 324 THOUSANDS/UL (ref 149–390)
PMV BLD AUTO: 9.4 FL (ref 8.9–12.7)
POTASSIUM SERPL-SCNC: 4.8 MMOL/L (ref 3.5–5.3)
PROT SERPL-MCNC: 8 G/DL (ref 6.4–8.2)
RBC # BLD AUTO: 5 MILLION/UL (ref 3.88–5.62)
SODIUM SERPL-SCNC: 136 MMOL/L (ref 136–145)
TRIGL SERPL-MCNC: 193 MG/DL
WBC # BLD AUTO: 5.27 THOUSAND/UL (ref 4.31–10.16)

## 2019-09-26 PROCEDURE — 84402 ASSAY OF FREE TESTOSTERONE: CPT

## 2019-09-26 PROCEDURE — 80053 COMPREHEN METABOLIC PANEL: CPT

## 2019-09-26 PROCEDURE — 85025 COMPLETE CBC W/AUTO DIFF WBC: CPT

## 2019-09-26 PROCEDURE — 36415 COLL VENOUS BLD VENIPUNCTURE: CPT

## 2019-09-26 PROCEDURE — 80061 LIPID PANEL: CPT

## 2019-09-26 PROCEDURE — 84403 ASSAY OF TOTAL TESTOSTERONE: CPT

## 2019-09-26 PROCEDURE — 83036 HEMOGLOBIN GLYCOSYLATED A1C: CPT

## 2019-09-27 LAB
TESTOST FREE SERPL-MCNC: 13.4 PG/ML (ref 6.8–21.5)
TESTOST SERPL-MCNC: 278 NG/DL (ref 264–916)

## 2019-10-01 NOTE — PRE-PROCEDURE INSTRUCTIONS
Pre-Surgery Instructions:   Medication Instructions    buPROPion (WELLBUTRIN XL) 300 mg 24 hr tablet Instructed patient per Anesthesia Guidelines  Pre-op Showering Instructions for Surgery Patients    Before your operation, you play an important role in decreasing your risk for infection by washing with special antiseptic soap  This is an effective way to reduce bacteria on the skin which may help to prevent infections at the surgical site  Please read the following directions in advance  1  In the week before your operation, purchase a 4 ounce bottle of antiseptic soap containing chlorhexidine gluconate (CHG)  4%  Some brand names include: Aplicare®, Endure, and Hibiclens®  The cost is usually less than $5 00   For your convenience, the [employer] carries the soap   It may also be available at your doctors office or pre-admission testing center, and at most retail pharmacies   If you are allergic or sensitive to soaps containing CHG, please let your doctor know so another antiseptic can be suggested   CHG antiseptic soap is for external use only  2  The day before your operation, follow these instructions carefully to get ready   Please clean linens (sheets) on your bed; you should sleep on clean sheets after your evening shower   Get clean towels and washcloth ready - you need enough for 2 showers   Set aside clean underwear, pajamas, and clothing to wear after the showers     Reminders:   DO NOT use any other soap or body rinse on your skin during or after the antiseptic showers   DO NOT use lotion, powder, deodorant, or perfume/aftershave of any kind on your skin after your antiseptic shower   DO NOT shave any body parts in the 24 hours/day before your operation   DO NOT get the antiseptic soap in your eyes, ears, nose, mouth, or vaginal area    3   You will need to shower the night before AND the morning of your surgery  Shower 1:   The first evening before the operation, take the first shower   First, shampoo your hair with regular shampoo and rinse it completely before you use the antiseptic soap  After washing and rinsing your hair, rinse your body   Next, use a clean washcloth to apply the antiseptic soap and wash your body from the neck down to your toes using ½ bottle of the antiseptic soap   You will use the other ½ bottle for the second shower   Clean the area where your incision will be; lather this area well for about 2 minutes   If you are having head or neck surgery, wash areas with the antiseptic soap   Rinse yourself completely with running water   Use a clean towel to dry off   Wear clean underwear and clothing/pajamas  Shower 2   The morning of your operation, take the second shower following the same steps as Shower 1 using the second ½ of the bottle of antiseptic soap   Use clean cloths and towels to wash and dry yourself   Wear clean underwear and clothing

## 2019-10-02 ENCOUNTER — ANESTHESIA EVENT (OUTPATIENT)
Dept: PERIOP | Facility: HOSPITAL | Age: 49
End: 2019-10-02
Payer: COMMERCIAL

## 2019-10-02 NOTE — PRE-PROCEDURE INSTRUCTIONS
Pre-Surgery Instructions:   Medication Instructions    buPROPion (WELLBUTRIN XL) 300 mg 24 hr tablet Instructed patient per Anesthesia Guidelines  Pre-op Showering Instructions for Surgery Patients    Before your operation, you play an important role in decreasing your risk for infection by washing with special antiseptic soap  This is an effective way to reduce bacteria on the skin which may help to prevent infections at the surgical site  Please read the following directions in advance  1  In the week before your operation, purchase a 4 ounce bottle of antiseptic soap containing chlorhexidine gluconate (CHG)  4%  Some brand names include: Aplicare®, Endure, and Hibiclens®  The cost is usually less than $5 00   For your convenience, the [employer] carries the soap   It may also be available at your doctors office or pre-admission testing center, and at most retail pharmacies   If you are allergic or sensitive to soaps containing CHG, please let your doctor know so another antiseptic can be suggested   CHG antiseptic soap is for external use only  2  The day before your operation, follow these instructions carefully to get ready   Please clean linens (sheets) on your bed; you should sleep on clean sheets after your evening shower   Get clean towels and washcloth ready - you need enough for 2 showers   Set aside clean underwear, pajamas, and clothing to wear after the showers     Reminders:   DO NOT use any other soap or body rinse on your skin during or after the antiseptic showers   DO NOT use lotion, powder, deodorant, or perfume/aftershave of any kind on your skin after your antiseptic shower   DO NOT shave any body parts in the 24 hours/day before your operation   DO NOT get the antiseptic soap in your eyes, ears, nose, mouth, or vaginal area    3   You will need to shower the night before AND the morning of your surgery  Shower 1:   The first evening before the operation, take the first shower   First, shampoo your hair with regular shampoo and rinse it completely before you use the antiseptic soap  After washing and rinsing your hair, rinse your body   Next, use a clean washcloth to apply the antiseptic soap and wash your body from the neck down to your toes using ½ bottle of the antiseptic soap   You will use the other ½ bottle for the second shower   Clean the area where your incision will be; lather this area well for about 2 minutes   If you are having head or neck surgery, wash areas with the antiseptic soap   Rinse yourself completely with running water   Use a clean towel to dry off   Wear clean underwear and clothing/pajamas  Shower 2   The morning of your operation, take the second shower following the same steps as Shower 1 using the second ½ of the bottle of antiseptic soap   Use clean cloths and towels to wash and dry yourself   Wear clean underwear and clothing

## 2019-10-02 NOTE — ANESTHESIA PREPROCEDURE EVALUATION
Review of Systems/Medical History  Patient summary reviewed  Chart reviewed  No history of anesthetic complications     Cardiovascular  Exercise tolerance (METS): >4,  Hyperlipidemia,    Pulmonary  Smoker ex-smoker  ,        GI/Hepatic  Negative GI/hepatic ROS          Negative  ROS        Endo/Other  Negative endo/other ROS      GYN       Hematology   Musculoskeletal    Arthritis     Neurology  Negative neurology ROS      Psychology   Anxiety, Depression , being treated for depression,              Physical Exam    Airway    Mallampati score: II         Dental       Cardiovascular  Cardiovascular exam normal    Pulmonary  Pulmonary exam normal     Other Findings  Fixed upper and lower teeth and in good repair      Anesthesia Plan  ASA Score- 2     Anesthesia Type- general with ASA Monitors  Additional Monitors:   Airway Plan: LMA  Comment: Old chart reviewed  Plan Factors-Patient not instructed to abstain from smoking on day of procedure  Patient did not smoke on day of surgery  Induction- intravenous  Postoperative Plan- Plan for postoperative opioid use  Informed Consent- Anesthetic plan and risks discussed with patient  I personally reviewed this patient with the CRNA  Discussed and agreed on the Anesthesia Plan with the CRNA  Leeann Valadez

## 2019-10-03 ENCOUNTER — HOSPITAL ENCOUNTER (OUTPATIENT)
Facility: HOSPITAL | Age: 49
Setting detail: OUTPATIENT SURGERY
Discharge: HOME | End: 2019-10-03
Attending: ORTHOPAEDIC SURGERY | Admitting: ORTHOPAEDIC SURGERY
Payer: COMMERCIAL

## 2019-10-03 ENCOUNTER — ANESTHESIA (OUTPATIENT)
Dept: PERIOP | Facility: HOSPITAL | Age: 49
End: 2019-10-03
Payer: COMMERCIAL

## 2019-10-03 VITALS
SYSTOLIC BLOOD PRESSURE: 126 MMHG | TEMPERATURE: 97.4 F | HEART RATE: 65 BPM | OXYGEN SATURATION: 97 % | DIASTOLIC BLOOD PRESSURE: 75 MMHG | RESPIRATION RATE: 22 BRPM

## 2019-10-03 DIAGNOSIS — M70.22 OLECRANON BURSITIS OF LEFT ELBOW: ICD-10-CM

## 2019-10-03 PROCEDURE — 87176 TISSUE HOMOGENIZATION CULTR: CPT | Performed by: ORTHOPAEDIC SURGERY

## 2019-10-03 PROCEDURE — 87070 CULTURE OTHR SPECIMN AEROBIC: CPT | Performed by: ORTHOPAEDIC SURGERY

## 2019-10-03 PROCEDURE — 88304 TISSUE EXAM BY PATHOLOGIST: CPT | Performed by: PATHOLOGY

## 2019-10-03 PROCEDURE — 87205 SMEAR GRAM STAIN: CPT | Performed by: ORTHOPAEDIC SURGERY

## 2019-10-03 PROCEDURE — 24105 EXCISION OLECRANON BURSA: CPT | Performed by: ORTHOPAEDIC SURGERY

## 2019-10-03 PROCEDURE — 87075 CULTR BACTERIA EXCEPT BLOOD: CPT | Performed by: ORTHOPAEDIC SURGERY

## 2019-10-03 PROCEDURE — 24105 EXCISION OLECRANON BURSA: CPT | Performed by: PHYSICIAN ASSISTANT

## 2019-10-03 RX ORDER — MAGNESIUM HYDROXIDE 1200 MG/15ML
LIQUID ORAL AS NEEDED
Status: DISCONTINUED | OUTPATIENT
Start: 2019-10-03 | End: 2019-10-03 | Stop reason: HOSPADM

## 2019-10-03 RX ORDER — SODIUM CHLORIDE, SODIUM LACTATE, POTASSIUM CHLORIDE, CALCIUM CHLORIDE 600; 310; 30; 20 MG/100ML; MG/100ML; MG/100ML; MG/100ML
75 INJECTION, SOLUTION INTRAVENOUS CONTINUOUS
Status: DISCONTINUED | OUTPATIENT
Start: 2019-10-03 | End: 2019-10-03 | Stop reason: HOSPADM

## 2019-10-03 RX ORDER — CEPHALEXIN 500 MG/1
500 CAPSULE ORAL EVERY 8 HOURS SCHEDULED
Qty: 15 CAPSULE | Refills: 0 | Status: SHIPPED | OUTPATIENT
Start: 2019-10-03 | End: 2019-10-08

## 2019-10-03 RX ORDER — FENTANYL CITRATE/PF 50 MCG/ML
12.5 SYRINGE (ML) INJECTION
Status: DISCONTINUED | OUTPATIENT
Start: 2019-10-03 | End: 2019-10-03 | Stop reason: HOSPADM

## 2019-10-03 RX ORDER — CEFAZOLIN SODIUM 1 G/3ML
INJECTION, POWDER, FOR SOLUTION INTRAMUSCULAR; INTRAVENOUS AS NEEDED
Status: DISCONTINUED | OUTPATIENT
Start: 2019-10-03 | End: 2019-10-03 | Stop reason: SURG

## 2019-10-03 RX ORDER — DIPHENHYDRAMINE HYDROCHLORIDE 50 MG/ML
12.5 INJECTION INTRAMUSCULAR; INTRAVENOUS ONCE
Status: DISCONTINUED | OUTPATIENT
Start: 2019-10-03 | End: 2019-10-03 | Stop reason: HOSPADM

## 2019-10-03 RX ORDER — DEXAMETHASONE SODIUM PHOSPHATE 4 MG/ML
INJECTION, SOLUTION INTRA-ARTICULAR; INTRALESIONAL; INTRAMUSCULAR; INTRAVENOUS; SOFT TISSUE AS NEEDED
Status: DISCONTINUED | OUTPATIENT
Start: 2019-10-03 | End: 2019-10-03 | Stop reason: SURG

## 2019-10-03 RX ORDER — KETOROLAC TROMETHAMINE 30 MG/ML
INJECTION, SOLUTION INTRAMUSCULAR; INTRAVENOUS AS NEEDED
Status: DISCONTINUED | OUTPATIENT
Start: 2019-10-03 | End: 2019-10-03 | Stop reason: SURG

## 2019-10-03 RX ORDER — FENTANYL CITRATE/PF 50 MCG/ML
25 SYRINGE (ML) INJECTION
Status: DISCONTINUED | OUTPATIENT
Start: 2019-10-03 | End: 2019-10-03 | Stop reason: HOSPADM

## 2019-10-03 RX ORDER — DEXAMETHASONE SODIUM PHOSPHATE 4 MG/ML
4 INJECTION, SOLUTION INTRA-ARTICULAR; INTRALESIONAL; INTRAMUSCULAR; INTRAVENOUS; SOFT TISSUE ONCE AS NEEDED
Status: DISCONTINUED | OUTPATIENT
Start: 2019-10-03 | End: 2019-10-03 | Stop reason: HOSPADM

## 2019-10-03 RX ORDER — PROPOFOL 10 MG/ML
INJECTION, EMULSION INTRAVENOUS AS NEEDED
Status: DISCONTINUED | OUTPATIENT
Start: 2019-10-03 | End: 2019-10-03 | Stop reason: SURG

## 2019-10-03 RX ORDER — FENTANYL CITRATE 50 UG/ML
INJECTION, SOLUTION INTRAMUSCULAR; INTRAVENOUS AS NEEDED
Status: DISCONTINUED | OUTPATIENT
Start: 2019-10-03 | End: 2019-10-03 | Stop reason: SURG

## 2019-10-03 RX ORDER — MIDAZOLAM HYDROCHLORIDE 1 MG/ML
INJECTION INTRAMUSCULAR; INTRAVENOUS AS NEEDED
Status: DISCONTINUED | OUTPATIENT
Start: 2019-10-03 | End: 2019-10-03 | Stop reason: SURG

## 2019-10-03 RX ORDER — LIDOCAINE HYDROCHLORIDE 10 MG/ML
INJECTION, SOLUTION INFILTRATION; PERINEURAL AS NEEDED
Status: DISCONTINUED | OUTPATIENT
Start: 2019-10-03 | End: 2019-10-03 | Stop reason: SURG

## 2019-10-03 RX ORDER — IBUPROFEN 800 MG/1
800 TABLET ORAL EVERY 8 HOURS PRN
Qty: 20 TABLET | Refills: 0 | Status: SHIPPED | OUTPATIENT
Start: 2019-10-03 | End: 2020-01-23 | Stop reason: ALTCHOICE

## 2019-10-03 RX ORDER — IBUPROFEN 800 MG/1
800 TABLET ORAL EVERY 8 HOURS PRN
Status: DISCONTINUED | OUTPATIENT
Start: 2019-10-03 | End: 2019-10-03 | Stop reason: HOSPADM

## 2019-10-03 RX ORDER — ONDANSETRON 2 MG/ML
INJECTION INTRAMUSCULAR; INTRAVENOUS AS NEEDED
Status: DISCONTINUED | OUTPATIENT
Start: 2019-10-03 | End: 2019-10-03 | Stop reason: SURG

## 2019-10-03 RX ADMIN — FENTANYL CITRATE 100 MCG: 50 INJECTION INTRAMUSCULAR; INTRAVENOUS at 11:13

## 2019-10-03 RX ADMIN — FENTANYL CITRATE 25 MCG: 50 INJECTION INTRAMUSCULAR; INTRAVENOUS at 13:07

## 2019-10-03 RX ADMIN — PROPOFOL 200 MG: 10 INJECTION, EMULSION INTRAVENOUS at 11:16

## 2019-10-03 RX ADMIN — SODIUM CHLORIDE, SODIUM LACTATE, POTASSIUM CHLORIDE, AND CALCIUM CHLORIDE: .6; .31; .03; .02 INJECTION, SOLUTION INTRAVENOUS at 11:10

## 2019-10-03 RX ADMIN — MIDAZOLAM HYDROCHLORIDE 2 MG: 1 INJECTION, SOLUTION INTRAMUSCULAR; INTRAVENOUS at 11:13

## 2019-10-03 RX ADMIN — ONDANSETRON HYDROCHLORIDE 4 MG: 2 INJECTION, SOLUTION INTRAMUSCULAR; INTRAVENOUS at 12:05

## 2019-10-03 RX ADMIN — LIDOCAINE HYDROCHLORIDE 50 MG: 10 INJECTION, SOLUTION INFILTRATION; PERINEURAL at 11:16

## 2019-10-03 RX ADMIN — FENTANYL CITRATE 25 MCG: 50 INJECTION INTRAMUSCULAR; INTRAVENOUS at 12:57

## 2019-10-03 RX ADMIN — KETOROLAC TROMETHAMINE 30 MG: 30 INJECTION, SOLUTION INTRAMUSCULAR; INTRAVENOUS at 12:23

## 2019-10-03 RX ADMIN — CEFAZOLIN SODIUM 2000 MG: 1 POWDER, FOR SOLUTION INTRAMUSCULAR; INTRAVENOUS at 11:50

## 2019-10-03 RX ADMIN — SODIUM CHLORIDE, SODIUM LACTATE, POTASSIUM CHLORIDE, AND CALCIUM CHLORIDE 75 ML/HR: .6; .31; .03; .02 INJECTION, SOLUTION INTRAVENOUS at 13:09

## 2019-10-03 RX ADMIN — DEXAMETHASONE SODIUM PHOSPHATE 4 MG: 4 INJECTION, SOLUTION INTRA-ARTICULAR; INTRALESIONAL; INTRAMUSCULAR; INTRAVENOUS; SOFT TISSUE at 11:20

## 2019-10-03 NOTE — ANESTHESIA POSTPROCEDURE EVALUATION
Post-Op Assessment Note    CV Status:  Stable  Pain Score: 1    Pain management: adequate     Mental Status:  Alert and awake   Hydration Status:  Euvolemic   PONV Controlled:  Controlled   Airway Patency:  Patent   Post Op Vitals Reviewed: Yes      Staff: Anesthesiologist, CRNA   Comments: LMA without any known post op airway issues          BP      Temp     Pulse     Resp      SpO2

## 2019-10-03 NOTE — ANESTHESIA POSTPROCEDURE EVALUATION
Post-Op Assessment Note    CV Status:  Stable  Pain Score: 1    Pain management: adequate     Mental Status:  Alert and awake   Hydration Status:  Euvolemic   PONV Controlled:  Controlled   Airway Patency:  Patent   Post Op Vitals Reviewed: Yes      Staff: Anesthesiologist, CRNA           BP      Temp     Pulse     Resp      SpO2

## 2019-10-03 NOTE — INTERVAL H&P NOTE
H&P reviewed  After examining the patient I find no changes in the patients condition since the H&P had been written      Vitals:    10/03/19 0931   BP: 125/76   Pulse: 64   Resp: 20   Temp: 97 6 °F (36 4 °C)   SpO2: 96%

## 2019-10-03 NOTE — DISCHARGE INSTR - AVS FIRST PAGE
CAST INSTRUCTIONS  Instrucciones del Yeso      1  Elevate cast next 3-4 days  Cast should be above level of your heart to allow blood to drain back  Wiggle-wiggle fingers or toes, even if it is slightly painful, to prevent stiffness  Mantenga el yeso elevado por los proximos 3-4 garcia  El yeso debe de estar por encima de el nivel de alfonso [place]  2  Apply ice in a large trash bag [or bag of frozen peas] to injured area for fifteen minutes, four times a day for 3-4 days to help decrease swelling  Aplique hielo en aubrey bolsa al area del el hueso fracturado por 15 minutos, 4 veces aldia por 3-4 garcia para prevenirel hinchazon  3  DO NOT put anything down inside the cast to scratch  This will likely cause infection  If you feel itching, use a blow dryer on a cold setting to blow air over the casted area  NO POWDER, either  No ponga nada adentro del yeso para rascarse  No se ponga polva  Deephaven puede causar infecciones  Si siente picor use un secador de pelo en [facility] [employer] area  4  DO NOT cut or alter the cast in any way  This is dangerous  No se benjamin o altere el yeso  Deephaven es [employer]  5  DO NOT get the cast wet! Sponge baths or bathe with the cast out of the tub  Do not use plastic bags as they often leak  Evite mojar el yeso  Banese con aubrey esponja o con el yeso fuera de la banera  No use bolsas plasticas, pues pueden liguear  6  Some swelling and black-and-blue discoloration is normal   It is not normal to have pins-and-needles, loss of feeling, rubbing, or white skin  If these happen, call your doctor or go immediately to the Emergency Room  Cambios normales son que se le ponga morada la piel  NO es normal que sienta adormecimiento o perdida de sensacion  Si esto ocurre  Llame a alfonso doctor o [facility] [employer]ischer a carlos de emergencies immedialamente      7  Call the office to make an appointment in _7-10____ days for cast check and check x-ray in the cast   Llame la oficina para hacer aubrey crystal en ______ garcia

## 2019-10-03 NOTE — OP NOTE
OPERATIVE REPORT  PATIENT NAME: Alexey Shrestha    :  1970  MRN: [id number]  Pt Location:  OR ROOM 10    SURGERY DATE: 10/3/2019    Surgeon(s) and Role:     * Jon Valdez MD - Primary     * Alyssa Parra PA-C - Assisting    Preop Diagnosis:  Olecranon bursitis of left elbow [M70 22]    Post-Op Diagnosis Codes:     * Olecranon bursitis of left elbow [M70 22]    Procedure(s) (LRB):  EXCISION BURSA OLECRANON ELBOW (Left)    Specimen(s):  ID Type Source Tests Collected by Time Destination   1 : skin and bursa left elbow Tissue Soft Tissue, Other TISSUE EXAM Jon Valdez MD 10/3/2019 1147    A : left elbow Tissue Joint, Left Elbow ANAEROBIC CULTURE AND GRAM STAIN, CULTURE, TISSUE AND GRAM STAIN Jon Valdez MD 10/3/2019 1132        Estimated Blood Loss:   Minimal    Drains:  * No LDAs found *    Anesthesia Type:   General    Operative Indications:  Olecranon bursitis of left elbow [M70 22]  Chronic drainage and skin breakdown over tip of left elbow    Operative Findings: Thickened disrupted left olecranon bursa  No gross pus seen    Complications:   None    Procedure and Technique:  After satisfactory general anesthesia was induced the left arm was scrubbed with 3% PCMX and painted with ChloraPrep solution and draped in a sterile manner  The olecranon bursa was outlined by some necrotic skin and bursa which was actually visible through the skin  I carefully constructed proposed incision was marked  ellipsing out the necrotic tissue with leaving enough skin for closure  The limb was then exsanguinated and the pneumatic tourniquet inflated to 150 mmHg  The proposed skin incision was then made with a 15 blade and the elliptical necrotic tissue in the center portion of the incision was removed  Aerobic and  anaerobic cultures were taken at this time and the necrotic skin and bursa was sent for pathologic diagnosis  The bursa was further excised down to the triceps aponeurosis using Metzenbaum scissors  Specific care was taken on the ulnar aspect of the olecranon to prevent any injury to the ulnar nerve  Then a curette was used to remove any necrotic debris off the bone and fascia and any remaining bursa was removed with a small rongeur  The wound was then thoroughly irrigated with 6 L of saline using gravity irrigation to remove any further debris  Then a few sutures in the subcutaneous tissue were placed with 3 0 PDS and then the skin was closed with 3 0 nylon  Adaptic gauze 4x4s Webril and a long-arm fiberglass splint and sling were applied and the tourniquet was deflated  There was good capillary refill in the fingers at the end of the  procedure  The patient tolerated the procedure well and was transferred to the recovery room in satisfactory stable condition       A physician assistant was required during the procedure for retraction tissue handling,dissection and suturing    No surgical orthopedic resident was available for assistance    Patient Disposition:  PACU     SIGNATURE: Ruth Mason MD  DATE: October 3, 2019  TIME: 2:09 PM

## 2019-10-06 LAB
BACTERIA SPEC ANAEROBE CULT: NORMAL
BACTERIA TISS AEROBE CULT: NORMAL
GRAM STN SPEC: NORMAL

## 2019-10-08 ENCOUNTER — TELEPHONE (OUTPATIENT)
Dept: OBGYN CLINIC | Facility: CLINIC | Age: 49
End: 2019-10-08

## 2019-10-08 NOTE — TELEPHONE ENCOUNTER
The patient called and wanted his long-arm splint off earlier because the cultures were essentially negative  The reason he is in a splint is for skin healing issues, not so much potential infection  I do not want the stitches or skin put under tension until I am sure that is healing uneventfully  He will return to see me in 6 days for splint off and wound check

## 2019-10-14 ENCOUNTER — OFFICE VISIT (OUTPATIENT)
Dept: OBGYN CLINIC | Facility: OTHER | Age: 49
End: 2019-10-14

## 2019-10-14 VITALS — HEIGHT: 71 IN | BODY MASS INDEX: 28.7 KG/M2 | WEIGHT: 205 LBS

## 2019-10-14 DIAGNOSIS — M70.22 OLECRANON BURSITIS OF LEFT ELBOW: Primary | ICD-10-CM

## 2019-10-14 PROCEDURE — 99024 POSTOP FOLLOW-UP VISIT: CPT | Performed by: ORTHOPAEDIC SURGERY

## 2019-10-14 NOTE — PROGRESS NOTES
Chief Complaint   Patient presents with    Left Elbow - Post-op           Assessment:  Left olecranon bursitis- status post excision of bursa 10/03/2019    Plan :  I am very happy with his early healing  We must continue to allow the incision to heal over the next several weeks  He may now shower but I do not want him doing any heavy weight lifting,  strengthening , or other strenuous activities with this elbow yet  He may certainly use it for his normal activities of daily living  I want to let the skin heal for another few days and then next week he can start wearing his elbow pad to protect this area if he is doing more strenuous activities  He can let the Steri-Strips fall off in the shower over the next several days  I want to see him back again in a month for re-evaluation and routine follow-up  HPI:   This is a 51-year-old male presenting today for orthopedic evaluation regarding his left elbow  He is left-hand dominant  He states that 6 months ago he was evaluated at [facility] and diagnosed with left olecranon bursitis  He was placed on antibiotics and had no follow ups  On 9/13/19 he was carrying his dog and slipped on a step  He hit his elbow on the edge of the step which created a wound which was draining  He presented to one of our urgent care facilities 5 days later where they did a culture of the drainage and he was referred to our practice  He states that this is not necessarily bothersome see his elbow directly  He does keep this covered as it is still draining  He denies any redness, warmth, fever or chills  Because of drainage from this area I took him to the operating room at [facility] on 10/03/2019 and excised a chronically inflamed olecranon bursa from his left elbow  No organisms were found in the regular culture and some skin floor was found in the broth only     He has been in a splint for the last 2 weeks  He returns now on 10/14/2019      The remainder of this patient's past medical history, family history, social history, medicines, and allergies was reviewed in the chart  Please see HPI for pertinent review of systems  All other systems reviewed are negative  He does have impaired fasting glucose, anxiety/depression, hyperlipidemia, and EGD among other issues    Physical Exam:  Ht 5' 11" (1 803 m)   Wt 93 kg (205 lb)   BMI 28 59 kg/m²   I removed the sutures from his left elbow today  The incision is clean and healing  There is no discharge, cellulitis, or redness around the area  He has full elbow extension and good flexion    Radial pulse was normal   He had good  strength and sensation in his left hand    Studies reviewed:  None to review        Scribe Attestation    I,:    am acting as a scribe while in the presence of the attending physician :        I,:    personally performed the services described in this documentation    as scribed in my presence :

## 2019-10-14 NOTE — PATIENT INSTRUCTIONS
Plan :  I am very happy with his early healing  We must continue to allow the incision to heal over the next several weeks  He may now shower but I do not want him doing any heavy weight lifting,  strengthening , or other strenuous activities with this elbow yet  He may certainly use it for his normal activities of daily living  I want to let the skin heal for another few days and then next week he can start wearing his elbow pad to protect this area if he is doing more strenuous activities  He can let the Steri-Strips fall off in the shower over the next several days  I want to see him back again in a month for re-evaluation and routine follow-up

## 2019-10-17 ENCOUNTER — OFFICE VISIT (OUTPATIENT)
Dept: FAMILY MEDICINE CLINIC | Facility: CLINIC | Age: 49
End: 2019-10-17
Payer: COMMERCIAL

## 2019-10-17 VITALS
HEART RATE: 88 BPM | TEMPERATURE: 97.9 F | OXYGEN SATURATION: 97 % | BODY MASS INDEX: 29.09 KG/M2 | SYSTOLIC BLOOD PRESSURE: 110 MMHG | RESPIRATION RATE: 16 BRPM | DIASTOLIC BLOOD PRESSURE: 84 MMHG | WEIGHT: 207.8 LBS | HEIGHT: 71 IN

## 2019-10-17 DIAGNOSIS — N52.9 ERECTILE DYSFUNCTION, UNSPECIFIED ERECTILE DYSFUNCTION TYPE: ICD-10-CM

## 2019-10-17 DIAGNOSIS — Z23 NEED FOR INFLUENZA VACCINATION: ICD-10-CM

## 2019-10-17 DIAGNOSIS — R79.89 LOW TESTOSTERONE IN MALE: Primary | ICD-10-CM

## 2019-10-17 DIAGNOSIS — E78.2 MIXED HYPERLIPIDEMIA: ICD-10-CM

## 2019-10-17 PROCEDURE — 3008F BODY MASS INDEX DOCD: CPT | Performed by: FAMILY MEDICINE

## 2019-10-17 PROCEDURE — 90686 IIV4 VACC NO PRSV 0.5 ML IM: CPT

## 2019-10-17 PROCEDURE — 99214 OFFICE O/P EST MOD 30 MIN: CPT | Performed by: FAMILY MEDICINE

## 2019-10-17 PROCEDURE — 90471 IMMUNIZATION ADMIN: CPT

## 2019-10-17 RX ORDER — TESTOSTERONE 20.25 MG/1.25G
1 GEL TOPICAL DAILY
Qty: 75 G | Refills: 0 | Status: SHIPPED | OUTPATIENT
Start: 2019-10-17 | End: 2019-12-30 | Stop reason: SDUPTHER

## 2019-10-17 NOTE — PROGRESS NOTES
Chief Complaint   Patient presents with    Follow-up     Review labs   Flu Vaccine     Health Maintenance   Topic Date Due    INFLUENZA VACCINE  07/01/2019    BMI: Followup Plan  12/10/2019    BMI: Adult  10/17/2020    DTaP,Tdap,and Td Vaccines (2 - Td) 10/29/2026    Pneumococcal Vaccine: 65+ Years (1 of 2 - PCV13) 11/01/2037    Pneumococcal Vaccine: Pediatrics (0 to 5 Years) and At-Risk Patients (6 to 59 Years)  Aged Out    HEPATITIS B VACCINES  Aged Out     Assessment/Plan:    Mixed hyperlipidemia  9/2019 Lipid 256/193/52/165 elevated  ASCVD 2 5%  Low fat diet  Not on statins  Depression with anxiety  Stable  Continue bupropion 300mg QD  Reviewed side effects of testosterone supplement including increased risk for prostate cancer, stroke, MI, dependence etc  Pt states he did some research and understands, would like to try  Give testosterone gel  PDMP checked and it was appropriate  Give flu shot today  RTO in 3 months with labs  Diagnoses and all orders for this visit:    Low testosterone in male  -     Testosterone 1 62 % GEL; Place 1 Act on the skin daily  -     CBC and differential; Future  -     PSA, total and free; Future  -     Testosterone, free, total; Future  -     Comprehensive metabolic panel; Future    Erectile dysfunction, unspecified erectile dysfunction type  -     Testosterone 1 62 % GEL; Place 1 Act on the skin daily  -     CBC and differential; Future  -     PSA, total and free; Future  -     Testosterone, free, total; Future    Need for influenza vaccination  -     influenza vaccine, [id number], quadrivalent, 0 5 mL, preservative-free, for adult and pediatric patients 6 mos+ (AFLURIA, FLUARIX, FLULAVAL, FLUZONE)          Subjective:      Patient ID: Larry Bates is a 57 y o  male  HPI    Pt is here by himself  C/o low libido, tired all the time for a year  Erectile dysfunction for a while   Tried different antidepressant which helped but still has symptoms  Pt states he exercise regularly but hard to lose weight    9/2019 testosterone 278 low normal range  Pt would like to try testosterone supplement  Hyperlipidemia---Pt tried to follow low fat diet  9/2019 256/193/52/165 stable  Denies hx of stroke or MI  Depression/anxiety----for years  He is on wellbutrin 300mg daily  Feels it is working  Denies suicidal ideation  Mother had depression/anxiety/?bipolar  Cousins had depression/anxiety per pt       No smoking  Alcohol 1 glass of wine daily  No drugs      The following portions of the patient's history were reviewed and updated as appropriate: allergies, current medications, past family history, past medical history, past social history, past surgical history and problem list     Review of Systems   Constitutional: Negative for appetite change, chills and fever  HENT: Negative for congestion, ear pain, sinus pain and sore throat  Eyes: Negative for discharge and itching  Respiratory: Negative for apnea, cough, chest tightness, shortness of breath and wheezing  Cardiovascular: Negative for chest pain, palpitations and leg swelling  Gastrointestinal: Negative for abdominal pain, anal bleeding, constipation, diarrhea, nausea and vomiting  Endocrine: Negative for cold intolerance, heat intolerance and polyuria  Genitourinary: Negative for difficulty urinating and dysuria  Musculoskeletal: Negative for arthralgias, back pain and myalgias  Skin: Negative for rash  Neurological: Negative for dizziness and headaches  Psychiatric/Behavioral: Negative for agitation  Objective:      /84 (BP Location: Left arm, Patient Position: Sitting, Cuff Size: Adult)   Pulse 88   Temp 97 9 °F (36 6 °C) (Tympanic)   Resp 16   Ht 5' 11" (1 803 m)   Wt 94 3 kg (207 lb 12 8 oz)   SpO2 97%   BMI 28 98 kg/m²          Physical Exam   Constitutional: He appears well-developed  HENT:   Head: Normocephalic and atraumatic  Eyes: Pupils are equal, round, and reactive to light  Conjunctivae are normal    Neck: Normal range of motion  Neck supple  Cardiovascular: Normal rate, regular rhythm, normal heart sounds and intact distal pulses  Exam reveals no gallop and no friction rub  No murmur heard  Pulmonary/Chest: Effort normal and breath sounds normal  No stridor  No respiratory distress  He has no wheezes  He has no rales  He exhibits no tenderness  Abdominal: Soft  Bowel sounds are normal    Musculoskeletal: Normal range of motion  He exhibits no edema  Neurological: He is alert

## 2019-11-04 ENCOUNTER — TELEPHONE (OUTPATIENT)
Dept: FAMILY MEDICINE CLINIC | Facility: CLINIC | Age: 49
End: 2019-11-04

## 2019-11-04 NOTE — TELEPHONE ENCOUNTER
Patient phoned to state his insurance is requiring medical necessity for the testosterone 1 62% gel, 75 g - the phone number for [employer] to call is [phone] option 2-needs to be done as an urgent request otherwise it will take several weeks  Patient can be contacted at [phone] with any questions

## 2019-11-18 ENCOUNTER — OFFICE VISIT (OUTPATIENT)
Dept: OBGYN CLINIC | Facility: OTHER | Age: 49
End: 2019-11-18

## 2019-11-18 VITALS
DIASTOLIC BLOOD PRESSURE: 83 MMHG | HEART RATE: 75 BPM | WEIGHT: 214 LBS | SYSTOLIC BLOOD PRESSURE: 131 MMHG | HEIGHT: 71 IN | BODY MASS INDEX: 29.96 KG/M2

## 2019-11-18 DIAGNOSIS — M70.22 OLECRANON BURSITIS OF LEFT ELBOW: Primary | ICD-10-CM

## 2019-11-18 PROCEDURE — 99024 POSTOP FOLLOW-UP VISIT: CPT | Performed by: ORTHOPAEDIC SURGERY

## 2019-11-18 NOTE — TELEPHONE ENCOUNTER
Patient wants to speak with Dr Katherin Bruno regarding his Testosterone Gel  What is states and when will it be approved

## 2019-11-18 NOTE — PROGRESS NOTES
Chief Complaint   Patient presents with    Left Elbow - Post-op           Assessment:  Left olecranon bursitis- status post excision of bursa 10/03/2019    Plan :  I am pleased with the skin healing  I want him to be a little more vigorous with scar desensitization with cocoa butter with friction massage as I explained again to him  He can always put ice on the area for 15 minutes 4 times a day and take Advil, Aleve, or Tylenol if needed  I do not think the bursa will swell up as a consequence of the surgery, as he is now about 7 weeks postop  He may do all activities as his pain permits, letting pain be the warning guide to his activity level  He should try to avoid rubbing the elbow on the side of a chair or table to try to prevent any recurrence  I will see him in 2 months just for wound check and routine follow-up    HPI:   This is a 26-year-old male presenting today for orthopedic evaluation regarding his left elbow  He is left-hand dominant  He states that 6 months ago he was evaluated at [facility] and diagnosed with left olecranon bursitis  He was placed on antibiotics and had no follow ups  On 9/13/19 he was carrying his dog and slipped on a step  He hit his elbow on the edge of the step which created a wound which was draining  He presented to one of our urgent care facilities 5 days later where they did a culture of the drainage and he was referred to our practice  He states that this is not necessarily bothersome see his elbow directly  He does keep this covered as it is still draining  He denies any redness, warmth, fever or chills  Because of drainage from this area I took him to the operating room at [facility] on 10/03/2019 and excised a chronically inflamed olecranon bursa from his left elbow  No organisms were found in the regular culture and some skin floor was found in the broth only     He has been in a splint for the last 2 weeks  He returns now on 10/14/2019     Patient returns, 11/18/19, for a follow up for his Left olecranon bursectomy that was performed 10/03/2019  No organisms were found in the regular culture and some skin nani was found in the broth only  He has been able to do his activities of daily living without issues, but no lifting  He is back to cardio and back to some  Light lifting without issues  He has tried a elbow sleeve but it was too tight along the biceps so he returned it  He is just aware of where he is placing it  He doesn't need to take anything for the discomfort  The remainder of this patient's past medical history, family history, social history, medicines, and allergies was reviewed in the chart  Please see HPI for pertinent review of systems  All other systems reviewed are negative  He does have impaired fasting glucose, anxiety/depression, hyperlipidemia, and EGD among other issues    Physical Exam:  /83   Pulse 75   Ht 5' 11" (1 803 m)   Wt 97 1 kg (214 lb)   BMI 29 85 kg/m²   Upon inspection there is no discharge, cellulitis, or redness around the area  The incision is healed and intact  There are small scar tissue nodules along the incision that are specifically non-tender, but has some global tenderness at the olecranon  He has full elbow extension and good flexion  Good pronation and supination  He had minimal crepitus on elbow flexion extension    Radial pulse was normal   He had good  strength and sensation in his left hand    Studies reviewed:  None to review        Scribe Attestation    I,:   Ivonne Waggoner am acting as a scribe while in the presence of the attending physician :        I,:   Lyndsay Gonzalez MD personally performed the services described in this documentation    as scribed in my presence :

## 2019-11-18 NOTE — PATIENT INSTRUCTIONS
Plan :  I am pleased with the skin healing  I want him to be a little more vigorous with scar desensitization with cocoa butter with friction massage as I explained again to him  He can always put ice on the area for 15 minutes 4 times a day and take Advil, Aleve, or Tylenol if needed  I do not think the bursa will swell up as a consequence of the surgery, as he is now about 7 weeks postop  He may do all activities as his pain permits, letting pain be the warning guide to his activity level  He should try to avoid rubbing the elbow on the side of a chair or table to try to prevent any recurrence    I will see him in 2 months just for wound check and routine follow-up

## 2019-11-25 NOTE — TELEPHONE ENCOUNTER
Called [facility] spoke with customer service states med denied because patient would need labs for testosterone level with initial and a repeat lab within 24 hours, will send form via fax with details

## 2019-12-03 DIAGNOSIS — R79.89 LOW TESTOSTERONE IN MALE: ICD-10-CM

## 2019-12-03 DIAGNOSIS — N52.9 ERECTILE DYSFUNCTION, UNSPECIFIED ERECTILE DYSFUNCTION TYPE: Primary | ICD-10-CM

## 2019-12-04 NOTE — TELEPHONE ENCOUNTER
Pt states he already bought testosterone gel out of pocket which may affect the lab result  I advised pt to stop it for 1 week, then recheck testosterone level  Pt agreed

## 2019-12-06 ENCOUNTER — OFFICE VISIT (OUTPATIENT)
Dept: OBGYN CLINIC | Facility: MEDICAL CENTER | Age: 49
End: 2019-12-06
Payer: COMMERCIAL

## 2019-12-06 VITALS
BODY MASS INDEX: 30.66 KG/M2 | SYSTOLIC BLOOD PRESSURE: 121 MMHG | DIASTOLIC BLOOD PRESSURE: 76 MMHG | HEIGHT: 71 IN | WEIGHT: 219 LBS | HEART RATE: 67 BPM

## 2019-12-06 DIAGNOSIS — M75.22 BICEPS TENDONITIS ON LEFT: ICD-10-CM

## 2019-12-06 DIAGNOSIS — M75.52 ACUTE SHOULDER BURSITIS, LEFT: ICD-10-CM

## 2019-12-06 DIAGNOSIS — M25.512 LEFT SHOULDER PAIN, UNSPECIFIED CHRONICITY: Primary | ICD-10-CM

## 2019-12-06 PROCEDURE — 20610 DRAIN/INJ JOINT/BURSA W/O US: CPT | Performed by: ORTHOPAEDIC SURGERY

## 2019-12-06 PROCEDURE — 99213 OFFICE O/P EST LOW 20 MIN: CPT | Performed by: ORTHOPAEDIC SURGERY

## 2019-12-06 RX ORDER — LIDOCAINE HYDROCHLORIDE 10 MG/ML
2 INJECTION, SOLUTION INFILTRATION; PERINEURAL
Status: COMPLETED | OUTPATIENT
Start: 2019-12-06 | End: 2019-12-06

## 2019-12-06 RX ORDER — METHYLPREDNISOLONE ACETATE 40 MG/ML
2 INJECTION, SUSPENSION INTRA-ARTICULAR; INTRALESIONAL; INTRAMUSCULAR; SOFT TISSUE
Status: COMPLETED | OUTPATIENT
Start: 2019-12-06 | End: 2019-12-06

## 2019-12-06 RX ADMIN — METHYLPREDNISOLONE ACETATE 2 ML: 40 INJECTION, SUSPENSION INTRA-ARTICULAR; INTRALESIONAL; INTRAMUSCULAR; SOFT TISSUE at 11:09

## 2019-12-06 RX ADMIN — LIDOCAINE HYDROCHLORIDE 2 ML: 10 INJECTION, SOLUTION INFILTRATION; PERINEURAL at 11:09

## 2019-12-06 NOTE — PROGRESS NOTES
Orthopaedic Surgery - Office Note  Argenis Macario (10 y o  male)   : 1970   MRN: [id number]  Encounter Date: 2019    Chief Complaint   Patient presents with    Left Shoulder - Pain       Assessment / Plan  Biceps tendonitis, rotator cuff bursitis, diffuse tenderness over biceps    · Patient had negative speed's on exam, tender over biceps, CSI was given at maximum point of tenderness  Positive Jones as well but normal cuff strength  · Offered and accepted CSI today, ice shoulder over next few days  · Activities to tolerance, see back in 3 weeks, if symptoms persist then may get new MRI  · Anti inflammatories or OTC NSAIDs for pain control  Return in about 3 weeks (around 2019) for Recheck  History of Present Illness  Argenis Macario is a 54 y o  male who presents for evaluation of left shoulder pain  Has history SLAP tear confirmed by MRI , then Dr Xavier Thomas did arthroscopic tenodesis 2018  Patient followed with 2 rounds of rehab in 2018  Also has history surgery by Dr Alem Pinto  No new injury or trauma, noted discomfort about 10 days ago anterior shoulder after doing mulch and working out in gym  He did not feel pop or tear in shoulder  Pain is restricting him when he reaches out to grab items, reaching across his body  Pain has remained constant, no current treatment  Denies any instability  Review of Systems  Pertinent items are noted in HPI  All other systems were reviewed and are negative  Physical Exam  /76   Pulse 67   Ht 5' 11" (1 803 m)   Wt 99 3 kg (219 lb)   BMI 30 54 kg/m²   Cons: Appears well  No apparent distress  Psych: Alert  Oriented x3  Mood and affect normal   Eyes: PERRLA, EOMI  Resp: Normal effort  No audible wheezing or stridor  CV: Palpable pulse  No discernable arrhythmia  No LE edema  Lymph:  No palpable cervical, axillary, or inguinal lymphadenopathy  Skin: Warm  No palpable masses  No visible lesions    Neuro: Normal muscle tone   Normal and symmetric DTR's  Left Shoulder Exam  Alignment / Posture:  Normal shoulder posture  Inspection:  No swelling  No edema  No erythema  No ecchymosis  Palpation:  Anterior shoulder over biceps tenodesis site   ROM:  Normal shoulder ROM  Strength:  5/5 supraspinatus, infraspinatus, and subscapularis  Stability:  No objective shoulder instability  Tests: (+) Jones  (-) Belly press  (-) Speed  Neurovascular:  Sensation intact in Ax/R/M/U nerve distributions  2+ radial pulse  Studies Reviewed  No new imaging to review     Large joint arthrocentesis  Date/Time: 12/6/2019 11:09 AM  Consent given by: patient  Site marked: site marked  Timeout: Immediately prior to procedure a time out was called to verify the correct patient, procedure, equipment, support staff and site/side marked as required   Supporting Documentation  Indications: pain   Procedure Details  Location: shoulder - Shoulder joint: bicipital groove   Preparation: Patient was prepped and draped in the usual sterile fashion  Needle size: 22 G  Approach: anterior  Medications administered: 2 mL lidocaine 1 %; 2 mL methylPREDNISolone acetate 40 mg/mL    Patient tolerance: patient tolerated the procedure well with no immediate complications  Dressing:  Sterile dressing applied            Medical, Surgical, Family, and Social History  The patient's medical history, family history, and social history, were reviewed and updated as appropriate      Past Medical History:   Diagnosis Date    Anxiety     Hyperlipidemia     borderline    Impaired fasting glucose     Olecranon bursitis, left elbow     Shoulder arthritis     left    Vitamin D deficiency        Past Surgical History:   Procedure Laterality Date    ARTHROSCOPIC BICEPS TENODESIS Left 2/8/2018    Procedure: SHOULDER ARTHROSCOPY WITH BICEPS TENODESIS;  Surgeon: Katie Nicholas MD;  Location: [address] [place] [address];  Service: Orthopedics    [place] REMOVAL OF ELBOW BURSA Left 10/3/2019 Procedure: EXCISION BURSA OLECRANON ELBOW;  Surgeon: Lisa Ron MD;  Location: [address] [place] [address];  Service: Orthopedics    ROTATOR CUFF REPAIR      ROTATOR CUFF REPAIR Left     x2 last one     SEPTOPLASTY         Family History   Problem Relation Age of Onset    No Known Problems Mother     No Known Problems Father        Social History     Occupational History    Not on file   Tobacco Use    Smoking status: Former Smoker     Years: 15 00     Last attempt to quit: 2007     Years since quittin 8    Smokeless tobacco: Never Used   Substance and Sexual Activity    Alcohol use: Yes     Comment: socially    Drug use: No    Sexual activity: Not on file       No Known Allergies      Current Outpatient Medications:     acyclovir (ZOVIRAX) 5 % ointment, Apply topically every 4 (four) hours (Patient not taking: Reported on 2019), Disp: 15 g, Rfl: 0    buPROPion (WELLBUTRIN XL) 300 mg 24 hr tablet, Take 1 tablet (300 mg total) by mouth daily for 90 days, Disp: 90 tablet, Rfl: 3    Cholecalciferol (VITAMIN D) 2000 units CAPS, Take by mouth, Disp: , Rfl:     glucosamine-chondroitin 500-400 MG tablet, Take 1 tablet by mouth every morning, Disp: , Rfl:     ibuprofen (MOTRIN) 800 mg tablet, Take 1 tablet (800 mg total) by mouth every 8 (eight) hours as needed for mild pain (Patient not taking: Reported on 2019), Disp: 20 tablet, Rfl: 0    Multiple Vitamin (MULTIVITAMIN) tablet, Take 1 tablet by mouth daily, Disp: , Rfl:     Omega-3 Fatty Acids (FISH OIL) 1,000 mg, Take 1,000 mg by mouth daily, Disp: , Rfl:     Testosterone 1 62 % GEL, Place 1 Act on the skin daily (Patient not taking: Reported on 2019), Disp: 75 g, Rfl: 0    valACYclovir (VALTREX) 1,000 mg tablet, Take 2 tablets (2,000 mg total) by mouth 2 (two) times a day for 2 days (Patient taking differently: Take 2,000 mg by mouth 2 (two) times a day as needed ), Disp: 8 tablet, Rfl: 0      Escobar Villaseñor I,: Rich Rodriguez am acting as a scribe while in the presence of the attending physician :        I,:   Roseline Leyva MD personally performed the services described in this documentation    as scribed in my presence :

## 2019-12-10 ENCOUNTER — OFFICE VISIT (OUTPATIENT)
Dept: OBGYN CLINIC | Facility: MEDICAL CENTER | Age: 49
End: 2019-12-10
Payer: COMMERCIAL

## 2019-12-10 VITALS
DIASTOLIC BLOOD PRESSURE: 87 MMHG | HEIGHT: 71 IN | BODY MASS INDEX: 30.66 KG/M2 | WEIGHT: 219 LBS | HEART RATE: 84 BPM | SYSTOLIC BLOOD PRESSURE: 123 MMHG

## 2019-12-10 DIAGNOSIS — M19.012 ARTHRITIS OF LEFT SHOULDER REGION: Primary | ICD-10-CM

## 2019-12-10 PROCEDURE — 99213 OFFICE O/P EST LOW 20 MIN: CPT | Performed by: PHYSICIAN ASSISTANT

## 2019-12-10 PROCEDURE — 20610 DRAIN/INJ JOINT/BURSA W/O US: CPT | Performed by: PHYSICIAN ASSISTANT

## 2019-12-10 RX ORDER — BUPIVACAINE HYDROCHLORIDE 2.5 MG/ML
4 INJECTION, SOLUTION INFILTRATION; PERINEURAL
Status: COMPLETED | OUTPATIENT
Start: 2019-12-10 | End: 2019-12-10

## 2019-12-10 RX ORDER — METHYLPREDNISOLONE ACETATE 40 MG/ML
1 INJECTION, SUSPENSION INTRA-ARTICULAR; INTRALESIONAL; INTRAMUSCULAR; SOFT TISSUE
Status: COMPLETED | OUTPATIENT
Start: 2019-12-10 | End: 2019-12-10

## 2019-12-10 RX ADMIN — BUPIVACAINE HYDROCHLORIDE 4 ML: 2.5 INJECTION, SOLUTION INFILTRATION; PERINEURAL at 12:18

## 2019-12-10 RX ADMIN — METHYLPREDNISOLONE ACETATE 1 ML: 40 INJECTION, SUSPENSION INTRA-ARTICULAR; INTRALESIONAL; INTRAMUSCULAR; SOFT TISSUE at 12:18

## 2019-12-10 NOTE — PROGRESS NOTES
Orthopaedic Surgery - Office Note  Quentin Shea (34 y o  male)   : 1970   MRN: [id number]  Encounter Date: 12/10/2019    Chief Complaint   Patient presents with    Left Shoulder - Follow-up       Assessment / Plan  Left shoulder arthritic pain with resolving biceps tendinitis    · After discussion of risks and benefits patient agreed to proceed with steroid injection in the Glenohumeral joint at this time  He tolerated the injection well  · Continue to progress activity as tolerated at this time  · Continue with ICE and anlgesics/NSAIDs as needed  · Based on the patient's progress we will obtain an x-ray study of the left shoulder to further evaluate for arthritis follow-up but possibly getting a MRI based on the findings  Return for Patient will continue with his scheduled appointment on 2019  [email or web address] History of Present Illness  Quentin Shea is a 54 y o  male follow up for continued left shoulder pain  Has history SLAP tear confirmed by MRI  treated by Dr Ilana Lino did arthroscopic biceps tenodesis 2018  Patient completed PT following this surgery and was doing well up to about 2 weeks ago with no specific cause  Prior to that he had 2 surgeries by Dr Gillian Jackson  Last visit on 2019 he had a injection in the bicipital groove that improved significantly following this but he continued with pain in the posterior aspect of the shoulder  He has continue to exercise regularly and feels that the pain he has in the posterior aspect of his shoulder has not improved since the injection  He has continued with icing regularly and taking Advil also regularly for pain  Review of Systems  Pertinent items are noted in HPI  All other systems were reviewed and are negative  Physical Exam  /87   Pulse 84   Ht 5' 11" (1 803 m)   Wt 99 3 kg (219 lb)   BMI 30 54 kg/m²   Cons: Appears well  No apparent distress  Psych: Alert  Oriented x3    Mood and affect normal   Eyes: PERRLA, EOMI  Resp: Normal effort  No audible wheezing or stridor  CV: Palpable pulse  No discernable arrhythmia  No LE edema  Lymph:  No palpable cervical, axillary, or inguinal lymphadenopathy  Skin: Warm  No palpable masses  No visible lesions  Neuro: Normal muscle tone  Normal and symmetric DTR's  Left Shoulder Exam  Alignment / Posture:  Normal shoulder posture  Inspection:  No swelling  No edema  No erythema  No ecchymosis  Palpation:   Mild discomfort over biceps tenodesis site and bicipital groove  Mild to moderate tenderness over the posterior aspect of the shoulder joint  ROM:  Normal shoulder ROM  Patient does have pain with abduction/external rotation at approximately 90°  Strength:  5/5 supraspinatus, infraspinatus, and subscapularis  Stability:  No objective shoulder instability  Tests: (+) Jones  (+) Neer  (-) Belly press  (-) Speed  (-) McCone  Discomfort with the test is in the posterior aspect of the shoulder  Neurovascular:  Sensation intact in Ax/R/M/U nerve distributions  2+ radial pulse  Studies Reviewed  No new imaging to review     Large joint arthrocentesis: L glenohumeral  Date/Time: 12/10/2019 12:18 PM  Consent given by: patient  Site marked: site marked  Supporting Documentation  Indications: pain   Procedure Details  Location: shoulder - L glenohumeral  Needle size: 22 G  Ultrasound guidance: no  Approach: anterior  Medications administered: 4 mL bupivacaine 0 25 %; 1 mL methylPREDNISolone acetate 40 mg/mL    Patient tolerance: patient tolerated the procedure well with no immediate complications  Dressing:  Sterile dressing applied            Medical, Surgical, Family, and Social History  The patient's medical history, family history, and social history, were reviewed and updated as appropriate      Past Medical History:   Diagnosis Date    Anxiety     Hyperlipidemia     borderline    Impaired fasting glucose     Olecranon bursitis, left elbow     Shoulder arthritis     left    Vitamin D deficiency        Past Surgical History:   Procedure Laterality Date    ARTHROSCOPIC BICEPS TENODESIS Left 2018    Procedure: SHOULDER ARTHROSCOPY WITH BICEPS TENODESIS;  Surgeon: Brayan Dukes MD;  Location: [address] [place] [address];  Service: Orthopedics    CT REMOVAL OF ELBOW BURSA Left 10/3/2019    Procedure: EXCISION BURSA OLECRANON ELBOW;  Surgeon: Trice Kenney MD;  Location: [facility] MAIN OR;  Service: Orthopedics    ROTATOR CUFF REPAIR      ROTATOR CUFF REPAIR Left     x2 last one     SEPTOPLASTY         Family History   Problem Relation Age of Onset    No Known Problems Mother     No Known Problems Father        Social History     Occupational History    Not on file   Tobacco Use    Smoking status: Former Smoker     Years: 15      Last attempt to quit: 2007     Years since quittin 8    Smokeless tobacco: Never Used   Substance and Sexual Activity    Alcohol use: Yes     Comment: socially    Drug use: No    Sexual activity: Not on file       No Known Allergies      Current Outpatient Medications:     acyclovir (ZOVIRAX) 5 % ointment, Apply topically every 4 (four) hours, Disp: 15 g, Rfl: 0    Cholecalciferol (VITAMIN D) 2000 units CAPS, Take by mouth, Disp: , Rfl:     glucosamine-chondroitin 500-400 MG tablet, Take 1 tablet by mouth every morning, Disp: , Rfl:     ibuprofen (MOTRIN) 800 mg tablet, Take 1 tablet (800 mg total) by mouth every 8 (eight) hours as needed for mild pain, Disp: 20 tablet, Rfl: 0    Multiple Vitamin (MULTIVITAMIN) tablet, Take 1 tablet by mouth daily, Disp: , Rfl:     Omega-3 Fatty Acids (FISH OIL) 1,000 mg, Take 1,000 mg by mouth daily, Disp: , Rfl:     Testosterone 1 62 % GEL, Place 1 Act on the skin daily, Disp: 75 g, Rfl: 0    buPROPion (WELLBUTRIN XL) 300 mg 24 hr tablet, Take 1 tablet (300 mg total) by mouth daily for 90 days, Disp: 90 tablet, Rfl: 3    valACYclovir (VALTREX) 1,000 mg tablet, Take 2 tablets (2,000 mg total) by mouth 2 (two) times a day for 2 days (Patient taking differently: Take 2,000 mg by mouth 2 (two) times a day as needed ), Disp: 8 tablet, Rfl: 0      Montserrat Velazco PA-C    Scribe Attestation    I,:    am acting as a scribe while in the presence of the attending physician :        I,:    personally performed the services described in this documentation    as scribed in my presence :

## 2019-12-26 ENCOUNTER — APPOINTMENT (OUTPATIENT)
Dept: LAB | Facility: MEDICAL CENTER | Age: 49
End: 2019-12-26
Payer: COMMERCIAL

## 2019-12-26 DIAGNOSIS — R79.89 LOW TESTOSTERONE IN MALE: ICD-10-CM

## 2019-12-26 PROCEDURE — 36415 COLL VENOUS BLD VENIPUNCTURE: CPT

## 2019-12-26 PROCEDURE — 84402 ASSAY OF FREE TESTOSTERONE: CPT

## 2019-12-26 PROCEDURE — 84403 ASSAY OF TOTAL TESTOSTERONE: CPT

## 2019-12-27 LAB
TESTOST FREE SERPL-MCNC: 10.4 PG/ML (ref 6.8–21.5)
TESTOST SERPL-MCNC: 223 NG/DL (ref 264–916)

## 2019-12-30 DIAGNOSIS — R79.89 LOW TESTOSTERONE IN MALE: ICD-10-CM

## 2019-12-30 DIAGNOSIS — N52.9 ERECTILE DYSFUNCTION, UNSPECIFIED ERECTILE DYSFUNCTION TYPE: ICD-10-CM

## 2019-12-30 RX ORDER — TESTOSTERONE 20.25 MG/1.25G
1 GEL TOPICAL DAILY
Qty: 75 G | Refills: 0 | Status: SHIPPED | OUTPATIENT
Start: 2019-12-30 | End: 2020-01-23 | Stop reason: SDUPTHER

## 2019-12-30 NOTE — TELEPHONE ENCOUNTER
Collin started called [facility] spoke with Rocio Light approved auth # [id number] from 12/30/19 through 12/29/20  Patient notified Teena franco

## 2019-12-31 ENCOUNTER — OFFICE VISIT (OUTPATIENT)
Dept: OBGYN CLINIC | Facility: MEDICAL CENTER | Age: 49
End: 2019-12-31

## 2019-12-31 ENCOUNTER — APPOINTMENT (OUTPATIENT)
Dept: RADIOLOGY | Facility: MEDICAL CENTER | Age: 49
End: 2019-12-31
Payer: COMMERCIAL

## 2019-12-31 VITALS
HEART RATE: 71 BPM | SYSTOLIC BLOOD PRESSURE: 121 MMHG | DIASTOLIC BLOOD PRESSURE: 82 MMHG | WEIGHT: 205 LBS | HEIGHT: 71 IN | BODY MASS INDEX: 28.7 KG/M2

## 2019-12-31 DIAGNOSIS — M25.512 LEFT SHOULDER PAIN, UNSPECIFIED CHRONICITY: Primary | ICD-10-CM

## 2019-12-31 DIAGNOSIS — M19.012 ARTHRITIS OF LEFT SHOULDER REGION: ICD-10-CM

## 2019-12-31 PROCEDURE — 73030 X-RAY EXAM OF SHOULDER: CPT

## 2019-12-31 PROCEDURE — 99024 POSTOP FOLLOW-UP VISIT: CPT | Performed by: ORTHOPAEDIC SURGERY

## 2019-12-31 NOTE — PROGRESS NOTES
Orthopaedic Surgery - Office Note  Karly Schumacher (89 y o  male)   : 1970   MRN: [id number]  Encounter Date: 2019    Chief Complaint   Patient presents with    Left Shoulder - Follow-up       Assessment / Plan  Left shoulder pain    · MRI Arthrogram of left shoulder  · Activity as tolerated  Return for Recheck after MRI  History of Present Illness  Karly Schumacher is a 54 y o  male who presents for follow-up evaluation of left shoulder pain  He has been experiencing pain for the last couple of weeks with an insidious onset  He reports he is approximately 50% better after his last injection  He does occasionally still feel pain in his left shoulder however the intensity and frequency of the pain has decreased since his last appointment  He does take Aleve for his pain however he is unsure if it is helpful or not  He reports his pain as a mild to moderate pain depending on that the movement  He has a history of a SLAP tear confirmed by MRI in  in treated by Dr Yamil Avery, who performed an arthroscopic biceps tenodesis in 2018  He denies any further injury or trauma to his left shoulder  He denies any distal paresthesias  Review of Systems  Pertinent items are noted in HPI  All other systems were reviewed and are negative  Physical Exam  /82   Pulse 71   Ht 5' 11" (1 803 m)   Wt 93 kg (205 lb)   BMI 28 59 kg/m²   Cons: Appears well  No apparent distress  Psych: Alert  Oriented x3  Mood and affect normal   Eyes: PERRLA, EOMI  Resp: Normal effort  No audible wheezing or stridor  CV: Palpable pulse  No discernable arrhythmia  No LE edema  Lymph:  No palpable cervical, axillary, or inguinal lymphadenopathy  Skin: Warm  No palpable masses  No visible lesions  Neuro: Normal muscle tone  Normal and symmetric DTR's  Left Shoulder Exam  Alignment / Posture:  Normal shoulder posture  Inspection:  No swelling  No edema  No erythema  No ecchymosis    Palpation: Bicipital groove tenderness  ROM:  Shoulder   Shoulder ER 60  Shoulder IR t4  Strength:  5/5 supraspinatus, infraspinatus, and subscapularis  Stability:  No objective shoulder instability  Tests: (+) Karen  (+) Mateusz Apple  Neurovascular:  Sensation intact in Ax/R/M/U nerve distributions  2+ radial pulse  Studies Reviewed  XR of left shoulder - Obtained 2019 demonstrated mild osteoarthritis of the glenohumeral joint    Procedures  No procedures today  Medical, Surgical, Family, and Social History  The patient's medical history, family history, and social history, were reviewed and updated as appropriate      Past Medical History:   Diagnosis Date    Anxiety     Hyperlipidemia     borderline    Impaired fasting glucose     Olecranon bursitis, left elbow     Shoulder arthritis     left    Vitamin D deficiency        Past Surgical History:   Procedure Laterality Date    ARTHROSCOPIC BICEPS TENODESIS Left 2018    Procedure: SHOULDER ARTHROSCOPY WITH BICEPS TENODESIS;  Surgeon: Jeet Madrid MD;  Location: [address] [place] [address];  Service: Orthopedics    MD REMOVAL OF ELBOW BURSA Left 10/3/2019    Procedure: EXCISION BURSA OLECRANON ELBOW;  Surgeon: Mildred Knott MD;  Location: [facility] MAIN OR;  Service: Orthopedics    ROTATOR CUFF REPAIR      ROTATOR CUFF REPAIR Left     x2 last one     SEPTOPLASTY         Family History   Problem Relation Age of Onset    No Known Problems Mother     No Known Problems Father        Social History     Occupational History    Not on file   Tobacco Use    Smoking status: Former Smoker     Years: 15 00     Last attempt to quit: 2007     Years since quittin 9    Smokeless tobacco: Never Used   Substance and Sexual Activity    Alcohol use: Yes     Comment: socially    Drug use: No    Sexual activity: Not on file       No Known Allergies      Current Outpatient Medications:     acyclovir (ZOVIRAX) 5 % ointment, Apply topically every 4 (four) hours, Disp: 15 g, Rfl: 0    buPROPion (WELLBUTRIN XL) 300 mg 24 hr tablet, Take 1 tablet (300 mg total) by mouth daily for 90 days, Disp: 90 tablet, Rfl: 3    Cholecalciferol (VITAMIN D) 2000 units CAPS, Take by mouth, Disp: , Rfl:     glucosamine-chondroitin 500-400 MG tablet, Take 1 tablet by mouth every morning, Disp: , Rfl:     ibuprofen (MOTRIN) 800 mg tablet, Take 1 tablet (800 mg total) by mouth every 8 (eight) hours as needed for mild pain, Disp: 20 tablet, Rfl: 0    Multiple Vitamin (MULTIVITAMIN) tablet, Take 1 tablet by mouth daily, Disp: , Rfl:     Omega-3 Fatty Acids (FISH OIL) 1,000 mg, Take 1,000 mg by mouth daily, Disp: , Rfl:     Testosterone 1 62 % GEL, Place 1 Act on the skin daily, Disp: 75 g, Rfl: 0    valACYclovir (VALTREX) 1,000 mg tablet, Take 2 tablets (2,000 mg total) by mouth 2 (two) times a day for 2 days (Patient taking differently: Take 2,000 mg by mouth 2 (two) times a day as needed ), Disp: 8 tablet, Rfl: 0      Mckayla Rubio    Scribe Attestation    I,:   Yessica Sterling am acting as a scribe while in the presence of the attending physician :        I,:   Lloyd Guy MD personally performed the services described in this documentation    as scribed in my presence :

## 2020-01-08 ENCOUNTER — HOSPITAL ENCOUNTER (OUTPATIENT)
Dept: RADIOLOGY | Facility: HOSPITAL | Age: 50
Discharge: HOME | End: 2020-01-08
Attending: ORTHOPAEDIC SURGERY
Payer: COMMERCIAL

## 2020-01-08 ENCOUNTER — HOSPITAL ENCOUNTER (OUTPATIENT)
Dept: MRI IMAGING | Facility: HOSPITAL | Age: 50
Discharge: HOME | End: 2020-01-08
Attending: ORTHOPAEDIC SURGERY
Payer: COMMERCIAL

## 2020-01-08 DIAGNOSIS — M25.512 LEFT SHOULDER PAIN, UNSPECIFIED CHRONICITY: ICD-10-CM

## 2020-01-08 PROCEDURE — 73222 MRI JOINT UPR EXTREM W/DYE: CPT

## 2020-01-08 PROCEDURE — 77002 NEEDLE LOCALIZATION BY XRAY: CPT

## 2020-01-08 PROCEDURE — 23350 INJECTION FOR SHOULDER X-RAY: CPT

## 2020-01-08 PROCEDURE — A9585 GADOBUTROL INJECTION: HCPCS | Performed by: ORTHOPAEDIC SURGERY

## 2020-01-08 RX ORDER — LIDOCAINE HYDROCHLORIDE 10 MG/ML
5 INJECTION, SOLUTION EPIDURAL; INFILTRATION; INTRACAUDAL; PERINEURAL
Status: DISCONTINUED | OUTPATIENT
Start: 2020-01-08 | End: 2020-01-09 | Stop reason: HOSPADM

## 2020-01-08 RX ADMIN — GADOBUTROL 0.2 ML: 604.72 INJECTION INTRAVENOUS at 13:30

## 2020-01-08 RX ADMIN — IOHEXOL 5 ML: 300 INJECTION, SOLUTION INTRAVENOUS at 14:16

## 2020-01-20 ENCOUNTER — APPOINTMENT (OUTPATIENT)
Dept: LAB | Facility: MEDICAL CENTER | Age: 50
End: 2020-01-20
Payer: COMMERCIAL

## 2020-01-20 DIAGNOSIS — N52.9 ERECTILE DYSFUNCTION, UNSPECIFIED ERECTILE DYSFUNCTION TYPE: ICD-10-CM

## 2020-01-20 DIAGNOSIS — R79.89 LOW TESTOSTERONE IN MALE: ICD-10-CM

## 2020-01-20 LAB
ALBUMIN SERPL BCP-MCNC: 4.3 G/DL (ref 3.5–5)
ALP SERPL-CCNC: 53 U/L (ref 46–116)
ALT SERPL W P-5'-P-CCNC: 62 U/L (ref 12–78)
ANION GAP SERPL CALCULATED.3IONS-SCNC: 5 MMOL/L (ref 4–13)
AST SERPL W P-5'-P-CCNC: 21 U/L (ref 5–45)
BASOPHILS # BLD AUTO: 0.04 THOUSANDS/ΜL (ref 0–0.1)
BASOPHILS NFR BLD AUTO: 1 % (ref 0–1)
BILIRUB SERPL-MCNC: 0.5 MG/DL (ref 0.2–1)
BUN SERPL-MCNC: 11 MG/DL (ref 5–25)
CALCIUM SERPL-MCNC: 10 MG/DL (ref 8.3–10.1)
CHLORIDE SERPL-SCNC: 106 MMOL/L (ref 100–108)
CO2 SERPL-SCNC: 27 MMOL/L (ref 21–32)
CREAT SERPL-MCNC: 1.06 MG/DL (ref 0.6–1.3)
EOSINOPHIL # BLD AUTO: 0.09 THOUSAND/ΜL (ref 0–0.61)
EOSINOPHIL NFR BLD AUTO: 2 % (ref 0–6)
ERYTHROCYTE [DISTWIDTH] IN BLOOD BY AUTOMATED COUNT: 12.2 % (ref 11.6–15.1)
GFR SERPL CREATININE-BSD FRML MDRD: 83 ML/MIN/1.73SQ M
GLUCOSE P FAST SERPL-MCNC: 121 MG/DL (ref 65–99)
HCT VFR BLD AUTO: 48.9 % (ref 36.5–49.3)
HGB BLD-MCNC: 15.8 G/DL (ref 12–17)
IMM GRANULOCYTES # BLD AUTO: 0.01 THOUSAND/UL (ref 0–0.2)
IMM GRANULOCYTES NFR BLD AUTO: 0 % (ref 0–2)
LYMPHOCYTES # BLD AUTO: 2.07 THOUSANDS/ΜL (ref 0.6–4.47)
LYMPHOCYTES NFR BLD AUTO: 37 % (ref 14–44)
MCH RBC QN AUTO: 30.7 PG (ref 26.8–34.3)
MCHC RBC AUTO-ENTMCNC: 32.3 G/DL (ref 31.4–37.4)
MCV RBC AUTO: 95 FL (ref 82–98)
MONOCYTES # BLD AUTO: 0.71 THOUSAND/ΜL (ref 0.17–1.22)
MONOCYTES NFR BLD AUTO: 13 % (ref 4–12)
NEUTROPHILS # BLD AUTO: 2.7 THOUSANDS/ΜL (ref 1.85–7.62)
NEUTS SEG NFR BLD AUTO: 47 % (ref 43–75)
NRBC BLD AUTO-RTO: 0 /100 WBCS
PLATELET # BLD AUTO: 333 THOUSANDS/UL (ref 149–390)
PMV BLD AUTO: 9.9 FL (ref 8.9–12.7)
POTASSIUM SERPL-SCNC: 4.5 MMOL/L (ref 3.5–5.3)
PROT SERPL-MCNC: 7.9 G/DL (ref 6.4–8.2)
RBC # BLD AUTO: 5.14 MILLION/UL (ref 3.88–5.62)
SODIUM SERPL-SCNC: 138 MMOL/L (ref 136–145)
WBC # BLD AUTO: 5.62 THOUSAND/UL (ref 4.31–10.16)

## 2020-01-20 PROCEDURE — 84402 ASSAY OF FREE TESTOSTERONE: CPT

## 2020-01-20 PROCEDURE — 84403 ASSAY OF TOTAL TESTOSTERONE: CPT

## 2020-01-20 PROCEDURE — 36415 COLL VENOUS BLD VENIPUNCTURE: CPT

## 2020-01-20 PROCEDURE — 84154 ASSAY OF PSA FREE: CPT

## 2020-01-20 PROCEDURE — 85025 COMPLETE CBC W/AUTO DIFF WBC: CPT

## 2020-01-20 PROCEDURE — 80053 COMPREHEN METABOLIC PANEL: CPT

## 2020-01-20 PROCEDURE — 84153 ASSAY OF PSA TOTAL: CPT

## 2020-01-21 ENCOUNTER — OFFICE VISIT (OUTPATIENT)
Dept: OBGYN CLINIC | Facility: MEDICAL CENTER | Age: 50
End: 2020-01-21
Payer: COMMERCIAL

## 2020-01-21 VITALS
SYSTOLIC BLOOD PRESSURE: 114 MMHG | WEIGHT: 200 LBS | DIASTOLIC BLOOD PRESSURE: 78 MMHG | HEIGHT: 71 IN | HEART RATE: 91 BPM | BODY MASS INDEX: 28 KG/M2

## 2020-01-21 DIAGNOSIS — S43.432D SUPERIOR GLENOID LABRUM LESION OF LEFT SHOULDER, SUBSEQUENT ENCOUNTER: Primary | ICD-10-CM

## 2020-01-21 LAB
PSA FREE MFR SERPL: 10 %
PSA FREE SERPL-MCNC: 0.18 NG/ML
PSA SERPL-MCNC: 1.8 NG/ML (ref 0–4)
TESTOST FREE SERPL-MCNC: 11.7 PG/ML (ref 6.8–21.5)
TESTOST SERPL-MCNC: 195 NG/DL (ref 264–916)

## 2020-01-21 PROCEDURE — 99214 OFFICE O/P EST MOD 30 MIN: CPT | Performed by: ORTHOPAEDIC SURGERY

## 2020-01-21 RX ORDER — CEFAZOLIN SODIUM 1 G/50ML
1000 SOLUTION INTRAVENOUS ONCE
Status: CANCELLED | OUTPATIENT
Start: 2020-02-10 | End: 2020-01-21

## 2020-01-21 NOTE — H&P (VIEW-ONLY)
Orthopaedic Surgery - Office Note  Karly Schumacher (48 y o  male)   : 1970   MRN: [id number]  Encounter Date: 2020    Chief Complaint   Patient presents with    Left Shoulder - Follow-up       Assessment / Plan  Left shoulder posterior labral tear     · The diagnosis and treatment options were reviewed  · The patient wishes to proceed with left shoulder arthroscopy and posterior labral repair   · The risks, benefits, and alternatives were discussed  · Written consent was obtained  · Patient was not fit with his post op sling today  He will call the office to coordinate this  He will need a post op sling Arc 2 0 ER metal brace "gunslinger" and the polar cube (not pillow sling!)  Return for Recheck after sx  History of Present Illness  Karly Schumacher is a 54 y o  male who presents asa follow up for left shoulder pain  Patient has a surgical history of a biceps tenodesis on 18 with Dr Dilma Garsia for his SLAP tear  Patient has a hx of a left bicipital groove steroid injection on 19 that significantly improved his symptoms however she was still having posterior shoulder pain  Patient then also had a left [facility] jt on 12/10/19 which also gave him 50% relief  Patient continues to complain of posterior shoulder pain  He has been working out at [employer] and has been doing activity modification  He describe his pain as mild to moderate sharp achy posterior shoulder pain with certain movements as well as clicking and catching to his left shoulder with ROM  He states that he did dislocate this shoulder 20+ years ago  He denies numbness and tingling to LUE  Review of Systems  Pertinent items are noted in HPI  All other systems were reviewed and are negative  Physical Exam  /78   Pulse 91   Ht 5' 11" (1 803 m)   Wt 90 7 kg (200 lb)   BMI 27 89 kg/m²   Cons: Appears well  No apparent distress  Psych: Alert  Oriented x3  Mood and affect normal   Eyes: PERRLA, EOMI  Resp: Normal effort    No audible wheezing or stridor  CV: Palpable pulse  No discernable arrhythmia  No LE edema  Lymph:  No palpable cervical, axillary, or inguinal lymphadenopathy  Skin: Warm  No palpable masses  No visible lesions  Neuro: Normal muscle tone  Normal and symmetric DTR's  Left Shoulder Exam  Alignment / Posture:  Normal shoulder posture  Inspection:  No swelling  No erythema  Palpation:  posterior shoulder and bicipital groove  tenderness  ROM:  Shoulder   Shoulder ER 60  Shoulder IR T4   Strength:  5/5 supraspinatus, infraspinatus, and subscapularis  Stability:  No objective shoulder instability  Tests: (+) Karen  (+) Mariely Joe  Neurovascular:  Sensation intact in Ax/R/M/U nerve distributions  2+ radial pulse  Studies Reviewed  I have personally reviewed pertinent films in PACS and my interpretation is MRI arthrogram of left shoulder on 1/8/2020 demonstrates a extended SLAP tear   Procedures  No procedures today  Medical, Surgical, Family, and Social History  The patient's medical history, family history, and social history, were reviewed and updated as appropriate      Past Medical History:   Diagnosis Date    Anxiety     Hyperlipidemia     borderline    Impaired fasting glucose     Olecranon bursitis, left elbow     Shoulder arthritis     left    Vitamin D deficiency        Past Surgical History:   Procedure Laterality Date    ARTHROSCOPIC BICEPS TENODESIS Left 2/8/2018    Procedure: SHOULDER ARTHROSCOPY WITH BICEPS TENODESIS;  Surgeon: Ryan Genao MD;  Location: [address] [place] [address];  Service: Orthopedics    FL INJECTION LEFT SHOULDER (ARTHROGRAM)  1/8/2020    MD REMOVAL OF ELBOW BURSA Left 10/3/2019    Procedure: EXCISION BURSA OLECRANON ELBOW;  Surgeon: Ruth Mason MD;  Location: [address] [place] [address];  Service: Orthopedics    ROTATOR CUFF REPAIR      ROTATOR CUFF REPAIR Left     x2 last one 2005    SEPTOPLASTY         Family History   Problem Relation Age of Onset    No Known Problems Mother     No Known Problems Father        Social History     Occupational History    Not on file   Tobacco Use    Smoking status: Former Smoker     Years: 15 00     Last attempt to quit: 2007     Years since quittin 9    Smokeless tobacco: Never Used   Substance and Sexual Activity    Alcohol use: Yes     Comment: socially    Drug use: No    Sexual activity: Not on file       No Known Allergies      Current Outpatient Medications:     acyclovir (ZOVIRAX) 5 % ointment, Apply topically every 4 (four) hours, Disp: 15 g, Rfl: 0    Cholecalciferol (VITAMIN D) 2000 units CAPS, Take by mouth, Disp: , Rfl:     glucosamine-chondroitin 500-400 MG tablet, Take 1 tablet by mouth every morning, Disp: , Rfl:     ibuprofen (MOTRIN) 800 mg tablet, Take 1 tablet (800 mg total) by mouth every 8 (eight) hours as needed for mild pain, Disp: 20 tablet, Rfl: 0    Multiple Vitamin (MULTIVITAMIN) tablet, Take 1 tablet by mouth daily, Disp: , Rfl:     Omega-3 Fatty Acids (FISH OIL) 1,000 mg, Take 1,000 mg by mouth daily, Disp: , Rfl:     Testosterone 1 62 % GEL, Place 1 Act on the skin daily, Disp: 75 g, Rfl: 0    buPROPion (WELLBUTRIN XL) 300 mg 24 hr tablet, Take 1 tablet (300 mg total) by mouth daily for 90 days, Disp: 90 tablet, Rfl: 3    valACYclovir (VALTREX) 1,000 mg tablet, Take 2 tablets (2,000 mg total) by mouth 2 (two) times a day for 2 days (Patient taking differently: Take 2,000 mg by mouth 2 (two) times a day as needed ), Disp: 8 tablet, Rfl: 0    Scribe Attestation    I,:   Annie Momin am acting as a scribe while in the presence of the attending physician :        I,:   Tari Saini MD personally performed the services described in this documentation    as scribed in my presence :

## 2020-01-21 NOTE — PROGRESS NOTES
Orthopaedic Surgery - Office Note  Zaki Disla (40 y o  male)   : 1970   MRN: [id number]  Encounter Date: 2020    Chief Complaint   Patient presents with    Left Shoulder - Follow-up       Assessment / Plan  Left shoulder posterior labral tear     · The diagnosis and treatment options were reviewed  · The patient wishes to proceed with left shoulder arthroscopy and posterior labral repair   · The risks, benefits, and alternatives were discussed  · Written consent was obtained  · Patient was not fit with his post op sling today  He will call the office to coordinate this  He will need a post op sling Arc 2 0 ER metal brace "gunslinger" and the polar cube (not pillow sling!)  Return for Recheck after sx  History of Present Illness  Zaki Disla is a 54 y o  male who presents asa follow up for left shoulder pain  Patient has a surgical history of a biceps tenodesis on 18 with Dr Ciera Parra for his SLAP tear  Patient has a hx of a left bicipital groove steroid injection on 19 that significantly improved his symptoms however she was still having posterior shoulder pain  Patient then also had a left [address] jt on 12/10/19 which also gave him 50% relief  Patient continues to complain of posterior shoulder pain  He has been working out at [employer] and has been doing activity modification  He describe his pain as mild to moderate sharp achy posterior shoulder pain with certain movements as well as clicking and catching to his left shoulder with ROM  He states that he did dislocate this shoulder 20+ years ago  He denies numbness and tingling to LUE  Review of Systems  Pertinent items are noted in HPI  All other systems were reviewed and are negative  Physical Exam  /78   Pulse 91   Ht 5' 11" (1 803 m)   Wt 90 7 kg (200 lb)   BMI 27 89 kg/m²   Cons: Appears well  No apparent distress  Psych: Alert  Oriented x3  Mood and affect normal   Eyes: PERRLA, EOMI  Resp: Normal effort    No audible wheezing or stridor  CV: Palpable pulse  No discernable arrhythmia  No LE edema  Lymph:  No palpable cervical, axillary, or inguinal lymphadenopathy  Skin: Warm  No palpable masses  No visible lesions  Neuro: Normal muscle tone  Normal and symmetric DTR's  Left Shoulder Exam  Alignment / Posture:  Normal shoulder posture  Inspection:  No swelling  No erythema  Palpation:  posterior shoulder and bicipital groove  tenderness  ROM:  Shoulder   Shoulder ER 60  Shoulder IR T4   Strength:  5/5 supraspinatus, infraspinatus, and subscapularis  Stability:  No objective shoulder instability  Tests: (+) Jones  (+) Clementinellneva Kelsey  Neurovascular:  Sensation intact in Ax/R/M/U nerve distributions  2+ radial pulse  Studies Reviewed  I have personally reviewed pertinent films in PACS and my interpretation is MRI arthrogram of left shoulder on 1/8/2020 demonstrates a extended SLAP tear   Procedures  No procedures today  Medical, Surgical, Family, and Social History  The patient's medical history, family history, and social history, were reviewed and updated as appropriate      Past Medical History:   Diagnosis Date    Anxiety     Hyperlipidemia     borderline    Impaired fasting glucose     Olecranon bursitis, left elbow     Shoulder arthritis     left    Vitamin D deficiency        Past Surgical History:   Procedure Laterality Date    ARTHROSCOPIC BICEPS TENODESIS Left 2/8/2018    Procedure: SHOULDER ARTHROSCOPY WITH BICEPS TENODESIS;  Surgeon: Lady Eduardo MD;  Location: [address] [place] [address];  Service: Orthopedics    FL INJECTION LEFT SHOULDER (ARTHROGRAM)  1/8/2020    UT REMOVAL OF ELBOW BURSA Left 10/3/2019    Procedure: EXCISION BURSA OLECRANON ELBOW;  Surgeon: Jon Valdez MD;  Location: [facility] MAIN OR;  Service: Orthopedics    ROTATOR CUFF REPAIR      ROTATOR CUFF REPAIR Left     x2 last one 2005    SEPTOPLASTY         Family History   Problem Relation Age of Onset    No Known Problems Mother     No Known Problems Father        Social History     Occupational History    Not on file   Tobacco Use    Smoking status: Former Smoker     Years: 15 00     Last attempt to quit: 2007     Years since quittin 9    Smokeless tobacco: Never Used   Substance and Sexual Activity    Alcohol use: Yes     Comment: socially    Drug use: No    Sexual activity: Not on file       No Known Allergies      Current Outpatient Medications:     acyclovir (ZOVIRAX) 5 % ointment, Apply topically every 4 (four) hours, Disp: 15 g, Rfl: 0    Cholecalciferol (VITAMIN D) 2000 units CAPS, Take by mouth, Disp: , Rfl:     glucosamine-chondroitin 500-400 MG tablet, Take 1 tablet by mouth every morning, Disp: , Rfl:     ibuprofen (MOTRIN) 800 mg tablet, Take 1 tablet (800 mg total) by mouth every 8 (eight) hours as needed for mild pain, Disp: 20 tablet, Rfl: 0    Multiple Vitamin (MULTIVITAMIN) tablet, Take 1 tablet by mouth daily, Disp: , Rfl:     Omega-3 Fatty Acids (FISH OIL) 1,000 mg, Take 1,000 mg by mouth daily, Disp: , Rfl:     Testosterone 1 62 % GEL, Place 1 Act on the skin daily, Disp: 75 g, Rfl: 0    buPROPion (WELLBUTRIN XL) 300 mg 24 hr tablet, Take 1 tablet (300 mg total) by mouth daily for 90 days, Disp: 90 tablet, Rfl: 3    valACYclovir (VALTREX) 1,000 mg tablet, Take 2 tablets (2,000 mg total) by mouth 2 (two) times a day for 2 days (Patient taking differently: Take 2,000 mg by mouth 2 (two) times a day as needed ), Disp: 8 tablet, Rfl: 0    Scribe Attestation    I,:   Aguilar Wilde am acting as a scribe while in the presence of the attending physician :        I,:   Sangita Hardy MD personally performed the services described in this documentation    as scribed in my presence :

## 2020-01-22 ENCOUNTER — TELEPHONE (OUTPATIENT)
Dept: OBGYN CLINIC | Facility: MEDICAL CENTER | Age: 50
End: 2020-01-22

## 2020-01-22 NOTE — TELEPHONE ENCOUNTER
Patient called to inquire about post op sling and cold therapy unit cost  I spoke with DME associate and she will call him later since his plan does not cover cold unit   She will explain to him rental cost

## 2020-01-23 ENCOUNTER — OFFICE VISIT (OUTPATIENT)
Dept: FAMILY MEDICINE CLINIC | Facility: CLINIC | Age: 50
End: 2020-01-23
Payer: COMMERCIAL

## 2020-01-23 VITALS
HEART RATE: 92 BPM | WEIGHT: 210.4 LBS | TEMPERATURE: 97.9 F | SYSTOLIC BLOOD PRESSURE: 128 MMHG | DIASTOLIC BLOOD PRESSURE: 86 MMHG | OXYGEN SATURATION: 98 % | RESPIRATION RATE: 16 BRPM | BODY MASS INDEX: 30.12 KG/M2 | HEIGHT: 70 IN

## 2020-01-23 DIAGNOSIS — Z01.818 PREOP EXAMINATION: Primary | ICD-10-CM

## 2020-01-23 DIAGNOSIS — F41.8 DEPRESSION WITH ANXIETY: ICD-10-CM

## 2020-01-23 DIAGNOSIS — B00.1 COLD SORE: ICD-10-CM

## 2020-01-23 DIAGNOSIS — R73.01 IFG (IMPAIRED FASTING GLUCOSE): ICD-10-CM

## 2020-01-23 DIAGNOSIS — R79.89 LOW TESTOSTERONE IN MALE: ICD-10-CM

## 2020-01-23 DIAGNOSIS — E78.2 MIXED HYPERLIPIDEMIA: ICD-10-CM

## 2020-01-23 DIAGNOSIS — S43.432D SUPERIOR GLENOID LABRUM LESION OF LEFT SHOULDER, SUBSEQUENT ENCOUNTER: ICD-10-CM

## 2020-01-23 DIAGNOSIS — N52.9 ERECTILE DYSFUNCTION, UNSPECIFIED ERECTILE DYSFUNCTION TYPE: ICD-10-CM

## 2020-01-23 DIAGNOSIS — E66.9 OBESITY (BMI 30-39.9): ICD-10-CM

## 2020-01-23 PROCEDURE — 99243 OFF/OP CNSLTJ NEW/EST LOW 30: CPT | Performed by: FAMILY MEDICINE

## 2020-01-23 RX ORDER — VALACYCLOVIR HYDROCHLORIDE 1 G/1
2000 TABLET, FILM COATED ORAL 2 TIMES DAILY PRN
Qty: 30 TABLET | Refills: 1 | Status: SHIPPED | OUTPATIENT
Start: 2020-01-23 | End: 2021-10-28 | Stop reason: ALTCHOICE

## 2020-01-23 RX ORDER — ACYCLOVIR 50 MG/G
OINTMENT TOPICAL
Qty: 15 G | Refills: 0 | Status: SHIPPED | OUTPATIENT
Start: 2020-01-23 | End: 2021-10-28 | Stop reason: ALTCHOICE

## 2020-01-23 RX ORDER — TESTOSTERONE 20.25 MG/1.25G
2 GEL TOPICAL DAILY
Qty: 75 G | Refills: 0 | Status: SHIPPED | OUTPATIENT
Start: 2020-01-23 | End: 2020-04-06

## 2020-01-23 RX ORDER — BUPROPION HYDROCHLORIDE 300 MG/1
300 TABLET ORAL DAILY
Qty: 90 TABLET | Refills: 3 | Status: SHIPPED | OUTPATIENT
Start: 2020-01-23 | End: 2020-10-08 | Stop reason: SDUPTHER

## 2020-01-23 RX ORDER — TESTOSTERONE 20.25 MG/1.25G
2 GEL TOPICAL DAILY
Qty: 75 G | Refills: 0
Start: 2020-01-23 | End: 2020-01-23 | Stop reason: SDUPTHER

## 2020-01-23 NOTE — PROGRESS NOTES
Chief Complaint   Patient presents with    Pre-op Clearance     Left shoulder surgery scheduled on 02/10/20 with Dr Crys Mooney  Mendel Gore was seen today for pre-op clearance  Diagnoses and all orders for this visit:    Preop examination    Superior glenoid labrum lesion of left shoulder, subsequent encounter    Low testosterone in male  -     Discontinue: Testosterone 1 62 % GEL; Place 2 Act on the skin daily  -     CBC and differential; Future  -     Testosterone, free, total; Future  -     PSA, Total Screen; Future  -     Testosterone 1 62 % GEL; Place 2 Act on the skin daily    Erectile dysfunction, unspecified erectile dysfunction type  -     Discontinue: Testosterone 1 62 % GEL; Place 2 Act on the skin daily  -     Testosterone 1 62 % GEL; Place 2 Act on the skin daily    Mixed hyperlipidemia  -     Comprehensive metabolic panel; Future  -     Lipid Panel with Direct LDL reflex; Future    IFG (impaired fasting glucose)  -     Hemoglobin A1C With EAG; Future    Cold sore  -     acyclovir (ZOVIRAX) 5 % ointment; Apply topically every 4 (four) hours  -     valACYclovir (VALTREX) 1,000 mg tablet; Take 2 tablets (2,000 mg total) by mouth 2 (two) times a day as needed (cold sore) for up to 1 day    Depression with anxiety  -     buPROPion (WELLBUTRIN XL) 300 mg 24 hr tablet; Take 1 tablet (300 mg total) by mouth daily    Obesity (BMI 30-39  9)    BMI Counseling: Body mass index is 30 63 kg/m²  The BMI is above normal  Nutrition recommendations include reducing portion sizes, decreasing overall calorie intake and 3-5 servings of fruits/vegetables daily  Exercise recommendations include moderate aerobic physical activity for 150 minutes/week  Subjective:     Leila Gutierrez is a 54 y o  male who presents to the office today for a preoperative consultation at the request of surgeon Dr Crys Mooney who plans on performing left shoulder surgery on 2/10/2019  This consultation is requested for the preop   Planned anesthesia: general  The patient has the following known anesthesia issues: no problem  Patients bleeding risk: no recent abnormal bleeding  Patient does not have objections to receiving blood products if needed  Low testosterone----1/2020 testosterone 195 still low  Pt states he is on testosterone gel 1 act daily      Hyperlipidemia---Pt tried to follow low fat diet and he is on OTC fish oil 3g per day  Denies hx of stroke or MI      IFG---1/2020 fasting glucose 121 elevated  Cold sore---Use valtrex and acyclovir ointment as needed       Depression/anxiety----for years  He is on wellbutrin 300mg daily  Feels it is working  Denies suicidal ideation  Mother had depression/anxiety/?bipolar  Cousins had depression/anxiety per pt       No smoking    Alcohol 1 glass of wine daily    No drugs        The following portions of the patient's history were reviewed and updated as appropriate: allergies, current medications, past family history, past medical history, past social history, past surgical history and problem list     Review of Systems  Pertinent items are noted in HPI       Objective:     /86 (BP Location: Left arm, Patient Position: Sitting, Cuff Size: Adult)   Pulse 92   Temp 97 9 °F (36 6 °C) (Tympanic)   Resp 16   Ht 5' 9 5" (1 765 m)   Wt 95 4 kg (210 lb 6 4 oz)   SpO2 98%   BMI 30 63 kg/m²     General Appearance:    Alert, cooperative, no distress, appears stated age   Head:    Normocephalic, without obvious abnormality, atraumatic   Eyes:    PERRL, conjunctiva/corneas clear, EOM's intact, fundi     benign, both eyes        Ears:    Normal TM's and external ear canals, both ears   Nose:   Nares normal, septum midline, mucosa normal, no drainage    or sinus tenderness   Throat:   Lips, mucosa, and tongue normal; teeth and gums normal   Neck:   Supple, symmetrical, trachea midline, no adenopathy;        thyroid:  No enlargement/tenderness/nodules; no carotid    bruit or JVD       Lungs: Clear to auscultation bilaterally, respirations unlabored       Heart:    Regular rate and rhythm, S1 and S2 normal, no murmur, rub   or gallop   Abdomen:     Soft, non-tender, bowel sounds active all four quadrants,     no masses, no organomegaly           Extremities:   Extremities normal, atraumatic, no cyanosis or edema                       Predictors of intubation difficulty:   Morbid obesity? no   Anatomically abnormal facies?  no   Prominent incisors? no   Receding mandible? no   Short, thick neck? no   Neck range of motion: normal      Cardiographics  ECG: no prior ECG  Echocardiogram: not done    Imaging  Chest x-ray: n/a     Lab Review   Appointment on 01/20/2020   Component Date Value    WBC 01/20/2020 5 62     RBC 01/20/2020 5 14     Hemoglobin 01/20/2020 15 8     Hematocrit 01/20/2020 48 9     MCV 01/20/2020 95     MCH 01/20/2020 30 7     MCHC 01/20/2020 32 3     RDW 01/20/2020 12 2     MPV 01/20/2020 9 9     Platelets 45/80/3366 333     nRBC 01/20/2020 0     Neutrophils Relative 01/20/2020 47     Immat GRANS % 01/20/2020 0     Lymphocytes Relative 01/20/2020 37     Monocytes Relative 01/20/2020 13*    Eosinophils Relative 01/20/2020 2     Basophils Relative 01/20/2020 1     Neutrophils Absolute 01/20/2020 2 70     Immature Grans Absolute 01/20/2020 0 01     Lymphocytes Absolute 01/20/2020 2 07     Monocytes Absolute 01/20/2020 0 71     Eosinophils Absolute 01/20/2020 0 09     Basophils Absolute 01/20/2020 0 04     Prostate Specific Antige* 01/20/2020 1 8     PSA, Free 01/20/2020 0 18     PSA, Free Pct 01/20/2020 10 0     Testosterone, Free 01/20/2020 11 7     TESTOSTERONE TOTAL 01/20/2020 195*    Sodium 01/20/2020 138     Potassium 01/20/2020 4 5     Chloride 01/20/2020 106     CO2 01/20/2020 27     ANION GAP 01/20/2020 5     BUN 01/20/2020 11     Creatinine 01/20/2020 1 06     Glucose, Fasting 01/20/2020 121*    Calcium 01/20/2020 10 0     AST 01/20/2020 21  ALT 01/20/2020 62     Alkaline Phosphatase 01/20/2020 53     Total Protein 01/20/2020 7 9     Albumin 01/20/2020 4 3     Total Bilirubin 01/20/2020 0 50     eGFR 01/20/2020 83    Appointment on 12/26/2019   Component Date Value    Testosterone, Free 12/26/2019 10 4     TESTOSTERONE TOTAL 12/26/2019 223*        Assessment:     54 y o  male with planned surgery as above  Known risk factors for perioperative complications: None    Difficulty with intubation is not anticipated  Cardiac Risk Estimation: Low    Current medications which may produce withdrawal symptoms if withheld perioperatively: no    Plan:     1  Preoperative workup as follows none  2  Change in medication regimen before surgery: none, continue medication regimen including morning of surgery, with sip of water  3  Prophylaxis for cardiac events with perioperative beta-blockers: not indicated  4  Invasive hemodynamic monitoring perioperatively: not indicated  5  Deep vein thrombosis prophylaxis postoperatively:regimen to be chosen by surgical team   6  Surveillance for postoperative MI with ECG immediately postoperatively and on postoperative days 1 and 2 AND troponin levels 24 hours postoperatively and on day 4 or hospital discharge (whichever comes first): not indicated

## 2020-01-23 NOTE — PROGRESS NOTES
[facility] [facility]    NAME: Suzette Moran  AGE: 54 y o  SEX: male  : 1970     DATE: 2020  Chief Complaint   Patient presents with    Physical Exam     Preventative   Follow-up     3 months and review labs   Pre-op Clearance     Left shoulder surgery scheduled on 02/10/20 with Dr Alla Rm  Health Maintenance   Topic Date Due    HIV Screening  1987    Annual Physical  1990    BMI: Followup Plan  12/10/2019    BMI: Adult  2021    DTaP,Tdap,and Td Vaccines (2 - Td) 10/29/2026    Pneumococcal Vaccine: 65+ Years (1 of 2 - PCV13) 2037    Influenza Vaccine  Completed    Pneumococcal Vaccine: Pediatrics (0 to 5 Years) and At-Risk Patients (6 to 59 Years)  Aged Out    HIB Vaccine  Aged Out    Hepatitis B Vaccine  Aged Out    IPV Vaccine  Aged Out    Hepatitis A Vaccine  Aged Out    Meningococcal ACWY Vaccine  Aged Out    HPV Vaccine  Aged Out     BMI Counseling: Body mass index is 30 63 kg/m²  The BMI {VB BMI Counselin}      Assessment and Plan:     Problem List Items Addressed This Visit     None          Immunizations and preventive care screenings were discussed with patient today  Appropriate education was printed on patient's after visit summary  Counseling:  · {Annual Physical; Counselin}         No follow-ups on file  Chief Complaint:     Chief Complaint   Patient presents with    Physical Exam     Preventative   Follow-up     3 months and review labs   Pre-op Clearance     Left shoulder surgery scheduled on 02/10/20 with Dr Alla Rm  History of Present Illness:     Adult Annual Physical   Patient here for a comprehensive physical exam  The patient reports {problems:60933}  Diet and Physical Activity  · Diet/Nutrition: {annual physical; diet:54848827}  · Exercise: {annual physical; exercise:49777415}        Depression Screening  PHQ-9 Depression Screening    PHQ-9:    Frequency of the following problems over the past two weeks:            General Health  · Sleep: {annual physical; sleep:2102}  · Hearing: {annual physical; hearin}  · Vision: {annual physical; vision:}  · Dental: {annual physical; dental:}          Health  · Symptoms include: {annual physical; urinary symptoms:71629::"none"}     Review of Systems:     Review of Systems   Past Medical History:     Past Medical History:   Diagnosis Date    Anxiety     Hyperlipidemia     borderline    Impaired fasting glucose     Olecranon bursitis, left elbow     Shoulder arthritis     left    Vitamin D deficiency       Past Surgical History:     Past Surgical History:   Procedure Laterality Date    ARTHROSCOPIC BICEPS TENODESIS Left 2018    Procedure: SHOULDER ARTHROSCOPY WITH BICEPS TENODESIS;  Surgeon: Brayan Dukes MD;  Location: [address] [place] [address];  Service: Orthopedics    FL INJECTION LEFT SHOULDER (ARTHROGRAM)  2020    CA REMOVAL OF ELBOW BURSA Left 10/3/2019    Procedure: EXCISION BURSA OLECRANON ELBOW;  Surgeon: Trice Kenney MD;  Location: [address] [place] [address];  Service: Orthopedics    ROTATOR CUFF REPAIR      ROTATOR CUFF REPAIR Left     x2 last one     SEPTOPLASTY        Family History:     Family History   Problem Relation Age of Onset    No Known Problems Mother     No Known Problems Father       Social History:     Social History     Socioeconomic History    Marital status: Single     Spouse name: Not on file    Number of children: Not on file    Years of education: Not on file    Highest education level: Not on file   Occupational History    Not on file   Social Needs    Financial resource strain: Not on file    Food insecurity:     Worry: Not on file     Inability: Not on file    Transportation needs:     Medical: Not on file     Non-medical: Not on file   Tobacco Use    Smoking status: Former Smoker     Years: 15 00     Last attempt to quit: 2007     Years since quittin 9    Smokeless tobacco: Never Used   Substance and Sexual Activity    Alcohol use: Yes     Comment: socially    Drug use: No    Sexual activity: Not on file   Lifestyle    Physical activity:     Days per week: Not on file     Minutes per session: Not on file    Stress: Not on file   Relationships    Social connections:     Talks on phone: Not on file     Gets together: Not on file     Attends Religion service: Not on file     Active member of club or organization: Not on file     Attends meetings of clubs or organizations: Not on file     Relationship status: Not on file    Intimate partner violence:     Fear of current or ex partner: Not on file     Emotionally abused: Not on file     Physically abused: Not on file     Forced sexual activity: Not on file   Other Topics Concern    Not on file   Social History Narrative    Not on file      Current Medications:     Current Outpatient Medications   Medication Sig Dispense Refill    acyclovir (ZOVIRAX) 5 % ointment Apply topically every 4 (four) hours 15 g 0    buPROPion (WELLBUTRIN XL) 300 mg 24 hr tablet Take 1 tablet (300 mg total) by mouth daily for 90 days 90 tablet 3    Cholecalciferol (VITAMIN D) 2000 units CAPS Take by mouth      glucosamine-chondroitin 500-400 MG tablet Take 1 tablet by mouth every morning      ibuprofen (MOTRIN) 800 mg tablet Take 1 tablet (800 mg total) by mouth every 8 (eight) hours as needed for mild pain 20 tablet 0    Multiple Vitamin (MULTIVITAMIN) tablet Take 1 tablet by mouth daily      Omega-3 Fatty Acids (FISH OIL) 1,000 mg Take 1,000 mg by mouth daily      Testosterone 1 62 % GEL Place 1 Act on the skin daily 75 g 0    valACYclovir (VALTREX) 1,000 mg tablet Take 2 tablets (2,000 mg total) by mouth 2 (two) times a day for 2 days (Patient taking differently: Take 2,000 mg by mouth 2 (two) times a day as needed ) 8 tablet 0     No current facility-administered medications for this visit         Allergies:     No Known Allergies   Physical Exam:     /86 (BP Location: Left arm, Patient Position: Sitting, Cuff Size: Adult)   Pulse 92   Temp 97 9 °F (36 6 °C) (Tympanic)   Resp 16   Ht 5' 9 5" (1 765 m)   Wt 95 4 kg (210 lb 6 4 oz)   SpO2 98%   BMI 30 63 kg/m²     Physical Exam    Sofia Cee, [address] [place]

## 2020-01-23 NOTE — ASSESSMENT & PLAN NOTE
1/2020 testosterone 195 still low  Advised pt to use testosterone gel 1 62% 2 act daily  SE educated pt again   Will monitor CBC, PSA

## 2020-01-23 NOTE — LETTER
January 23, 2020     Lloyd Guy MD  [facility] [place] [address]    Patient: Manuela Billings   YOB: 1970   Date of Visit: 1/23/2020       Dear Dr Liliane Hilliard:    Thank you for referring Manuela Billings to me for evaluation  Below are my notes for this consultation  If you have questions, please do not hesitate to call me  I look forward to following your patient along with you  Sincerely,        Marily Montes MD        CC: No Recipients  Marily Montes MD  1/23/2020 10:35 AM  Sign at close encounter  Chief Complaint   Patient presents with    Pre-op Clearance     Left shoulder surgery scheduled on 02/10/20 with Dr Liliane Hilliard Peyton Arts was seen today for pre-op clearance  Diagnoses and all orders for this visit:    Preop examination    Superior glenoid labrum lesion of left shoulder, subsequent encounter    Low testosterone in male  -     Discontinue: Testosterone 1 62 % GEL; Place 2 Act on the skin daily  -     CBC and differential; Future  -     Testosterone, free, total; Future  -     PSA, Total Screen; Future  -     Testosterone 1 62 % GEL; Place 2 Act on the skin daily    Erectile dysfunction, unspecified erectile dysfunction type  -     Discontinue: Testosterone 1 62 % GEL; Place 2 Act on the skin daily  -     Testosterone 1 62 % GEL; Place 2 Act on the skin daily    Mixed hyperlipidemia  -     Comprehensive metabolic panel; Future  -     Lipid Panel with Direct LDL reflex; Future    IFG (impaired fasting glucose)  -     Hemoglobin A1C With EAG; Future    Cold sore  -     acyclovir (ZOVIRAX) 5 % ointment; Apply topically every 4 (four) hours  -     valACYclovir (VALTREX) 1,000 mg tablet; Take 2 tablets (2,000 mg total) by mouth 2 (two) times a day as needed (cold sore) for up to 1 day    Depression with anxiety  -     buPROPion (WELLBUTRIN XL) 300 mg 24 hr tablet; Take 1 tablet (300 mg total) by mouth daily    Obesity (BMI 30-39  9)    BMI Counseling:  Body mass index is 30 63 kg/m²  The BMI is above normal  Nutrition recommendations include reducing portion sizes, decreasing overall calorie intake and 3-5 servings of fruits/vegetables daily  Exercise recommendations include moderate aerobic physical activity for 150 minutes/week  Subjective:     Chilo Lane is a 54 y o  male who presents to the office today for a preoperative consultation at the request of surgeon Dr Maureen Bales who plans on performing left shoulder surgery on 2/10/2019  This consultation is requested for the preop  Planned anesthesia: general  The patient has the following known anesthesia issues: no problem  Patients bleeding risk: no recent abnormal bleeding  Patient does not have objections to receiving blood products if needed  Low testosterone----1/2020 testosterone 195 still low  Pt states he is on testosterone gel 1 act daily      Hyperlipidemia---Pt tried to follow low fat diet and he is on OTC fish oil 3g per day  Denies hx of stroke or MI      IFG---1/2020 fasting glucose 121 elevated  Cold sore---Use valtrex and acyclovir ointment as needed       Depression/anxiety----for years  He is on wellbutrin 300mg daily  Feels it is working  Denies suicidal ideation  Mother had depression/anxiety/?bipolar  Cousins had depression/anxiety per pt       No smoking    Alcohol 1 glass of wine daily    No drugs        The following portions of the patient's history were reviewed and updated as appropriate: allergies, current medications, past family history, past medical history, past social history, past surgical history and problem list     Review of Systems  Pertinent items are noted in HPI       Objective:     /86 (BP Location: Left arm, Patient Position: Sitting, Cuff Size: Adult)   Pulse 92   Temp 97 9 °F (36 6 °C) (Tympanic)   Resp 16   Ht 5' 9 5" (1 765 m)   Wt 95 4 kg (210 lb 6 4 oz)   SpO2 98%   BMI 30 63 kg/m²      General Appearance:    Alert, cooperative, no distress, appears stated age   Head:    Normocephalic, without obvious abnormality, atraumatic   Eyes:    PERRL, conjunctiva/corneas clear, EOM's intact, fundi     benign, both eyes        Ears:    Normal TM's and external ear canals, both ears   Nose:   Nares normal, septum midline, mucosa normal, no drainage    or sinus tenderness   Throat:   Lips, mucosa, and tongue normal; teeth and gums normal   Neck:   Supple, symmetrical, trachea midline, no adenopathy;        thyroid:  No enlargement/tenderness/nodules; no carotid    bruit or JVD       Lungs:     Clear to auscultation bilaterally, respirations unlabored       Heart:    Regular rate and rhythm, S1 and S2 normal, no murmur, rub   or gallop   Abdomen:     Soft, non-tender, bowel sounds active all four quadrants,     no masses, no organomegaly           Extremities:   Extremities normal, atraumatic, no cyanosis or edema                       Predictors of intubation difficulty:   Morbid obesity? no   Anatomically abnormal facies?  no   Prominent incisors? no   Receding mandible? no   Short, thick neck? no   Neck range of motion: normal      Cardiographics  ECG: no prior ECG  Echocardiogram: not done    Imaging  Chest x-ray: n/a     Lab Review   Appointment on 01/20/2020   Component Date Value    WBC 01/20/2020 5 62     RBC 01/20/2020 5 14     Hemoglobin 01/20/2020 15 8     Hematocrit 01/20/2020 48 9     MCV 01/20/2020 95     MCH 01/20/2020 30 7     MCHC 01/20/2020 32 3     RDW 01/20/2020 12 2     MPV 01/20/2020 9 9     Platelets 31/56/6175 333     nRBC 01/20/2020 0     Neutrophils Relative 01/20/2020 47     Immat GRANS % 01/20/2020 0     Lymphocytes Relative 01/20/2020 37     Monocytes Relative 01/20/2020 13*    Eosinophils Relative 01/20/2020 2     Basophils Relative 01/20/2020 1     Neutrophils Absolute 01/20/2020 2 70     Immature Grans Absolute 01/20/2020 0 01     Lymphocytes Absolute 01/20/2020 2 07     Monocytes Absolute 01/20/2020 0 71     Eosinophils Absolute 01/20/2020 0 09     Basophils Absolute 01/20/2020 0 04     Prostate Specific Antige* 01/20/2020 1 8     PSA, Free 01/20/2020 0 18     PSA, Free Pct 01/20/2020 10 0     Testosterone, Free 01/20/2020 11 7     TESTOSTERONE TOTAL 01/20/2020 195*    Sodium 01/20/2020 138     Potassium 01/20/2020 4 5     Chloride 01/20/2020 106     CO2 01/20/2020 27     ANION GAP 01/20/2020 5     BUN 01/20/2020 11     Creatinine 01/20/2020 1 06     Glucose, Fasting 01/20/2020 121*    Calcium 01/20/2020 10 0     AST 01/20/2020 21     ALT 01/20/2020 62     Alkaline Phosphatase 01/20/2020 53     Total Protein 01/20/2020 7 9     Albumin 01/20/2020 4 3     Total Bilirubin 01/20/2020 0 50     eGFR 01/20/2020 83    Appointment on 12/26/2019   Component Date Value    Testosterone, Free 12/26/2019 10 4     TESTOSTERONE TOTAL 12/26/2019 223*        Assessment:     54 y o  male with planned surgery as above  Known risk factors for perioperative complications: None    Difficulty with intubation is not anticipated  Cardiac Risk Estimation: Low    Current medications which may produce withdrawal symptoms if withheld perioperatively: no    Plan:     1  Preoperative workup as follows none  2  Change in medication regimen before surgery: none, continue medication regimen including morning of surgery, with sip of water  3  Prophylaxis for cardiac events with perioperative beta-blockers: not indicated  4  Invasive hemodynamic monitoring perioperatively: not indicated  5  Deep vein thrombosis prophylaxis postoperatively:regimen to be chosen by surgical team   6  Surveillance for postoperative MI with ECG immediately postoperatively and on postoperative days 1 and 2 AND troponin levels 24 hours postoperatively and on day 4 or hospital discharge (whichever comes first): not indicated

## 2020-01-23 NOTE — LETTER
January 23, 2020     Liam Resendez MD  [address] [address]    Patient: Jocelyn Vivar   YOB: 1970   Date of Visit: 1/23/2020       Dear Dr Evgeny Hernandez:    Thank you for referring Jocelyn Vivar to me for evaluation  Below are my notes for this consultation  If you have questions, please do not hesitate to call me  I look forward to following your patient along with you  Sincerely,        Iva Dixon MD        CC: No Recipients  Iva Dixon MD  1/23/2020 10:33 AM  Incomplete  Chief Complaint   Patient presents with    Pre-op Clearance     Left shoulder surgery scheduled on 02/10/20 with Dr Evgeny Hernandez  Nany Merino was seen today for pre-op clearance  Diagnoses and all orders for this visit:    Preop examination    Superior glenoid labrum lesion of left shoulder, subsequent encounter    Low testosterone in male  -     Discontinue: Testosterone 1 62 % GEL; Place 2 Act on the skin daily  -     CBC and differential; Future  -     Testosterone, free, total; Future  -     PSA, Total Screen; Future  -     Testosterone 1 62 % GEL; Place 2 Act on the skin daily    Erectile dysfunction, unspecified erectile dysfunction type  -     Discontinue: Testosterone 1 62 % GEL; Place 2 Act on the skin daily  -     Testosterone 1 62 % GEL; Place 2 Act on the skin daily    Mixed hyperlipidemia  -     Comprehensive metabolic panel; Future  -     Lipid Panel with Direct LDL reflex; Future    IFG (impaired fasting glucose)  -     Hemoglobin A1C With EAG; Future    Cold sore  -     acyclovir (ZOVIRAX) 5 % ointment; Apply topically every 4 (four) hours  -     valACYclovir (VALTREX) 1,000 mg tablet; Take 2 tablets (2,000 mg total) by mouth 2 (two) times a day as needed (cold sore) for up to 1 day    Depression with anxiety  -     buPROPion (WELLBUTRIN XL) 300 mg 24 hr tablet; Take 1 tablet (300 mg total) by mouth daily    Obesity (BMI 30-39  9)    BMI Counseling: Body mass index is 30 63 kg/m²   The BMI is above normal  Nutrition recommendations include reducing portion sizes, decreasing overall calorie intake and 3-5 servings of fruits/vegetables daily  Exercise recommendations include moderate aerobic physical activity for 150 minutes/week  Subjective:     Tierra Friend is a 54 y o  male who presents to the office today for a preoperative consultation at the request of surgeon Dr Naomy Granados who plans on performing left shoulder surgery on 2/10/2019  This consultation is requested for the preop  Planned anesthesia: general  The patient has the following known anesthesia issues: no problem  Patients bleeding risk: no recent abnormal bleeding  Patient does not have objections to receiving blood products if needed  Low testosterone----1/2020 testosterone 195 still low  Pt states he is on testosterone gel 1 act daily      Hyperlipidemia---Pt tried to follow low fat diet and he is on OTC fish oil 3g per day  Denies hx of stroke or MI      IFG---1/2020 fasting glucose 121 elevated  Cold sore---Use valtrex and acyclovir ointment as needed       Depression/anxiety----for years  He is on wellbutrin 300mg daily  Feels it is working  Denies suicidal ideation  Mother had depression/anxiety/?bipolar  Cousins had depression/anxiety per pt       No smoking    Alcohol 1 glass of wine daily    No drugs        The following portions of the patient's history were reviewed and updated as appropriate: allergies, current medications, past family history, past medical history, past social history, past surgical history and problem list     Review of Systems  Pertinent items are noted in HPI       Objective:     /86 (BP Location: Left arm, Patient Position: Sitting, Cuff Size: Adult)   Pulse 92   Temp 97 9 °F (36 6 °C) (Tympanic)   Resp 16   Ht 5' 9 5" (1 765 m)   Wt 95 4 kg (210 lb 6 4 oz)   SpO2 98%   BMI 30 63 kg/m²      General Appearance:    Alert, cooperative, no distress, appears stated age   Head: Normocephalic, without obvious abnormality, atraumatic   Eyes:    PERRL, conjunctiva/corneas clear, EOM's intact, fundi     benign, both eyes        Ears:    Normal TM's and external ear canals, both ears   Nose:   Nares normal, septum midline, mucosa normal, no drainage    or sinus tenderness   Throat:   Lips, mucosa, and tongue normal; teeth and gums normal   Neck:   Supple, symmetrical, trachea midline, no adenopathy;        thyroid:  No enlargement/tenderness/nodules; no carotid    bruit or JVD       Lungs:     Clear to auscultation bilaterally, respirations unlabored       Heart:    Regular rate and rhythm, S1 and S2 normal, no murmur, rub   or gallop   Abdomen:     Soft, non-tender, bowel sounds active all four quadrants,     no masses, no organomegaly           Extremities:   Extremities normal, atraumatic, no cyanosis or edema                       Predictors of intubation difficulty:   Morbid obesity? no   Anatomically abnormal facies?  no   Prominent incisors? no   Receding mandible? no   Short, thick neck? no   Neck range of motion: normal      Cardiographics  ECG: no prior ECG  Echocardiogram: not done    Imaging  Chest x-ray: n/a     Lab Review   Appointment on 01/20/2020   Component Date Value    WBC 01/20/2020 5 62     RBC 01/20/2020 5 14     Hemoglobin 01/20/2020 15 8     Hematocrit 01/20/2020 48 9     MCV 01/20/2020 95     MCH 01/20/2020 30 7     MCHC 01/20/2020 32 3     RDW 01/20/2020 12 2     MPV 01/20/2020 9 9     Platelets 88/72/6309 333     nRBC 01/20/2020 0     Neutrophils Relative 01/20/2020 47     Immat GRANS % 01/20/2020 0     Lymphocytes Relative 01/20/2020 37     Monocytes Relative 01/20/2020 13*    Eosinophils Relative 01/20/2020 2     Basophils Relative 01/20/2020 1     Neutrophils Absolute 01/20/2020 2 70     Immature Grans Absolute 01/20/2020 0 01     Lymphocytes Absolute 01/20/2020 2 07     Monocytes Absolute 01/20/2020 0 71     Eosinophils Absolute 01/20/2020 0 09     Basophils Absolute 01/20/2020 0 04     Prostate Specific Antige* 01/20/2020 1 8     PSA, Free 01/20/2020 0 18     PSA, Free Pct 01/20/2020 10 0     Testosterone, Free 01/20/2020 11 7     TESTOSTERONE TOTAL 01/20/2020 195*    Sodium 01/20/2020 138     Potassium 01/20/2020 4 5     Chloride 01/20/2020 106     CO2 01/20/2020 27     ANION GAP 01/20/2020 5     BUN 01/20/2020 11     Creatinine 01/20/2020 1 06     Glucose, Fasting 01/20/2020 121*    Calcium 01/20/2020 10 0     AST 01/20/2020 21     ALT 01/20/2020 62     Alkaline Phosphatase 01/20/2020 53     Total Protein 01/20/2020 7 9     Albumin 01/20/2020 4 3     Total Bilirubin 01/20/2020 0 50     eGFR 01/20/2020 83    Appointment on 12/26/2019   Component Date Value    Testosterone, Free 12/26/2019 10 4     TESTOSTERONE TOTAL 12/26/2019 223*        Assessment:     54 y o  male with planned surgery as above  Known risk factors for perioperative complications: None    Difficulty with intubation is not anticipated  Cardiac Risk Estimation: Low    Current medications which may produce withdrawal symptoms if withheld perioperatively: no    Plan:     1  Preoperative workup as follows none  2  Change in medication regimen before surgery: none, continue medication regimen including morning of surgery, with sip of water  3  Prophylaxis for cardiac events with perioperative beta-blockers: not indicated  4  Invasive hemodynamic monitoring perioperatively: not indicated  5  Deep vein thrombosis prophylaxis postoperatively:regimen to be chosen by surgical team   6  Surveillance for postoperative MI with ECG immediately postoperatively and on postoperative days 1 and 2 AND troponin levels 24 hours postoperatively and on day 4 or hospital discharge (whichever comes first): not indicated       Michael Vidal MD  1/23/2020 10:07 AM  Sign at close encounter  Chief Complaint   Patient presents with    Pre-op Clearance     Left shoulder surgery scheduled on 02/10/20 with Dr Alla Rm  Subjective:     Suzette Moran is a 54 y o  male who presents to the office today for a preoperative consultation at the request of surgeon Dr Alla Rm who plans on performing left shoulder surgery on 2/10/2019  This consultation is requested for the preop  Planned anesthesia: general  The patient has the following known anesthesia issues: no problem  Patients bleeding risk: no recent abnormal bleeding  Patient does not have objections to receiving blood products if needed  9/2019 testosterone 278 low normal range  Pt would like to try testosterone supplement       Hyperlipidemia---Pt tried to follow low fat diet  9/2019 256/193/52/165 stable  Denies hx of stroke or MI       Depression/anxiety----for years  He is on wellbutrin 300mg daily  Feels it is working  Denies suicidal ideation  Mother had depression/anxiety/?bipolar  Cousins had depression/anxiety per pt       No smoking    Alcohol 1 glass of wine daily    No drugs        The following portions of the patient's history were reviewed and updated as appropriate: allergies, current medications, past family history, past medical history, past social history, past surgical history and problem list     Review of Systems  Pertinent items are noted in HPI       Objective:     /86 (BP Location: Left arm, Patient Position: Sitting, Cuff Size: Adult)   Pulse 92   Temp 97 9 °F (36 6 °C) (Tympanic)   Resp 16   Ht 5' 9 5" (1 765 m)   Wt 95 4 kg (210 lb 6 4 oz)   SpO2 98%   BMI 30 63 kg/m²      General Appearance:    Alert, cooperative, no distress, appears stated age   Head:    Normocephalic, without obvious abnormality, atraumatic   Eyes:    PERRL, conjunctiva/corneas clear, EOM's intact, fundi     benign, both eyes        Ears:    Normal TM's and external ear canals, both ears   Nose:   Nares normal, septum midline, mucosa normal, no drainage    or sinus tenderness   Throat:   Lips, mucosa, and tongue normal; teeth and gums normal   Neck:   Supple, symmetrical, trachea midline, no adenopathy;        thyroid:  No enlargement/tenderness/nodules; no carotid    bruit or JVD   Back:     Symmetric, no curvature, ROM normal, no CVA tenderness   Lungs:     Clear to auscultation bilaterally, respirations unlabored   Chest wall:    No tenderness or deformity   Heart:    Regular rate and rhythm, S1 and S2 normal, no murmur, rub   or gallop   Abdomen:     Soft, non-tender, bowel sounds active all four quadrants,     no masses, no organomegaly   Genitalia:    Normal male without lesion, discharge or tenderness   Rectal:    Normal tone, normal prostate, no masses or tenderness;    guaiac negative stool   Extremities:   Extremities normal, atraumatic, no cyanosis or edema   Pulses:   2+ and symmetric all extremities   Skin:   Skin color, texture, turgor normal, no rashes or lesions   Lymph nodes:   Cervical, supraclavicular, and axillary nodes normal   Neurologic:   CNII-XII intact  Normal strength, sensation and reflexes       throughout       Predictors of intubation difficulty:   Morbid obesity? {yes***/no:28860::"no"}   Anatomically abnormal facies? {yes***/no:08150::"no"}   Prominent incisors? {yes***/no:56613::"no"}   Receding mandible? {yes***/no:66205::"no"}   Short, thick neck? {yes***/no:30032::"no"}   Neck range of motion: {normal/abnormal:89543::"normal"}   Mallampati score: {score:77783}   Thyromental distance: {distance:67832}   Mouth opening: ***cm   Dentition: {exam; [facility]:42154}    Cardiographics  ECG: {findings; ec}  Echocardiogram: {findings; OXHY:53715}    Imaging  Chest x-ray: {findings; x-ray:27532}     Lab Review   {recent [facility]:75238::"WAS applicable"}     Assessment:     54 y o  male with planned surgery as above  Known risk factors for perioperative complications: {risk VSLGBAO:58027::"RFVR"}    Difficulty with intubation {is/is not:9024} anticipated      Cardiac Risk Estimation: ***    Current medications which may produce withdrawal symptoms if withheld perioperatively: ***     Plan:     1  Preoperative workup as follows {studies; preop:32410}  2  Change in medication regimen before surgery: {meds; preop:41472}  3  Prophylaxis for cardiac events with perioperative beta-blockers: {preop beta-blockers:39906}  4  Invasive hemodynamic monitoring perioperatively: {not indicated/strongly advised:18814}  5  Deep vein thrombosis prophylaxis postoperatively:{dvt prophylaxis:47451}  6  Surveillance for postoperative MI with ECG immediately postoperatively and on postoperative days 1 and 2 AND troponin levels 24 hours postoperatively and on day 4 or hospital discharge (whichever comes first): {not indicated/strongly advised:74968}    7  Other measures: {preop recommendations:93363}

## 2020-02-06 RX ORDER — COVID-19 ANTIGEN TEST
KIT MISCELLANEOUS AS NEEDED
COMMUNITY
End: 2020-04-21 | Stop reason: ALTCHOICE

## 2020-02-06 NOTE — PRE-PROCEDURE INSTRUCTIONS
Pre-Surgery Instructions:   Medication Instructions    acyclovir (ZOVIRAX) 5 % ointment Instructed patient per Anesthesia Guidelines   buPROPion (WELLBUTRIN XL) 300 mg 24 hr tablet Instructed patient per Anesthesia Guidelines   Cholecalciferol (VITAMIN D) 2000 units CAPS Instructed patient per Anesthesia Guidelines   glucosamine-chondroitin 500-400 MG tablet Instructed patient per Anesthesia Guidelines   Multiple Vitamin (MULTIVITAMIN) tablet Instructed patient per Anesthesia Guidelines   Naproxen Sodium (ALEVE) 220 MG CAPS Instructed patient per Anesthesia Guidelines   Omega-3 Fatty Acids (FISH OIL) 1,000 mg Instructed patient per Anesthesia Guidelines   Testosterone 1 62 % GEL Instructed patient per Anesthesia Guidelines   valACYclovir (VALTREX) 1,000 mg tablet Instructed patient per Anesthesia Guidelines  Patient via TC and per anesth guideline  instructed to take bupropion*with a sip of water the morning of surgery  Patient given/ instructed on use of chlorhexidine soap per hospital protocol    Patient instructed to stop all ASA, NSAIDS, vitamins and herbal supplements one week prior to surgery or per Dr Gwendolyn Christine

## 2020-02-07 ENCOUNTER — ANESTHESIA EVENT (OUTPATIENT)
Dept: PERIOP | Facility: HOSPITAL | Age: 50
End: 2020-02-07
Payer: COMMERCIAL

## 2020-02-10 ENCOUNTER — ANESTHESIA (OUTPATIENT)
Dept: PERIOP | Facility: HOSPITAL | Age: 50
End: 2020-02-10
Payer: COMMERCIAL

## 2020-02-10 ENCOUNTER — HOSPITAL ENCOUNTER (OUTPATIENT)
Facility: HOSPITAL | Age: 50
Setting detail: OUTPATIENT SURGERY
Discharge: HOME | End: 2020-02-10
Attending: ORTHOPAEDIC SURGERY | Admitting: ORTHOPAEDIC SURGERY
Payer: COMMERCIAL

## 2020-02-10 VITALS
HEART RATE: 76 BPM | SYSTOLIC BLOOD PRESSURE: 110 MMHG | DIASTOLIC BLOOD PRESSURE: 69 MMHG | RESPIRATION RATE: 18 BRPM | HEIGHT: 71 IN | BODY MASS INDEX: 29.12 KG/M2 | TEMPERATURE: 97.9 F | WEIGHT: 208 LBS | OXYGEN SATURATION: 98 %

## 2020-02-10 DIAGNOSIS — S43.432A SUPERIOR GLENOID LABRUM LESION OF LEFT SHOULDER, INITIAL ENCOUNTER: Primary | ICD-10-CM

## 2020-02-10 PROCEDURE — 29806 SHO ARTHRS SRG CAPSULORRAPHY: CPT | Performed by: ORTHOPAEDIC SURGERY

## 2020-02-10 PROCEDURE — C9290 INJ, BUPIVACAINE LIPOSOME: HCPCS | Performed by: ANESTHESIOLOGY

## 2020-02-10 PROCEDURE — 29807 SHO ARTHRS SRG RPR SLAP LES: CPT | Performed by: ORTHOPAEDIC SURGERY

## 2020-02-10 PROCEDURE — C1713 ANCHOR/SCREW BN/BN,TIS/BN: HCPCS | Performed by: ORTHOPAEDIC SURGERY

## 2020-02-10 DEVICE — ANCHOR SUT KNOTLESS FIBERTAK 1.8: Type: IMPLANTABLE DEVICE | Site: SHOULDER | Status: FUNCTIONAL

## 2020-02-10 RX ORDER — MEPERIDINE HYDROCHLORIDE 50 MG/ML
12.5 INJECTION INTRAMUSCULAR; INTRAVENOUS; SUBCUTANEOUS ONCE AS NEEDED
Status: DISCONTINUED | OUTPATIENT
Start: 2020-02-10 | End: 2020-02-10 | Stop reason: HOSPADM

## 2020-02-10 RX ORDER — DEXAMETHASONE SODIUM PHOSPHATE 10 MG/ML
INJECTION, SOLUTION INTRAMUSCULAR; INTRAVENOUS AS NEEDED
Status: DISCONTINUED | OUTPATIENT
Start: 2020-02-10 | End: 2020-02-10 | Stop reason: SURG

## 2020-02-10 RX ORDER — FENTANYL CITRATE/PF 50 MCG/ML
50 SYRINGE (ML) INJECTION
Status: DISCONTINUED | OUTPATIENT
Start: 2020-02-10 | End: 2020-02-10 | Stop reason: HOSPADM

## 2020-02-10 RX ORDER — ONDANSETRON 2 MG/ML
4 INJECTION INTRAMUSCULAR; INTRAVENOUS ONCE AS NEEDED
Status: DISCONTINUED | OUTPATIENT
Start: 2020-02-10 | End: 2020-02-10 | Stop reason: HOSPADM

## 2020-02-10 RX ORDER — ONDANSETRON 4 MG/1
4 TABLET, ORALLY DISINTEGRATING ORAL EVERY 8 HOURS PRN
Qty: 15 TABLET | Refills: 0 | Status: SHIPPED | OUTPATIENT
Start: 2020-02-10 | End: 2020-04-21 | Stop reason: ALTCHOICE

## 2020-02-10 RX ORDER — OXYCODONE HYDROCHLORIDE 5 MG/1
5 TABLET ORAL EVERY 4 HOURS PRN
Qty: 45 TABLET | Refills: 0 | Status: SHIPPED | OUTPATIENT
Start: 2020-02-10 | End: 2020-02-20

## 2020-02-10 RX ORDER — SODIUM CHLORIDE 9 MG/ML
125 INJECTION, SOLUTION INTRAVENOUS CONTINUOUS
Status: DISCONTINUED | OUTPATIENT
Start: 2020-02-10 | End: 2020-02-10 | Stop reason: HOSPADM

## 2020-02-10 RX ORDER — SUCCINYLCHOLINE/SOD CL,ISO/PF 100 MG/5ML
SYRINGE (ML) INTRAVENOUS AS NEEDED
Status: DISCONTINUED | OUTPATIENT
Start: 2020-02-10 | End: 2020-02-10 | Stop reason: SURG

## 2020-02-10 RX ORDER — NAPROXEN 500 MG/1
500 TABLET ORAL 2 TIMES DAILY WITH MEALS
Qty: 60 TABLET | Refills: 0 | Status: SHIPPED | OUTPATIENT
Start: 2020-02-10 | End: 2020-04-21 | Stop reason: ALTCHOICE

## 2020-02-10 RX ORDER — BUPIVACAINE HYDROCHLORIDE 5 MG/ML
INJECTION, SOLUTION PERINEURAL
Status: COMPLETED | OUTPATIENT
Start: 2020-02-10 | End: 2020-02-10

## 2020-02-10 RX ORDER — HYDROMORPHONE HCL/PF 1 MG/ML
0.5 SYRINGE (ML) INJECTION
Status: DISCONTINUED | OUTPATIENT
Start: 2020-02-10 | End: 2020-02-10 | Stop reason: HOSPADM

## 2020-02-10 RX ORDER — FENTANYL CITRATE 50 UG/ML
INJECTION, SOLUTION INTRAMUSCULAR; INTRAVENOUS
Status: COMPLETED | OUTPATIENT
Start: 2020-02-10 | End: 2020-02-10

## 2020-02-10 RX ORDER — LIDOCAINE HYDROCHLORIDE 10 MG/ML
INJECTION, SOLUTION EPIDURAL; INFILTRATION; INTRACAUDAL; PERINEURAL AS NEEDED
Status: DISCONTINUED | OUTPATIENT
Start: 2020-02-10 | End: 2020-02-10 | Stop reason: SURG

## 2020-02-10 RX ORDER — MIDAZOLAM HYDROCHLORIDE 2 MG/2ML
INJECTION, SOLUTION INTRAMUSCULAR; INTRAVENOUS
Status: COMPLETED | OUTPATIENT
Start: 2020-02-10 | End: 2020-02-10

## 2020-02-10 RX ORDER — OXYCODONE HYDROCHLORIDE 5 MG/1
10 TABLET ORAL EVERY 4 HOURS PRN
Status: DISCONTINUED | OUTPATIENT
Start: 2020-02-10 | End: 2020-02-10 | Stop reason: HOSPADM

## 2020-02-10 RX ORDER — PROPOFOL 10 MG/ML
INJECTION, EMULSION INTRAVENOUS AS NEEDED
Status: DISCONTINUED | OUTPATIENT
Start: 2020-02-10 | End: 2020-02-10 | Stop reason: SURG

## 2020-02-10 RX ORDER — CEFAZOLIN SODIUM 1 G/50ML
1000 SOLUTION INTRAVENOUS ONCE
Status: COMPLETED | OUTPATIENT
Start: 2020-02-10 | End: 2020-02-10

## 2020-02-10 RX ORDER — OXYCODONE HYDROCHLORIDE 5 MG/1
5 TABLET ORAL EVERY 4 HOURS PRN
Status: DISCONTINUED | OUTPATIENT
Start: 2020-02-10 | End: 2020-02-10 | Stop reason: HOSPADM

## 2020-02-10 RX ORDER — ONDANSETRON 2 MG/ML
INJECTION INTRAMUSCULAR; INTRAVENOUS AS NEEDED
Status: DISCONTINUED | OUTPATIENT
Start: 2020-02-10 | End: 2020-02-10 | Stop reason: SURG

## 2020-02-10 RX ADMIN — PROPOFOL 150 MG: 10 INJECTION, EMULSION INTRAVENOUS at 15:15

## 2020-02-10 RX ADMIN — CEFAZOLIN SODIUM 2000 MG: 1 SOLUTION INTRAVENOUS at 14:49

## 2020-02-10 RX ADMIN — BUPIVACAINE HYDROCHLORIDE 10 ML: 5 INJECTION, SOLUTION PERINEURAL at 14:48

## 2020-02-10 RX ADMIN — PHENYLEPHRINE HYDROCHLORIDE 200 MCG: 10 INJECTION INTRAVENOUS at 16:12

## 2020-02-10 RX ADMIN — LIDOCAINE HYDROCHLORIDE 100 MG: 10 INJECTION, SOLUTION EPIDURAL; INFILTRATION; INTRACAUDAL; PERINEURAL at 15:01

## 2020-02-10 RX ADMIN — PROPOFOL 200 MG: 10 INJECTION, EMULSION INTRAVENOUS at 15:01

## 2020-02-10 RX ADMIN — MIDAZOLAM 4 MG: 1 INJECTION INTRAMUSCULAR; INTRAVENOUS at 14:48

## 2020-02-10 RX ADMIN — FENTANYL CITRATE 100 MCG: 50 INJECTION, SOLUTION INTRAMUSCULAR; INTRAVENOUS at 14:48

## 2020-02-10 RX ADMIN — PHENYLEPHRINE HYDROCHLORIDE 100 MCG: 10 INJECTION INTRAVENOUS at 16:09

## 2020-02-10 RX ADMIN — SODIUM CHLORIDE: 0.9 INJECTION, SOLUTION INTRAVENOUS at 16:28

## 2020-02-10 RX ADMIN — Medication 100 MG: at 15:15

## 2020-02-10 RX ADMIN — ONDANSETRON 4 MG: 2 INJECTION INTRAMUSCULAR; INTRAVENOUS at 16:35

## 2020-02-10 RX ADMIN — DEXAMETHASONE SODIUM PHOSPHATE 8 MG: 10 INJECTION, SOLUTION INTRAMUSCULAR; INTRAVENOUS at 15:20

## 2020-02-10 RX ADMIN — PHENYLEPHRINE HYDROCHLORIDE 100 MCG: 10 INJECTION INTRAVENOUS at 16:03

## 2020-02-10 RX ADMIN — SODIUM CHLORIDE: 0.9 INJECTION, SOLUTION INTRAVENOUS at 16:12

## 2020-02-10 RX ADMIN — SODIUM CHLORIDE 125 ML/HR: 0.9 INJECTION, SOLUTION INTRAVENOUS at 11:55

## 2020-02-10 NOTE — ANESTHESIA PREPROCEDURE EVALUATION
Review of Systems/Medical History  Patient summary reviewed  Chart reviewed  No history of anesthetic complications     Cardiovascular  Exercise tolerance (METS): >4,  Hyperlipidemia,    Pulmonary  Smoker ex-smoker  ,        GI/Hepatic  Negative GI/hepatic ROS          Negative  ROS        Endo/Other  Negative endo/other ROS      GYN       Hematology   Musculoskeletal    Arthritis     Neurology  Negative neurology ROS      Psychology   Anxiety, Depression , being treated for depression,              Physical Exam    Airway    Mallampati score: II  TM Distance: >3 FB  Neck ROM: full     Dental       Cardiovascular  Rate: normal, Cardiovascular exam normal    Pulmonary  Pulmonary exam normal     Other Findings  Fixed upper and lower teeth and in good repair      Anesthesia Plan  ASA Score- 2     Anesthesia Type- general with ASA Monitors  Additional Monitors:   Airway Plan: LMA  Comment: ISB preop discussed  Plan Factors-Patient not instructed to abstain from smoking on day of procedure  Patient did not smoke on day of surgery  Induction- intravenous  Postoperative Plan- Plan for postoperative opioid use  Informed Consent- Anesthetic plan and risks discussed with patient

## 2020-02-10 NOTE — OP NOTE
OPERATIVE REPORT    PATIENT NAME: Leila Gutierrez   :  1970  MRN: [id number]  Pt Location: [facility] OR ROOM 02    SURGERY DATE: 2/10/2020    SURGEON(S) and ROLE:  Primary: Janene Syed MD    NOTE:  The presence of a physician assistant was necessary to help with patient positioning, surgical exposure, wound retraction, wound closure, and other key portions of the procedure  No qualified resident was available for this case  PREOPERATIVE DIAGNOSES:  Left Shoulder  Posterior Labral Tear  Superior Labral Tear    POSTOPERATIVE DIAGNOSES:  Left Shoulder  Posterior Labral Tear  Superior Labral Tear    PROCEDURES:  Left Shoulder Arthroscopy with:  Superior Labral Repair  Posterior Labral Repair      ANESTHESIA TYPE:  General endotracheal and Interscalene block    ANESTHESIA STAFF:   Anesthesiologist: Maryjo Zurita DO  CRNA: Antonieta Campa CRNA; Jarad Everett CRNA    ESTIMATED BLOOD LOSS:  5 mL    PERIOPERATIVE ANTIBIOTICS:  cefazolin, 2 grams    IMPLANTS:  Arthrex Fibertack Knotless (x5)    Implant Name Type Inv  Item Serial No   Lot No  LRB No  Used Action   ANCHOR SUT KNOTLESS FIBERTAK 1 8 - [id number]  ANCHOR SUT KNOTLESS FIBERTAK 1 8  [employer] [id number] Left 2 Implanted   ANCHOR SUT KNOTLESS FIBERTAK 1 8 - [id number]  ANCHOR SUT KNOTLESS FIBERTAK 1 8  [employer] [id number] Left 3 Implanted       SPECIMENS:  * No specimens in log *      OPERATIVE INDICATIONS:  The patient is a 54 y o  male with left shoulder pain and a posterosuperior labral tear  Surgical treatment was indicated due to persistent symptoms despite non-surgical treatment  After a thorough discussion of the potential risks, benefits, and alternative treatments, the patient agreed to proceed with surgery    The patient understands that the risks of surgery include, but are not limited to: failure of repair, infection, neurovascular injury, wound healing complications, venous thromboembolism, persistent pain, stiffness, instability, recurrence of symptoms, potential need for additional surgeries, and loss of limb or life  Oral and written consent for surgery was obtained from the patient preoperatively  EXAM UNDER ANESTHESIA:  Operative shoulder:   FE-170  ER-70  AER-80  AIR-70;  Load-Shift- 1+ ant / 2+ post;  Sulcus- 1 cm    PROCEDURE AND TECHNIQUE:  On the day of surgery, the patient was identified in the preoperative holding area  The operative site was marked by the surgeon  The patient was taken into the operating room  A time-out was conducted to confirm the patient's identity, the operative site, and the proposed procedure  The patient was anesthetized, and perioperative antibiotics were administered  The patient was positioned beach chair on the OSI frame  All bony prominences were padded  The operative site was prepped and draped using standard sterile technique  A posterior portal was established, and the arthroscope was placed into the glenohumeral joint  An anterosuperior portal was established through the rotator interval   Diagnostic arthroscopy was performed  The glenohumeral joint demonstrated mild synovitis which was debrided with a motorized shaver  The glenoid demonstrated focal grade 3 chondral wear in the anteroinferior quadrant which  was treated with chondroplasty to remove all loose flaps of tissue   The humeral head demonstrated focal grade 3 chondral wear in the superior aspect adjacent to the supraspinatus insertion which  required no treatment   The long head of the biceps tendon was not present  The superior labrum had a Type 2 tear from 11 o'clock anterosuperior to 5 o'clock posteroinferior  An accessory anterolateral portal was established  [address] was established  The supraglenoid tubercle was prepared to a bleeding bed of bone with a leandro  The superior labrum was repaired using two Arthrex FiberTack Knotless anchors placed at 12:00 and 1:00      The posterior capsulolabral complex  had a labral tear from 11 o'clock anterosuperior to 5 o'clock posteroinferior  The anterior capsulolabral complex was intact  The labrum was repaired with three Arthrex Fibertack KNotless suture anchors placed at 3 o'clock, 4 o'clock and 5 o'clock  The labral fixation was excellent, and it restored the capsulolabral bumper and appropriately tensioned the inferior glenohumeral ligament  The subscapularis tendon was intact       The posterosuperior rotator cuff was intact       At the conclusion of the procedure, the instruments were withdrawn  The portals and incisions were closed with absorbable sutures and steri-strips  A sterile dressing was applied  The surgical drapes were removed  The operative arm was immobilized in a external rotation brace  The patient was awakened from anesthesia and transported to the PACU in stable condition        COMPLICATIONS:  None    PATIENT DISPOSITION:  PACU       SIGNATURE:  Irma Lo MD  DATE:  February 10, 2020  TIME:  4:36 PM

## 2020-02-10 NOTE — DISCHARGE INSTRUCTIONS
POSTOPERATIVE INSTRUCTIONS following SHOULDER SURGERY    MEDICATIONS:  · Resume all home medications unless otherwise instructed by your surgeon  · Pain Medication:  Oxycodone 5 mg, 1-3 tablets every 3 hours as needed  · If you were given a regional anesthetic (nerve block), please begin taking the pain medication as soon as you get home, even if you have minimal or no pain  DO NOT WAIT FOR THE NERVE BLOCK TO WEAR OFF  · Possible side effects include nausea, constipation, and urinary retention  If you experience these side effects, please call our office for assistance  · Pain med refills are authorized only during office hours (8am-4pm, Mon-Fri)  · Anti-Inflammatory:  Naproxen 500 mg, 1 tablet every 12 hours for 4 weeks  · TAKE WITH FOOD  Stop if you experience nausea, reflux, or stomach pain  · Nausea Medication:  Zofran ODT 4 mg, 1 tablet every 6 hours as needed for nausea  · Fill prescription ONLY if you expericnce severe nausea  WOUND CARE:  · Keep the dressing clean and dry  Light drainage may occur the first 2 days postop  · You may remove the dressings and get the incision wet in the shower 72 hours after surgery  DO NOT remove steri-strips or sutures  DO NOT immerse the incision under water  Carefully pat the incision dry  If there is wound drainage, re-apply a fresh dry gauze dressing  · Please call our office ([phone]) if you experience either of the following:  · Sudden increase in swelling, redness, or warmth at the surgical site  · Excessive incisional drainage that persists beyond the 3rd day after surgery  · Oral temperature greater than 101 degrees, not relieved with Tylenol  · Shortness of breath, chest pain, nausea, or any other concerning symptoms    SWELLING CONTROL:  · Cold Therapy: The cold therapy device may be used either continuously or only as needed, according to your preference  Do not let the pad directly touch your skin    Alternatively, apply ice (20 min on, 20 min off) as often as you feel is necessary  SLING:  · Wear your sling AT ALL TIMES (including sleep) until your first postoperative office visit  You may remove the sling for showering but must keep your arm at your side  ACTIVITY:   · DO NOT lift, carry, push, or pull anything with your operative arm  · Shoulder:  DO NOT actively (on your own) raise your operative arm away from the side of you body or rotate it out away from your body unless instructed by your surgeon or physical therapist   · Place a pillow behind the elbow while lying down  · Sleeping in a more upright position (recliner) may be more comfortable initially  · Wrist / Finger Motion:  With the sling on, move your wrist and fingers through a full range of motion 20 times per hour while awake  PHYSICAL THERAPY:  · Begin therapy 5 TO 7 DAYS AFTER SURGERY  You were given a prescription for therapy at your preoperative office visit  If you do not have physical therapy scheduled yet, please call our office for assistance  FOLLOW-UP APPOINTMENT:  · 4-5 days after surgery with:    Dr Elfreda Chol [place], [address] [facility]  [address] [place] [address], [address] [place] [address], [facility], [address]  [phone] ([facility])  [phone] (After Hours)

## 2020-02-10 NOTE — INTERVAL H&P NOTE
H&P reviewed  After examining the patient I find no changes in the patients condition since the H&P had been written      Vitals:    02/10/20 1144   BP: 119/80   Pulse: 75   Resp: 16   Temp: 97 8 °F (36 6 °C)   SpO2: 96%

## 2020-02-10 NOTE — ANESTHESIA POSTPROCEDURE EVALUATION
Post-Op Assessment Note    CV Status:  Stable  Pain Score: 1    Pain management: adequate     Mental Status:  Alert and awake   Hydration Status:  Euvolemic   PONV Controlled:  Controlled   Airway Patency:  Patent   Post Op Vitals Reviewed: Yes      Staff: Anesthesiologist           BP (P) 117/68 (02/10/20 1645)    Temp (P) 97 5 °F (36 4 °C) (02/10/20 1645)    Pulse (P) 78 (02/10/20 1645)   Resp (P) 16 (02/10/20 1645)    SpO2 (P) 100 % (02/10/20 1645)

## 2020-02-18 ENCOUNTER — OFFICE VISIT (OUTPATIENT)
Dept: OBGYN CLINIC | Facility: MEDICAL CENTER | Age: 50
End: 2020-02-18

## 2020-02-18 VITALS
WEIGHT: 200 LBS | SYSTOLIC BLOOD PRESSURE: 120 MMHG | HEART RATE: 96 BPM | HEIGHT: 71 IN | BODY MASS INDEX: 28 KG/M2 | DIASTOLIC BLOOD PRESSURE: 76 MMHG

## 2020-02-18 DIAGNOSIS — S43.432D SUPERIOR GLENOID LABRUM LESION OF LEFT SHOULDER, SUBSEQUENT ENCOUNTER: Primary | ICD-10-CM

## 2020-02-18 PROCEDURE — 99024 POSTOP FOLLOW-UP VISIT: CPT | Performed by: ORTHOPAEDIC SURGERY

## 2020-02-18 PROCEDURE — 3008F BODY MASS INDEX DOCD: CPT | Performed by: ORTHOPAEDIC SURGERY

## 2020-02-18 NOTE — PROGRESS NOTES
Orthopaedic Surgery - Office Note  Rhonda Chauhan (38 y o  male)   : 1970   MRN: [id number]  Encounter Date: 2020    Chief Complaint   Patient presents with    Left Shoulder - Post-op       Assessment / Plan  S/p left shoulder arthroscopy with superior and posterior labral repair, with appropriate progression     · Activity restriction: no overhead range of motion and no lifting anything heavier than a glass of water  · Continue Sling with ER brace  · May come out of the sling to work on elbow extension and flexion  · Continue the use of ice for pain  · Begin outpatient PT for superior and posterior labral repair  Patient was provided with a PT protocol at today's visit and he was instructed to bring it with him to his first post op visit  Return in about 6 weeks (around 3/31/2020)  History of Present Illness  Rhonda Chauhan is a 54 y o  male who presents for follow-up evaluation of left shoulder  He is status post left shoulder arthroscopy with superior and posterior labral repair performed on February 10, 2020  He reports at this time his pain is well controlled  He is also using the ice machine on a daily basis which she is very happy with the benefit  He has not started formal physical therapy at this point however he plans to make his 1st appointment after today's appointment  He has been sleeping in a recliner chair since his surgery  He has been compliant with wearing his sling  He denies any further injury or trauma to his left shoulder  He denies any distal paresthesias  Review of Systems  Pertinent items are noted in HPI  All other systems were reviewed and are negative  Physical Exam  /76   Pulse 96   Ht 5' 11" (1 803 m)   Wt 90 7 kg (200 lb)   BMI 27 89 kg/m²   Cons: Appears well  No apparent distress  Psych: Alert  Oriented x3  Mood and affect normal   Eyes: PERRLA, EOMI  Resp: Normal effort  No audible wheezing or stridor  CV: Palpable pulse    No discernable arrhythmia  No LE edema  Lymph:  No palpable cervical, axillary, or inguinal lymphadenopathy  Skin: Warm  No palpable masses  No visible lesions  Neuro: Normal muscle tone  Normal and symmetric DTR's  Left Shoulder Exam  Alignment / Posture:  Normal shoulder posture  Inspection:  No swelling  Incisions clean and dry  Palpation:  No tenderness  ROM:  Normal elbow ROM  Normal wrist ROM  Strength:  5/5  and pinch  Stability:  No objective shoulder instability  Tests: No pertinent positive or negative tests  Neurovascular:  Sensation intact in Ax/R/M/U nerve distributions  2+ radial pulse  Studies Reviewed  Avinger Service obtain from right shoulder arthroscopy obtained on February 10, 2020 were reviewed with patient today    Procedures  No procedures today  Medical, Surgical, Family, and Social History  The patient's medical history, family history, and social history, were reviewed and updated as appropriate      Past Medical History:   Diagnosis Date    Anxiety     "general work related"    Dislocated shoulder     originally approx 25 yrs ago//left    Exercises 3 to 4 times per week     4-5x/week    Hyperlipidemia     borderline    Impaired fasting glucose     Labral tear of shoulder     left with occas pain    Olecranon bursitis, left elbow     had surgery    Shoulder arthritis     left    Vitamin D deficiency     pt not sure    Wears glasses     reading       Past Surgical History:   Procedure Laterality Date    ARTHROSCOPIC BICEPS TENODESIS Left 2/8/2018    Procedure: SHOULDER ARTHROSCOPY WITH BICEPS TENODESIS;  Surgeon: Queenie Longo MD;  Location: [address] [place] [address];  Service: Orthopedics    FL INJECTION LEFT SHOULDER (ARTHROGRAM)  1/8/2020    [place] REMOVAL OF ELBOW BURSA Left 10/3/2019    Procedure: EXCISION BURSA OLECRANON ELBOW;  Surgeon: Liam Scott MD;  Location: [address] [place] [address];  Service: Orthopedics    [place] SHLDR ARTHROSCOP,SURG,REPAIR,SLAP LESION Left 2/10/2020    Procedure: ARTHROSCOPY SHOULDER, posterior labral repair;  Surgeon: Amita Cheatham MD;  Location: [facility];  Service: Orthopedics    ROTATOR CUFF REPAIR      ROTATOR CUFF REPAIR Left     x2 last one     SEPTOPLASTY      WISDOM TOOTH EXTRACTION         Family History   Problem Relation Age of Onset    No Known Problems Mother     No Known Problems Father        Social History     Occupational History    Not on file   Tobacco Use    Smoking status: Former Smoker     Years: 15 00     Last attempt to quit: 2007     Years since quittin 0    Smokeless tobacco: Never Used   Substance and Sexual Activity    Alcohol use: Yes     Comment: socially    Drug use: No    Sexual activity: Yes     Partners: Female       No Known Allergies      Current Outpatient Medications:     acyclovir (ZOVIRAX) 5 % ointment, Apply topically every 4 (four) hours (Patient taking differently: Apply topically as needed ), Disp: 15 g, Rfl: 0    buPROPion (WELLBUTRIN XL) 300 mg 24 hr tablet, Take 1 tablet (300 mg total) by mouth daily, Disp: 90 tablet, Rfl: 3    Cholecalciferol (VITAMIN D) 2000 units CAPS, Take by mouth, Disp: , Rfl:     glucosamine-chondroitin 500-400 MG tablet, Take 1 tablet by mouth every morning, Disp: , Rfl:     Multiple Vitamin (MULTIVITAMIN) tablet, Take 1 tablet by mouth daily, Disp: , Rfl:     naproxen (NAPROSYN) 500 mg tablet, Take 1 tablet (500 mg total) by mouth 2 (two) times a day with meals, Disp: 60 tablet, Rfl: 0    Naproxen Sodium (ALEVE) 220 MG CAPS, Take by mouth as needed, Disp: , Rfl:     Omega-3 Fatty Acids (FISH OIL) 1,000 mg, Take 1,000 mg by mouth daily, Disp: , Rfl:     ondansetron (ZOFRAN-ODT) 4 mg disintegrating tablet, Take 1 tablet (4 mg total) by mouth every 8 (eight) hours as needed for nausea or vomiting, Disp: 15 tablet, Rfl: 0    oxyCODONE (ROXICODONE) 5 mg immediate release tablet, Take 1 tablet (5 mg total) by mouth every 4 (four) hours as needed for moderate pain for up to 10 daysMax Daily Amount: 30 mg, Disp: 45 tablet, Rfl: 0    Testosterone 1 62 % GEL, Place 2 Act on the skin daily, Disp: 75 g, Rfl: 0    valACYclovir (VALTREX) 1,000 mg tablet, Take 2 tablets (2,000 mg total) by mouth 2 (two) times a day as needed (cold sore) for up to 1 day, Disp: 30 tablet, Rfl: 1      Mckayla Rubio    Scribe Attestation    I,:   Loren Gallegos am acting as a scribe while in the presence of the attending physician :        I,:   Joanne Quick MD personally performed the services described in this documentation    as scribed in my presence :

## 2020-02-24 ENCOUNTER — EVALUATION (OUTPATIENT)
Dept: PHYSICAL THERAPY | Facility: MEDICAL CENTER | Age: 50
End: 2020-02-24
Payer: COMMERCIAL

## 2020-02-24 DIAGNOSIS — S43.432D SUPERIOR GLENOID LABRUM LESION OF LEFT SHOULDER, SUBSEQUENT ENCOUNTER: Primary | ICD-10-CM

## 2020-02-24 PROCEDURE — 97161 PT EVAL LOW COMPLEX 20 MIN: CPT | Performed by: PHYSICAL THERAPIST

## 2020-02-24 PROCEDURE — 97110 THERAPEUTIC EXERCISES: CPT | Performed by: PHYSICAL THERAPIST

## 2020-02-24 NOTE — PROGRESS NOTES
PT Evaluation     Today's date: 2020  Patient name: Jennifer Ferreira  : 1970  MRN: [id number]  Referring provider: Letty Myers MD  Dx:   Encounter Diagnosis     ICD-10-CM    1  Superior glenoid labrum lesion of left shoulder, subsequent encounter S43 432D Ambulatory referral to Physical Therapy                  Assessment  Assessment details: Jennifer Ferreira is a 54 y o  male was evaluated on 2020  for Superior glenoid labrum lesion of left shoulder, subsequent encounter  (primary encounter diagnosis)  Jennifer Ferreira has the above listed impairments resulting in functional deficits and negative impact to quality of life  Patient is appropriate for skilled PT intervention to promote maximal return to function and patient specific goals  Patient agrees with outlined treatment plan and all questions were answered to their satisfaction  Impairments: abnormal muscle firing, abnormal muscle tone, abnormal or restricted ROM, impaired physical strength, lacks appropriate home exercise program and pain with function  Understanding of Dx/Px/POC: good   Prognosis: good    Goals  Patient will successfully transition to home exercise program   Patient will be able to manage symptoms independently      Patient will return to gym routine  Patient will have no limitation in daily activity including dressing and self care     Plan  Patient would benefit from: skilled PT  Referral necessary: No  Planned modality interventions: thermotherapy: hydrocollator packs  Planned therapy interventions: home exercise program, manual therapy, neuromuscular re-education, patient education, functional ROM exercises, strengthening, stretching, joint mobilization, graded activity, graded exercise, therapeutic exercise, body mechanics training, motor coordination training and activity modification  Frequency: 2x week  Duration in weeks: 12  Treatment plan discussed with: patient        Subjective Evaluation    History of Present Illness  Mechanism of injury: Celina Calabrese is a 54 y o  male presenting to therapy s/p L shoulder superior and posterior labral repair on 2/10/20  He reports minimal pain and has been doing light mobility at home without issue  He is a  and works from home/desk without issue  Would like to get back to his normal weight lifting routine  Has history of dislocation on L side and 4 labral related surgeries    Never felt like he got to 100%         Objective     Cervical/Thoracic Screen   Cervical range of motion within normal limits  Thoracic range of motion within normal limits    Active Range of Motion     Additional Active Range of Motion Details  AROM NT     Passive Range of Motion   Left Shoulder   Flexion: 90 degrees   External rotation 0°: 30 degrees     Strength/Myotome Testing     Additional Strength Details  Strength NT       Flowsheet Rows      Most Recent Value   PT/OT G-Codes   Current Score  38   Projected Score  69             Precautions: Protocol        Manual              PROM                                                                     Exercise Diary                                                                                                                                                                                                                                                                                      Modalities

## 2020-02-26 ENCOUNTER — OFFICE VISIT (OUTPATIENT)
Dept: PHYSICAL THERAPY | Facility: MEDICAL CENTER | Age: 50
End: 2020-02-26
Payer: COMMERCIAL

## 2020-02-26 DIAGNOSIS — S43.432D SUPERIOR GLENOID LABRUM LESION OF LEFT SHOULDER, SUBSEQUENT ENCOUNTER: Primary | ICD-10-CM

## 2020-02-26 PROCEDURE — 97140 MANUAL THERAPY 1/> REGIONS: CPT | Performed by: PHYSICAL THERAPIST

## 2020-02-26 PROCEDURE — 97110 THERAPEUTIC EXERCISES: CPT | Performed by: PHYSICAL THERAPIST

## 2020-02-26 NOTE — PROGRESS NOTES
Daily Note     Today's date: 2020  Patient name: Quentin Shea  : 1970  MRN: [id number]  Referring provider: Asia Mccabe MD  Dx:   Encounter Diagnosis     ICD-10-CM    1  Superior glenoid labrum lesion of left shoulder, subsequent encounter S43 432D                   Subjective: Khloe Ferreira reports that his home program has been going well, no issues       Objective: See treatment diary below      Assessment: Tolerated treatment well  Patient exhibited good technique with therapeutic exercises and would benefit from continued PT      Plan: Continue per plan of care        Precautions: Protocol        Manual              PROM AF            Seated thoracic 1arm thrust AF                                                       Exercise Diary              Scap retractions 30            Abd Iso 5 sec  X15            UT stretch 20 sec  X5                                                                                                                                                                                                                                             Modalities

## 2020-03-02 ENCOUNTER — OFFICE VISIT (OUTPATIENT)
Dept: PHYSICAL THERAPY | Facility: MEDICAL CENTER | Age: 50
End: 2020-03-02
Payer: COMMERCIAL

## 2020-03-02 DIAGNOSIS — S43.432D SUPERIOR GLENOID LABRUM LESION OF LEFT SHOULDER, SUBSEQUENT ENCOUNTER: Primary | ICD-10-CM

## 2020-03-02 PROCEDURE — 97140 MANUAL THERAPY 1/> REGIONS: CPT | Performed by: PHYSICAL THERAPIST

## 2020-03-02 NOTE — PROGRESS NOTES
Daily Note     Today's date: 3/2/2020  Patient name: Judith Falcon  : 1970  MRN: [id number]  Referring provider: Joseph Medrano MD  Dx:   Encounter Diagnosis     ICD-10-CM    1  Superior glenoid labrum lesion of left shoulder, subsequent encounter S43 432D                   Subjective: Marshel Section reports that he has been doing well with no new issues       Objective: See treatment diary below      Assessment: Tolerated treatment well  Patient progressing his mobility well and no increases in pain      Plan: Continue per plan of care        Precautions: Protocol        Manual  2/26 3/2           PROM AF AF           Seated thoracic 1arm thrust AF AF                                                      Exercise Diary              Scap retractions 30            Abd Iso 5 sec  X15            UT stretch 20 sec  X5                                                                                                                                                                                                                                             Modalities

## 2020-03-04 ENCOUNTER — OFFICE VISIT (OUTPATIENT)
Dept: PHYSICAL THERAPY | Facility: MEDICAL CENTER | Age: 50
End: 2020-03-04
Payer: COMMERCIAL

## 2020-03-04 DIAGNOSIS — S43.432D SUPERIOR GLENOID LABRUM LESION OF LEFT SHOULDER, SUBSEQUENT ENCOUNTER: Primary | ICD-10-CM

## 2020-03-04 PROCEDURE — 97140 MANUAL THERAPY 1/> REGIONS: CPT | Performed by: PHYSICAL THERAPIST

## 2020-03-04 NOTE — PROGRESS NOTES
Daily Note     Today's date: 3/4/2020  Patient name: Jocelyn Vivar  : 1970  MRN: [id number]  Referring provider: Ángel Philip MD  Dx:   Encounter Diagnosis     ICD-10-CM    1  Superior glenoid labrum lesion of left shoulder, subsequent encounter S43 432D                   Subjective: Nany Merino reports that he is not having any issues, continues to utilize sling       Objective: See treatment diary below      Assessment: Tolerated treatment well  Patient progressing well at current phase of rehabilitation       Plan: Continue per plan of care        Precautions: Protocol        Manual  2/26 3/2 3/4          PROM AF AF AF          Seated thoracic 1arm thrust AF AF AF                                                     Exercise Diary              Scap retractions 30            Abd Iso 5 sec  X15            UT stretch 20 sec  X5                                                                                                                                                                                                                                             Modalities

## 2020-03-09 ENCOUNTER — APPOINTMENT (OUTPATIENT)
Dept: PHYSICAL THERAPY | Facility: MEDICAL CENTER | Age: 50
End: 2020-03-09
Payer: COMMERCIAL

## 2020-03-09 ENCOUNTER — OFFICE VISIT (OUTPATIENT)
Dept: PHYSICAL THERAPY | Facility: MEDICAL CENTER | Age: 50
End: 2020-03-09
Payer: COMMERCIAL

## 2020-03-09 DIAGNOSIS — S43.432D SUPERIOR GLENOID LABRUM LESION OF LEFT SHOULDER, SUBSEQUENT ENCOUNTER: Primary | ICD-10-CM

## 2020-03-09 PROCEDURE — 97110 THERAPEUTIC EXERCISES: CPT | Performed by: PHYSICAL THERAPIST

## 2020-03-09 PROCEDURE — 97140 MANUAL THERAPY 1/> REGIONS: CPT | Performed by: PHYSICAL THERAPIST

## 2020-03-09 NOTE — PROGRESS NOTES
Daily Note     Today's date: 3/9/2020  Patient name: Alexey Shrestha  : 1970  MRN: [id number]  Referring provider: Sushma De La Torre MD  Dx:   Encounter Diagnosis     ICD-10-CM    1  Superior glenoid labrum lesion of left shoulder, subsequent encounter S43 432D                   Subjective: Soham Quinones reports that he is feeling good,       Objective: See treatment diary below      Assessment: Tolerated treatment well  Patient with good tolerance to passive mobility and current phase of protocol  Progress as able      Plan: Continue per plan of care        Precautions: Protocol        Manual  2/26 3/2 3/4 3/9         PROM AF AF AF AF         Seated thoracic 1arm thrust AF AF AF AF                                                    Exercise Diary              Scap retractions 30   30         Abd Iso 5 sec  X15            UT stretch 20 sec  X5            Scapular isometrics    3 sec  X20                                                                                                                                                                                                                             Modalities

## 2020-03-11 ENCOUNTER — OFFICE VISIT (OUTPATIENT)
Dept: PHYSICAL THERAPY | Facility: MEDICAL CENTER | Age: 50
End: 2020-03-11
Payer: COMMERCIAL

## 2020-03-11 DIAGNOSIS — S43.432D SUPERIOR GLENOID LABRUM LESION OF LEFT SHOULDER, SUBSEQUENT ENCOUNTER: Primary | ICD-10-CM

## 2020-03-11 PROCEDURE — 97112 NEUROMUSCULAR REEDUCATION: CPT | Performed by: PHYSICAL THERAPIST

## 2020-03-11 PROCEDURE — 97140 MANUAL THERAPY 1/> REGIONS: CPT | Performed by: PHYSICAL THERAPIST

## 2020-03-11 PROCEDURE — 97110 THERAPEUTIC EXERCISES: CPT | Performed by: PHYSICAL THERAPIST

## 2020-03-11 NOTE — PROGRESS NOTES
Daily Note     Today's date: 3/11/2020  Patient name: Argenis Macario  : 1970  MRN: [id number]  Referring provider: Marcos Sue MD  Dx:   Encounter Diagnosis     ICD-10-CM    1  Superior glenoid labrum lesion of left shoulder, subsequent encounter S43 432D                   Subjective: Javier Huizar reports that he is doing well, not having intense soreness       Objective: See treatment diary below      Assessment: Tolerated treatment well  Patient exhibited good technique with therapeutic exercises and would benefit from continued PT      Plan: Continue per plan of care        Precautions: Protocol        Manual  2/26 3/2 3/4 3/9 3/11        PROM AF AF AF AF AF        Seated thoracic 1arm thrust AF AF AF AF AF                                                   Exercise Diary              Scap retractions 30   30 30        Abd Iso 5 sec  X15            UT stretch 20 sec  X5    20 sec  X3        Scapular isometrics    3 sec  X20         Supine AAROM     20                                                                                                                                                                                                               Modalities

## 2020-03-16 ENCOUNTER — OFFICE VISIT (OUTPATIENT)
Dept: PHYSICAL THERAPY | Facility: MEDICAL CENTER | Age: 50
End: 2020-03-16
Payer: COMMERCIAL

## 2020-03-16 DIAGNOSIS — S43.432D SUPERIOR GLENOID LABRUM LESION OF LEFT SHOULDER, SUBSEQUENT ENCOUNTER: Primary | ICD-10-CM

## 2020-03-16 PROCEDURE — 97140 MANUAL THERAPY 1/> REGIONS: CPT | Performed by: PHYSICAL THERAPIST

## 2020-03-16 PROCEDURE — 97110 THERAPEUTIC EXERCISES: CPT | Performed by: PHYSICAL THERAPIST

## 2020-03-16 PROCEDURE — 97112 NEUROMUSCULAR REEDUCATION: CPT | Performed by: PHYSICAL THERAPIST

## 2020-03-16 NOTE — PROGRESS NOTES
Daily Note     Today's date: 3/16/2020  Patient name: Karly Schumacher  : 1970  MRN: [id number]  Referring provider: Carl Walden MD  Dx:   Encounter Diagnosis     ICD-10-CM    1  Superior glenoid labrum lesion of left shoulder, subsequent encounter S43 432D                   Subjective: Melinda Paredes reports that he is doing well and not having much soreness       Objective: See treatment diary below      Assessment: Tolerated treatment well  Patient tolerating progression well and without increase in pain  Continue along protocol       Plan: Continue per plan of care        Precautions: Protocol        Manual  2/26 3/2 3/4 3/9 3/11 3/16       PROM AF AF AF AF AF AF       Seated thoracic 1arm thrust AF AF AF AF AF AF                                                  Exercise Diary              Scap retractions 30   30 30 30       Abd Iso 5 sec  X15            UT stretch 20 sec  X5    20 sec  X3 20 sec  X3       Scapular isometrics    3 sec  X20         Supine AAROM     20 20       Prone row      30       Prone shoulder extension      30                                                                                                                                                                                    Modalities

## 2020-03-18 ENCOUNTER — OFFICE VISIT (OUTPATIENT)
Dept: PHYSICAL THERAPY | Facility: MEDICAL CENTER | Age: 50
End: 2020-03-18
Payer: COMMERCIAL

## 2020-03-18 DIAGNOSIS — S43.432D SUPERIOR GLENOID LABRUM LESION OF LEFT SHOULDER, SUBSEQUENT ENCOUNTER: Primary | ICD-10-CM

## 2020-03-18 PROCEDURE — 97140 MANUAL THERAPY 1/> REGIONS: CPT | Performed by: PHYSICAL THERAPIST

## 2020-03-18 PROCEDURE — 97112 NEUROMUSCULAR REEDUCATION: CPT | Performed by: PHYSICAL THERAPIST

## 2020-03-18 PROCEDURE — 97110 THERAPEUTIC EXERCISES: CPT | Performed by: PHYSICAL THERAPIST

## 2020-03-18 NOTE — PROGRESS NOTES
Daily Note     Today's date: 3/18/2020  Patient name: Ac Fragoso  : 1970  MRN: [id number]  Referring provider: Alexis Montgomery MD  Dx:   Encounter Diagnosis     ICD-10-CM    1  Superior glenoid labrum lesion of left shoulder, subsequent encounter S43 432D                   Subjective: Abner Appiah offers no complaints at this time, states he is feeling good  Objective: See treatment diary below      Assessment: Patient is progressing appropriately at this time per protocol  He demonstrates no significant loss of motion at this time  He demonstrates good technique with therapeutic exercise and able to perform pain free  Patient will benefit from continued PT services for appropriate progression and selection and of therapeutic exercise as he continues to progress through MD protocol each week  Plan: Continue per plan of care        Precautions: Protocol        Manual  2/26 3/2 3/4 3/9 3/11 3/16 3/18      PROM AF AF AF AF AF AF CK      Seated thoracic 1arm thrust AF AF AF AF AF AF np                                                 Exercise Diary              Scap retractions 30   30 30 30 30      Abd Iso 5 sec  X15            UT stretch 20 sec  X5    20 sec  X3 20 sec  X3 20 sec x3      Scapular isometrics    3 sec  X20         Supine AAROM     20 20 20      Prone row      30 30      Prone shoulder extension      30 30                                                                                                                                                                                   Modalities

## 2020-03-23 ENCOUNTER — OFFICE VISIT (OUTPATIENT)
Dept: PHYSICAL THERAPY | Facility: MEDICAL CENTER | Age: 50
End: 2020-03-23
Payer: COMMERCIAL

## 2020-03-23 DIAGNOSIS — S43.432D SUPERIOR GLENOID LABRUM LESION OF LEFT SHOULDER, SUBSEQUENT ENCOUNTER: Primary | ICD-10-CM

## 2020-03-23 PROCEDURE — 97112 NEUROMUSCULAR REEDUCATION: CPT | Performed by: PHYSICAL THERAPIST

## 2020-03-23 PROCEDURE — 97140 MANUAL THERAPY 1/> REGIONS: CPT | Performed by: PHYSICAL THERAPIST

## 2020-03-23 PROCEDURE — 97110 THERAPEUTIC EXERCISES: CPT | Performed by: PHYSICAL THERAPIST

## 2020-03-23 NOTE — PROGRESS NOTES
Daily Note     Today's date: 3/23/2020  Patient name: Coral Jacobo  : 1970  MRN: [id number]  Referring provider: Yaneth Cat MD  Dx:   Encounter Diagnosis     ICD-10-CM    1  Superior glenoid labrum lesion of left shoulder, subsequent encounter S43 432D                   Subjective: Sarah Arana reports that he is doing well and shoulder felt good this weekend       Objective: See treatment diary below      Assessment: Tolerated treatment well  Patient tolerating active and active assisted motion well  Porgressing well       Plan: Continue per plan of care        Precautions: Protocol        Manual  2/26 3/2 3/4 3/9 3/11 3/16 3/18 3/23     PROM AF AF AF AF AF AF CK AF     Seated thoracic 1arm thrust AF AF AF AF AF AF np AF     Scapular mobilizations        AF                                   Exercise Diary              Scap retractions 30   30 30 30 30 30     Abd Iso 5 sec  X15            UT stretch 20 sec  X5    20 sec  X3 20 sec  X3 20 sec x3 20 sec  X3     Scapular isometrics    3 sec  X20         Supine AAROM     20 20 20 30     Prone row      30 30 30     Prone shoulder extension      30 30 30     Supine ABC's        2X      SL ER        3X10                                                                                                                                                        Modalities

## 2020-03-25 ENCOUNTER — OFFICE VISIT (OUTPATIENT)
Dept: PHYSICAL THERAPY | Facility: MEDICAL CENTER | Age: 50
End: 2020-03-25
Payer: COMMERCIAL

## 2020-03-25 DIAGNOSIS — S43.432D SUPERIOR GLENOID LABRUM LESION OF LEFT SHOULDER, SUBSEQUENT ENCOUNTER: Primary | ICD-10-CM

## 2020-03-25 PROCEDURE — 97140 MANUAL THERAPY 1/> REGIONS: CPT | Performed by: PHYSICAL THERAPIST

## 2020-03-25 PROCEDURE — 97112 NEUROMUSCULAR REEDUCATION: CPT | Performed by: PHYSICAL THERAPIST

## 2020-03-25 PROCEDURE — 97110 THERAPEUTIC EXERCISES: CPT | Performed by: PHYSICAL THERAPIST

## 2020-03-25 NOTE — PROGRESS NOTES
Daily Note     Today's date: 3/25/2020  Patient name: Mirian Haskins  : 1970  MRN: [id number]  Referring provider: Cony Marr MD  Dx:   Encounter Diagnosis     ICD-10-CM    1  Superior glenoid labrum lesion of left shoulder, subsequent encounter S43 432D                   Subjective:   Gold Lovett reports that he is doing well and not having much soreness in shoulder       Objective: See treatment diary below      Assessment: Tolerated treatment well  Patient tolerating progression of active range and initial muscular conditioning without issue        Plan: Continue per plan of care        Precautions: Protocol        Manual  2/26 3/2 3/4 3/9 3/11 3/16 3/18 3/23 3/25    PROM AF AF AF AF AF AF CK AF AF    Seated thoracic 1arm thrust AF AF AF AF AF AF np AF AF    Scapular mobilizations        AF AF                                  Exercise Diary              Scap retractions 30   30 30 30 30 30 30  red    Abd Iso 5 sec  X15            UT stretch 20 sec  X5    20 sec  X3 20 sec  X3 20 sec x3 20 sec  X3     Scapular isometrics    3 sec  X20         Supine AAROM     20 20 20 30 30    Prone row      30 30 30 2# 30    Prone shoulder extension      30 30 30 2#  30    Supine ABC's        2X  2    SL ER        3X10 3X10                                                                                                                                                       Modalities

## 2020-03-30 ENCOUNTER — OFFICE VISIT (OUTPATIENT)
Dept: PHYSICAL THERAPY | Facility: MEDICAL CENTER | Age: 50
End: 2020-03-30
Payer: COMMERCIAL

## 2020-03-30 DIAGNOSIS — S43.432D SUPERIOR GLENOID LABRUM LESION OF LEFT SHOULDER, SUBSEQUENT ENCOUNTER: Primary | ICD-10-CM

## 2020-03-30 PROCEDURE — 97110 THERAPEUTIC EXERCISES: CPT | Performed by: PHYSICAL THERAPIST

## 2020-03-30 PROCEDURE — 97140 MANUAL THERAPY 1/> REGIONS: CPT | Performed by: PHYSICAL THERAPIST

## 2020-03-30 PROCEDURE — 97530 THERAPEUTIC ACTIVITIES: CPT | Performed by: PHYSICAL THERAPIST

## 2020-03-30 NOTE — PROGRESS NOTES
Daily Note     Today's date: 3/30/2020  Patient name: Jennifer Ferreira  : 1970  MRN: [id number]  Referring provider: Letty Myers MD  Dx:   Encounter Diagnosis     ICD-10-CM    1  Superior glenoid labrum lesion of left shoulder, subsequent encounter S43 432D                   Subjective: Bethany Dockery reports that he is feeling very good and not having issues      Objective: See treatment diary below      Assessment: Tolerated treatment well  Patient progressing well and motion is nearing full at this time  No pain with initial neuromuscular re-education and scapular motor control       Plan: Continue per plan of care        Precautions: Protocol        Manual  2/26 3/2 3/4 3/9 3/11 3/16 3/18 3/23 3/25 3/30   PROM AF AF AF AF AF AF CK AF AF AF   Seated thoracic 1arm thrust AF AF AF AF AF AF np AF AF AF   Scapular mobilizations        AF AF AF                                 Exercise Diary              Scap retractions 30   30 30 30 30 30 30  red 30 green   Abd Iso 5 sec  X15            UT stretch 20 sec  X5    20 sec  X3 20 sec  X3 20 sec x3 20 sec  X3     Scapular isometrics    3 sec  X20         Supine AAROM     20 20 20 30 30 30   Prone row      30 30 30 2# 30 2#  3x10   Prone shoulder extension      30 30 30 2#  30 2#  3X10   Supine ABC's        2X  2 2   SL ER        3X10 3X10 3X10   TB extension          [id number]   Scapular wall slides with reach          2X10                                                                                                                            Modalities

## 2020-03-31 ENCOUNTER — OFFICE VISIT (OUTPATIENT)
Dept: OBGYN CLINIC | Facility: MEDICAL CENTER | Age: 50
End: 2020-03-31

## 2020-03-31 VITALS
BODY MASS INDEX: 28 KG/M2 | HEART RATE: 65 BPM | SYSTOLIC BLOOD PRESSURE: 113 MMHG | HEIGHT: 71 IN | DIASTOLIC BLOOD PRESSURE: 74 MMHG | WEIGHT: 200 LBS | TEMPERATURE: 96.8 F

## 2020-03-31 DIAGNOSIS — Z98.890 STATUS POST LABRAL REPAIR OF SHOULDER: Primary | ICD-10-CM

## 2020-03-31 PROCEDURE — 3008F BODY MASS INDEX DOCD: CPT | Performed by: ORTHOPAEDIC SURGERY

## 2020-03-31 PROCEDURE — 99024 POSTOP FOLLOW-UP VISIT: CPT | Performed by: ORTHOPAEDIC SURGERY

## 2020-03-31 NOTE — PROGRESS NOTES
Orthopaedic Surgery - Office Note  Red Cavazos (40 y o  male)   : 1970   MRN: [id number]  Encounter Date: 3/31/2020    Chief Complaint   Patient presents with    Left Shoulder - Follow-up       Assessment / Plan  Status post left shoulder arthroscopy with superior and posterior labral repairs on 02/10/2020    · Continue physical therapy exercises  · Resume activities as tolerated  Return in about 6 weeks (around 2020)  History of Present Illness  Red Cavazos is a 54 y o  male who presents to the office status post left shoulder arthroscopy with superior and posterior labral repairs on 02/10/2020  He continues to attend physical therapy  He notes occasional popping and soreness  He denies any numbness or tingling  He is happy with his progress thus far  He has no new complaints or concerns today in the office  Review of Systems  Pertinent items are noted in HPI  All other systems were reviewed and are negative  Physical Exam  /74   Pulse 65   Temp (!) 96 8 °F (36 °C)   Ht 5' 11" (1 803 m)   Wt 90 7 kg (200 lb)   BMI 27 89 kg/m²   Cons: Appears well  No apparent distress  Psych: Alert  Oriented x3  Mood and affect normal   Eyes: PERRLA, EOMI  Resp: Normal effort  No audible wheezing or stridor  CV: Palpable pulse  No discernable arrhythmia  No LE edema  Lymph:  No palpable cervical, axillary, or inguinal lymphadenopathy  Skin: Warm  No palpable masses  No visible lesions  Neuro: Normal muscle tone  Normal and symmetric DTR's  Left Shoulder Exam  Alignment / Posture:  Normal shoulder posture  Inspection:  No swelling  No erythema  Incisions healed  Palpation:  No tenderness  No effusion  ROM:  Shoulder  degrees  Shoulder ER 60 degrees  Shoulder IR T8  Strength:  Shoulder FE and ER 5-/5  Stability:  (-) Jerk test   Tests: (+) Jones  (-) Belly press  (-) Speed  (-) [place]  Neurovascular:  Sensation intact in Ax/R/M/U nerve distributions   Palpable radial pulse  Studies Reviewed  No studies to review    Procedures  No procedures today  Medical, Surgical, Family, and Social History  The patient's medical history, family history, and social history, were reviewed and updated as appropriate      Past Medical History:   Diagnosis Date    Anxiety     "general work related"    Dislocated shoulder     originally approx 25 yrs ago//left    Exercises 3 to 4 times per week     4-5x/week    Hyperlipidemia     borderline    Impaired fasting glucose     Labral tear of shoulder     left with occas pain    Olecranon bursitis, left elbow     had surgery    Shoulder arthritis     left    Vitamin D deficiency     pt not sure    Wears glasses     reading       Past Surgical History:   Procedure Laterality Date    ARTHROSCOPIC BICEPS TENODESIS Left 2018    Procedure: SHOULDER ARTHROSCOPY WITH BICEPS TENODESIS;  Surgeon: Brayan Dukes MD;  Location: [address] [place] [address];  Service: Orthopedics    FL INJECTION LEFT SHOULDER (ARTHROGRAM)  2020    [place] REMOVAL OF ELBOW BURSA Left 10/3/2019    Procedure: EXCISION BURSA OLECRANON ELBOW;  Surgeon: Trice Kenney MD;  Location: [address] [place] [address] MAIN OR;  Service: Orthopedics    CO SHLDR ARTHROSCOP,SURG,REPAIR,SLAP LESION Left 2/10/2020    Procedure: ARTHROSCOPY SHOULDER, posterior labral repair;  Surgeon: Concepcion Spears MD;  Location: [facility];  Service: Orthopedics    ROTATOR CUFF REPAIR      ROTATOR CUFF REPAIR Left     x2 last one     SEPTOPLASTY      WISDOM TOOTH EXTRACTION         Family History   Problem Relation Age of Onset    No Known Problems Mother     No Known Problems Father        Social History     Occupational History    Not on file   Tobacco Use    Smoking status: Former Smoker     Years: 15 00     Last attempt to quit: 2007     Years since quittin 1    Smokeless tobacco: Never Used   Substance and Sexual Activity    Alcohol use: Yes     Comment: socially    Drug use: No    Sexual activity: Yes     Partners: Female       No Known Allergies      Current Outpatient Medications:     acyclovir (ZOVIRAX) 5 % ointment, Apply topically every 4 (four) hours (Patient taking differently: Apply topically as needed ), Disp: 15 g, Rfl: 0    buPROPion (WELLBUTRIN XL) 300 mg 24 hr tablet, Take 1 tablet (300 mg total) by mouth daily, Disp: 90 tablet, Rfl: 3    Cholecalciferol (VITAMIN D) 2000 units CAPS, Take by mouth, Disp: , Rfl:     glucosamine-chondroitin 500-400 MG tablet, Take 1 tablet by mouth every morning, Disp: , Rfl:     Multiple Vitamin (MULTIVITAMIN) tablet, Take 1 tablet by mouth daily, Disp: , Rfl:     naproxen (NAPROSYN) 500 mg tablet, Take 1 tablet (500 mg total) by mouth 2 (two) times a day with meals, Disp: 60 tablet, Rfl: 0    Naproxen Sodium (ALEVE) 220 MG CAPS, Take by mouth as needed, Disp: , Rfl:     Omega-3 Fatty Acids (FISH OIL) 1,000 mg, Take 1,000 mg by mouth daily, Disp: , Rfl:     ondansetron (ZOFRAN-ODT) 4 mg disintegrating tablet, Take 1 tablet (4 mg total) by mouth every 8 (eight) hours as needed for nausea or vomiting, Disp: 15 tablet, Rfl: 0    Testosterone 1 62 % GEL, Place 2 Act on the skin daily, Disp: 75 g, Rfl: 0    valACYclovir (VALTREX) 1,000 mg tablet, Take 2 tablets (2,000 mg total) by mouth 2 (two) times a day as needed (cold sore) for up to 1 day, Disp: 30 tablet, Rfl: 1      [employer]    I,:   Jo-Ann You am acting as a scribe while in the presence of the attending physician :        I,:   Mark Mcelroy MD personally performed the services described in this documentation    as scribed in my presence :

## 2020-04-01 ENCOUNTER — APPOINTMENT (OUTPATIENT)
Dept: PHYSICAL THERAPY | Facility: MEDICAL CENTER | Age: 50
End: 2020-04-01
Payer: COMMERCIAL

## 2020-04-03 ENCOUNTER — OFFICE VISIT (OUTPATIENT)
Dept: PHYSICAL THERAPY | Facility: MEDICAL CENTER | Age: 50
End: 2020-04-03
Payer: COMMERCIAL

## 2020-04-03 DIAGNOSIS — S43.432D SUPERIOR GLENOID LABRUM LESION OF LEFT SHOULDER, SUBSEQUENT ENCOUNTER: Primary | ICD-10-CM

## 2020-04-03 PROCEDURE — 97110 THERAPEUTIC EXERCISES: CPT | Performed by: PHYSICAL THERAPIST

## 2020-04-03 PROCEDURE — 97112 NEUROMUSCULAR REEDUCATION: CPT | Performed by: PHYSICAL THERAPIST

## 2020-04-03 PROCEDURE — 97140 MANUAL THERAPY 1/> REGIONS: CPT | Performed by: PHYSICAL THERAPIST

## 2020-04-04 DIAGNOSIS — R79.89 LOW TESTOSTERONE IN MALE: ICD-10-CM

## 2020-04-04 DIAGNOSIS — N52.9 ERECTILE DYSFUNCTION, UNSPECIFIED ERECTILE DYSFUNCTION TYPE: ICD-10-CM

## 2020-04-06 ENCOUNTER — APPOINTMENT (OUTPATIENT)
Dept: PHYSICAL THERAPY | Facility: MEDICAL CENTER | Age: 50
End: 2020-04-06
Payer: COMMERCIAL

## 2020-04-06 RX ORDER — TESTOSTERONE 16.2 MG/G
GEL TRANSDERMAL
Qty: 75 G | Refills: 1 | Status: SHIPPED | OUTPATIENT
Start: 2020-04-06 | End: 2020-06-18 | Stop reason: SDUPTHER

## 2020-04-08 ENCOUNTER — APPOINTMENT (OUTPATIENT)
Dept: PHYSICAL THERAPY | Facility: MEDICAL CENTER | Age: 50
End: 2020-04-08
Payer: COMMERCIAL

## 2020-04-10 ENCOUNTER — OFFICE VISIT (OUTPATIENT)
Dept: PHYSICAL THERAPY | Facility: MEDICAL CENTER | Age: 50
End: 2020-04-10
Payer: COMMERCIAL

## 2020-04-10 DIAGNOSIS — S43.432D SUPERIOR GLENOID LABRUM LESION OF LEFT SHOULDER, SUBSEQUENT ENCOUNTER: Primary | ICD-10-CM

## 2020-04-10 PROCEDURE — 97112 NEUROMUSCULAR REEDUCATION: CPT | Performed by: PHYSICAL THERAPIST

## 2020-04-10 PROCEDURE — 97110 THERAPEUTIC EXERCISES: CPT | Performed by: PHYSICAL THERAPIST

## 2020-04-10 PROCEDURE — 97140 MANUAL THERAPY 1/> REGIONS: CPT | Performed by: PHYSICAL THERAPIST

## 2020-04-13 ENCOUNTER — OFFICE VISIT (OUTPATIENT)
Dept: PHYSICAL THERAPY | Facility: MEDICAL CENTER | Age: 50
End: 2020-04-13
Payer: COMMERCIAL

## 2020-04-13 DIAGNOSIS — S43.432D SUPERIOR GLENOID LABRUM LESION OF LEFT SHOULDER, SUBSEQUENT ENCOUNTER: Primary | ICD-10-CM

## 2020-04-13 PROCEDURE — 97110 THERAPEUTIC EXERCISES: CPT | Performed by: PHYSICAL THERAPIST

## 2020-04-13 PROCEDURE — 97112 NEUROMUSCULAR REEDUCATION: CPT | Performed by: PHYSICAL THERAPIST

## 2020-04-13 PROCEDURE — 97140 MANUAL THERAPY 1/> REGIONS: CPT | Performed by: PHYSICAL THERAPIST

## 2020-04-17 ENCOUNTER — OFFICE VISIT (OUTPATIENT)
Dept: PHYSICAL THERAPY | Facility: MEDICAL CENTER | Age: 50
End: 2020-04-17
Payer: COMMERCIAL

## 2020-04-17 DIAGNOSIS — S43.432D SUPERIOR GLENOID LABRUM LESION OF LEFT SHOULDER, SUBSEQUENT ENCOUNTER: Primary | ICD-10-CM

## 2020-04-17 PROCEDURE — 97112 NEUROMUSCULAR REEDUCATION: CPT | Performed by: PHYSICAL THERAPIST

## 2020-04-17 PROCEDURE — 97110 THERAPEUTIC EXERCISES: CPT | Performed by: PHYSICAL THERAPIST

## 2020-04-17 PROCEDURE — 97140 MANUAL THERAPY 1/> REGIONS: CPT | Performed by: PHYSICAL THERAPIST

## 2020-04-20 ENCOUNTER — OFFICE VISIT (OUTPATIENT)
Dept: PHYSICAL THERAPY | Facility: MEDICAL CENTER | Age: 50
End: 2020-04-20
Payer: COMMERCIAL

## 2020-04-20 DIAGNOSIS — S43.432D SUPERIOR GLENOID LABRUM LESION OF LEFT SHOULDER, SUBSEQUENT ENCOUNTER: Primary | ICD-10-CM

## 2020-04-20 PROCEDURE — 97110 THERAPEUTIC EXERCISES: CPT | Performed by: PHYSICAL THERAPIST

## 2020-04-20 PROCEDURE — 97140 MANUAL THERAPY 1/> REGIONS: CPT | Performed by: PHYSICAL THERAPIST

## 2020-04-20 PROCEDURE — 97112 NEUROMUSCULAR REEDUCATION: CPT | Performed by: PHYSICAL THERAPIST

## 2020-04-21 ENCOUNTER — TELEMEDICINE (OUTPATIENT)
Dept: FAMILY MEDICINE CLINIC | Facility: CLINIC | Age: 50
End: 2020-04-21
Payer: COMMERCIAL

## 2020-04-21 VITALS — TEMPERATURE: 98.4 F | WEIGHT: 208 LBS | HEIGHT: 71 IN | BODY MASS INDEX: 29.12 KG/M2

## 2020-04-21 DIAGNOSIS — F41.8 DEPRESSION WITH ANXIETY: ICD-10-CM

## 2020-04-21 DIAGNOSIS — F41.0 PANIC ATTACK: ICD-10-CM

## 2020-04-21 DIAGNOSIS — R79.89 LOW TESTOSTERONE IN MALE: Primary | ICD-10-CM

## 2020-04-21 DIAGNOSIS — E78.2 MIXED HYPERLIPIDEMIA: ICD-10-CM

## 2020-04-21 PROCEDURE — 99214 OFFICE O/P EST MOD 30 MIN: CPT | Performed by: FAMILY MEDICINE

## 2020-04-21 PROCEDURE — 3008F BODY MASS INDEX DOCD: CPT | Performed by: FAMILY MEDICINE

## 2020-04-21 RX ORDER — PROPRANOLOL HYDROCHLORIDE 20 MG/1
20 TABLET ORAL DAILY PRN
Qty: 30 TABLET | Refills: 1 | Status: SHIPPED | OUTPATIENT
Start: 2020-04-21 | End: 2020-10-08 | Stop reason: SDUPTHER

## 2020-04-24 ENCOUNTER — OFFICE VISIT (OUTPATIENT)
Dept: PHYSICAL THERAPY | Facility: MEDICAL CENTER | Age: 50
End: 2020-04-24
Payer: COMMERCIAL

## 2020-04-24 DIAGNOSIS — S43.432D SUPERIOR GLENOID LABRUM LESION OF LEFT SHOULDER, SUBSEQUENT ENCOUNTER: Primary | ICD-10-CM

## 2020-04-24 PROCEDURE — 97140 MANUAL THERAPY 1/> REGIONS: CPT | Performed by: PHYSICAL THERAPIST

## 2020-04-24 PROCEDURE — 97110 THERAPEUTIC EXERCISES: CPT | Performed by: PHYSICAL THERAPIST

## 2020-04-24 PROCEDURE — 97112 NEUROMUSCULAR REEDUCATION: CPT | Performed by: PHYSICAL THERAPIST

## 2020-04-27 ENCOUNTER — OFFICE VISIT (OUTPATIENT)
Dept: PHYSICAL THERAPY | Facility: MEDICAL CENTER | Age: 50
End: 2020-04-27
Payer: COMMERCIAL

## 2020-04-27 DIAGNOSIS — S43.432D SUPERIOR GLENOID LABRUM LESION OF LEFT SHOULDER, SUBSEQUENT ENCOUNTER: Primary | ICD-10-CM

## 2020-04-27 PROCEDURE — 97110 THERAPEUTIC EXERCISES: CPT | Performed by: PHYSICAL THERAPIST

## 2020-04-27 PROCEDURE — 97112 NEUROMUSCULAR REEDUCATION: CPT | Performed by: PHYSICAL THERAPIST

## 2020-05-01 ENCOUNTER — OFFICE VISIT (OUTPATIENT)
Dept: PHYSICAL THERAPY | Facility: MEDICAL CENTER | Age: 50
End: 2020-05-01
Payer: COMMERCIAL

## 2020-05-01 DIAGNOSIS — S43.432D SUPERIOR GLENOID LABRUM LESION OF LEFT SHOULDER, SUBSEQUENT ENCOUNTER: Primary | ICD-10-CM

## 2020-05-01 PROCEDURE — 97112 NEUROMUSCULAR REEDUCATION: CPT | Performed by: PHYSICAL THERAPIST

## 2020-05-01 PROCEDURE — 97110 THERAPEUTIC EXERCISES: CPT | Performed by: PHYSICAL THERAPIST

## 2020-05-01 PROCEDURE — 97140 MANUAL THERAPY 1/> REGIONS: CPT | Performed by: PHYSICAL THERAPIST

## 2020-05-04 ENCOUNTER — APPOINTMENT (OUTPATIENT)
Dept: PHYSICAL THERAPY | Facility: MEDICAL CENTER | Age: 50
End: 2020-05-04
Payer: COMMERCIAL

## 2020-05-08 ENCOUNTER — APPOINTMENT (OUTPATIENT)
Dept: PHYSICAL THERAPY | Facility: MEDICAL CENTER | Age: 50
End: 2020-05-08
Payer: COMMERCIAL

## 2020-06-18 DIAGNOSIS — N52.9 ERECTILE DYSFUNCTION, UNSPECIFIED ERECTILE DYSFUNCTION TYPE: ICD-10-CM

## 2020-06-18 DIAGNOSIS — R79.89 LOW TESTOSTERONE IN MALE: ICD-10-CM

## 2020-06-18 RX ORDER — TESTOSTERONE 16.2 MG/G
GEL TRANSDERMAL
Qty: 75 G | Refills: 1 | Status: SHIPPED | OUTPATIENT
Start: 2020-06-18 | End: 2020-08-31

## 2020-08-24 ENCOUNTER — TELEPHONE (OUTPATIENT)
Dept: FAMILY MEDICINE CLINIC | Facility: CLINIC | Age: 50
End: 2020-08-24

## 2020-08-24 NOTE — TELEPHONE ENCOUNTER
Left message for patient has pending labs order from 01/23/20 good for 1 year which has general labs and testosterone level

## 2020-08-24 NOTE — TELEPHONE ENCOUNTER
Patient has visit 10/27/20 for PE and routine  Would like labs ordered and    Testosterone level if he is due

## 2020-08-26 ENCOUNTER — APPOINTMENT (OUTPATIENT)
Dept: LAB | Facility: MEDICAL CENTER | Age: 50
End: 2020-08-26
Payer: COMMERCIAL

## 2020-08-26 DIAGNOSIS — R79.89 LOW TESTOSTERONE IN MALE: ICD-10-CM

## 2020-08-26 DIAGNOSIS — E78.2 MIXED HYPERLIPIDEMIA: ICD-10-CM

## 2020-08-26 DIAGNOSIS — R73.01 IFG (IMPAIRED FASTING GLUCOSE): ICD-10-CM

## 2020-08-26 LAB
ALBUMIN SERPL BCP-MCNC: 4.1 G/DL (ref 3.5–5)
ALP SERPL-CCNC: 44 U/L (ref 46–116)
ALT SERPL W P-5'-P-CCNC: 41 U/L (ref 12–78)
ANION GAP SERPL CALCULATED.3IONS-SCNC: 5 MMOL/L (ref 4–13)
AST SERPL W P-5'-P-CCNC: 15 U/L (ref 5–45)
BASOPHILS # BLD AUTO: 0.03 THOUSANDS/ΜL (ref 0–0.1)
BASOPHILS NFR BLD AUTO: 1 % (ref 0–1)
BILIRUB SERPL-MCNC: 0.51 MG/DL (ref 0.2–1)
BUN SERPL-MCNC: 16 MG/DL (ref 5–25)
CALCIUM SERPL-MCNC: 8.9 MG/DL (ref 8.3–10.1)
CHLORIDE SERPL-SCNC: 108 MMOL/L (ref 100–108)
CHOLEST SERPL-MCNC: 240 MG/DL (ref 50–200)
CO2 SERPL-SCNC: 24 MMOL/L (ref 21–32)
CREAT SERPL-MCNC: 0.82 MG/DL (ref 0.6–1.3)
EOSINOPHIL # BLD AUTO: 0.07 THOUSAND/ΜL (ref 0–0.61)
EOSINOPHIL NFR BLD AUTO: 2 % (ref 0–6)
ERYTHROCYTE [DISTWIDTH] IN BLOOD BY AUTOMATED COUNT: 12.2 % (ref 11.6–15.1)
EST. AVERAGE GLUCOSE BLD GHB EST-MCNC: 105 MG/DL
GFR SERPL CREATININE-BSD FRML MDRD: 105 ML/MIN/1.73SQ M
GLUCOSE P FAST SERPL-MCNC: 112 MG/DL (ref 65–99)
HBA1C MFR BLD: 5.3 %
HCT VFR BLD AUTO: 43.4 % (ref 36.5–49.3)
HDLC SERPL-MCNC: 39 MG/DL
HGB BLD-MCNC: 14.4 G/DL (ref 12–17)
IMM GRANULOCYTES # BLD AUTO: 0.01 THOUSAND/UL (ref 0–0.2)
IMM GRANULOCYTES NFR BLD AUTO: 0 % (ref 0–2)
LDLC SERPL CALC-MCNC: 144 MG/DL (ref 0–100)
LYMPHOCYTES # BLD AUTO: 1.56 THOUSANDS/ΜL (ref 0.6–4.47)
LYMPHOCYTES NFR BLD AUTO: 35 % (ref 14–44)
MCH RBC QN AUTO: 31.3 PG (ref 26.8–34.3)
MCHC RBC AUTO-ENTMCNC: 33.2 G/DL (ref 31.4–37.4)
MCV RBC AUTO: 94 FL (ref 82–98)
MONOCYTES # BLD AUTO: 0.6 THOUSAND/ΜL (ref 0.17–1.22)
MONOCYTES NFR BLD AUTO: 14 % (ref 4–12)
NEUTROPHILS # BLD AUTO: 2.14 THOUSANDS/ΜL (ref 1.85–7.62)
NEUTS SEG NFR BLD AUTO: 48 % (ref 43–75)
NRBC BLD AUTO-RTO: 0 /100 WBCS
PLATELET # BLD AUTO: 292 THOUSANDS/UL (ref 149–390)
PMV BLD AUTO: 9.8 FL (ref 8.9–12.7)
POTASSIUM SERPL-SCNC: 4.5 MMOL/L (ref 3.5–5.3)
PROT SERPL-MCNC: 7.1 G/DL (ref 6.4–8.2)
PSA SERPL-MCNC: 1 NG/ML (ref 0–4)
RBC # BLD AUTO: 4.6 MILLION/UL (ref 3.88–5.62)
SODIUM SERPL-SCNC: 137 MMOL/L (ref 136–145)
TRIGL SERPL-MCNC: 284 MG/DL
WBC # BLD AUTO: 4.41 THOUSAND/UL (ref 4.31–10.16)

## 2020-08-26 PROCEDURE — 84402 ASSAY OF FREE TESTOSTERONE: CPT

## 2020-08-26 PROCEDURE — 80061 LIPID PANEL: CPT

## 2020-08-26 PROCEDURE — G0103 PSA SCREENING: HCPCS

## 2020-08-26 PROCEDURE — 83036 HEMOGLOBIN GLYCOSYLATED A1C: CPT

## 2020-08-26 PROCEDURE — 84403 ASSAY OF TOTAL TESTOSTERONE: CPT

## 2020-08-26 PROCEDURE — 80053 COMPREHEN METABOLIC PANEL: CPT

## 2020-08-26 PROCEDURE — 85025 COMPLETE CBC W/AUTO DIFF WBC: CPT

## 2020-08-26 PROCEDURE — 36415 COLL VENOUS BLD VENIPUNCTURE: CPT

## 2020-08-27 LAB
TESTOST FREE SERPL-MCNC: 5.9 PG/ML (ref 6.8–21.5)
TESTOST SERPL-MCNC: 142 NG/DL (ref 264–916)

## 2020-08-28 DIAGNOSIS — R79.89 LOW TESTOSTERONE IN MALE: ICD-10-CM

## 2020-08-28 DIAGNOSIS — N52.9 ERECTILE DYSFUNCTION, UNSPECIFIED ERECTILE DYSFUNCTION TYPE: ICD-10-CM

## 2020-08-31 RX ORDER — TESTOSTERONE 16.2 MG/G
60.75 GEL TRANSDERMAL EVERY MORNING
Qty: 75 G | Refills: 1 | Status: SHIPPED | OUTPATIENT
Start: 2020-08-31 | End: 2020-10-06 | Stop reason: SDUPTHER

## 2020-10-06 DIAGNOSIS — R79.89 LOW TESTOSTERONE IN MALE: ICD-10-CM

## 2020-10-06 DIAGNOSIS — N52.9 ERECTILE DYSFUNCTION, UNSPECIFIED ERECTILE DYSFUNCTION TYPE: ICD-10-CM

## 2020-10-06 RX ORDER — TESTOSTERONE 16.2 MG/G
60.75 GEL TRANSDERMAL EVERY MORNING
Qty: 75 G | Refills: 1 | Status: SHIPPED | OUTPATIENT
Start: 2020-10-06 | End: 2020-11-08 | Stop reason: SDUPTHER

## 2020-10-08 DIAGNOSIS — F41.0 PANIC ATTACK: ICD-10-CM

## 2020-10-08 DIAGNOSIS — F41.8 DEPRESSION WITH ANXIETY: ICD-10-CM

## 2020-10-08 RX ORDER — BUPROPION HYDROCHLORIDE 300 MG/1
300 TABLET ORAL DAILY
Qty: 90 TABLET | Refills: 3 | Status: SHIPPED | OUTPATIENT
Start: 2020-10-08 | End: 2021-09-28

## 2020-10-08 RX ORDER — PROPRANOLOL HYDROCHLORIDE 20 MG/1
20 TABLET ORAL DAILY PRN
Qty: 30 TABLET | Refills: 1 | Status: SHIPPED | OUTPATIENT
Start: 2020-10-08 | End: 2020-10-15 | Stop reason: SDUPTHER

## 2020-10-15 DIAGNOSIS — F41.0 PANIC ATTACK: ICD-10-CM

## 2020-10-15 RX ORDER — PROPRANOLOL HYDROCHLORIDE 20 MG/1
20 TABLET ORAL DAILY PRN
Qty: 90 TABLET | Refills: 0 | Status: SHIPPED | OUTPATIENT
Start: 2020-10-15 | End: 2021-11-24

## 2020-10-21 ENCOUNTER — OFFICE VISIT (OUTPATIENT)
Dept: FAMILY MEDICINE CLINIC | Facility: CLINIC | Age: 50
End: 2020-10-21
Payer: COMMERCIAL

## 2020-10-21 ENCOUNTER — LAB (OUTPATIENT)
Dept: LAB | Facility: MEDICAL CENTER | Age: 50
End: 2020-10-21
Payer: COMMERCIAL

## 2020-10-21 VITALS
HEIGHT: 71 IN | DIASTOLIC BLOOD PRESSURE: 70 MMHG | BODY MASS INDEX: 29.96 KG/M2 | WEIGHT: 214 LBS | TEMPERATURE: 97.8 F | HEART RATE: 72 BPM | SYSTOLIC BLOOD PRESSURE: 112 MMHG | RESPIRATION RATE: 16 BRPM

## 2020-10-21 DIAGNOSIS — M54.12 CERVICAL RADICULOPATHY: Primary | ICD-10-CM

## 2020-10-21 DIAGNOSIS — R79.89 LOW TESTOSTERONE IN MALE: ICD-10-CM

## 2020-10-21 DIAGNOSIS — Z23 NEED FOR INFLUENZA VACCINATION: ICD-10-CM

## 2020-10-21 PROCEDURE — 90471 IMMUNIZATION ADMIN: CPT

## 2020-10-21 PROCEDURE — 84403 ASSAY OF TOTAL TESTOSTERONE: CPT

## 2020-10-21 PROCEDURE — 84402 ASSAY OF FREE TESTOSTERONE: CPT

## 2020-10-21 PROCEDURE — 90686 IIV4 VACC NO PRSV 0.5 ML IM: CPT

## 2020-10-21 PROCEDURE — 99214 OFFICE O/P EST MOD 30 MIN: CPT | Performed by: FAMILY MEDICINE

## 2020-10-21 PROCEDURE — 36415 COLL VENOUS BLD VENIPUNCTURE: CPT

## 2020-10-21 PROCEDURE — 1036F TOBACCO NON-USER: CPT | Performed by: FAMILY MEDICINE

## 2020-10-21 RX ORDER — PREDNISONE 10 MG/1
TABLET ORAL
Qty: 28 TABLET | Refills: 0 | Status: SHIPPED | OUTPATIENT
Start: 2020-10-21 | End: 2020-11-23 | Stop reason: ALTCHOICE

## 2020-10-22 LAB
TESTOST FREE SERPL-MCNC: 10.8 PG/ML (ref 6.8–21.5)
TESTOST SERPL-MCNC: 154 NG/DL (ref 264–916)

## 2020-10-27 ENCOUNTER — OFFICE VISIT (OUTPATIENT)
Dept: FAMILY MEDICINE CLINIC | Facility: CLINIC | Age: 50
End: 2020-10-27
Payer: COMMERCIAL

## 2020-10-27 ENCOUNTER — APPOINTMENT (OUTPATIENT)
Dept: RADIOLOGY | Facility: MEDICAL CENTER | Age: 50
End: 2020-10-27
Payer: COMMERCIAL

## 2020-10-27 VITALS
SYSTOLIC BLOOD PRESSURE: 130 MMHG | DIASTOLIC BLOOD PRESSURE: 88 MMHG | WEIGHT: 218 LBS | HEART RATE: 100 BPM | OXYGEN SATURATION: 98 % | TEMPERATURE: 98.1 F | BODY MASS INDEX: 32.29 KG/M2 | HEIGHT: 69 IN | RESPIRATION RATE: 16 BRPM

## 2020-10-27 DIAGNOSIS — Z00.00 ENCOUNTER FOR PREVENTIVE CARE: ICD-10-CM

## 2020-10-27 DIAGNOSIS — E78.2 MIXED HYPERLIPIDEMIA: ICD-10-CM

## 2020-10-27 DIAGNOSIS — F41.0 PANIC ATTACK: ICD-10-CM

## 2020-10-27 DIAGNOSIS — R29.898 RIGHT ARM WEAKNESS: ICD-10-CM

## 2020-10-27 DIAGNOSIS — R29.898 RIGHT ARM WEAKNESS: Primary | ICD-10-CM

## 2020-10-27 DIAGNOSIS — R79.89 LOW TESTOSTERONE IN MALE: ICD-10-CM

## 2020-10-27 PROBLEM — E66.9 OBESITY (BMI 30-39.9): Status: RESOLVED | Noted: 2018-12-10 | Resolved: 2020-10-27

## 2020-10-27 PROBLEM — N52.9 ERECTILE DYSFUNCTION: Status: RESOLVED | Noted: 2018-12-10 | Resolved: 2020-10-27

## 2020-10-27 PROCEDURE — 3725F SCREEN DEPRESSION PERFORMED: CPT | Performed by: FAMILY MEDICINE

## 2020-10-27 PROCEDURE — 73030 X-RAY EXAM OF SHOULDER: CPT

## 2020-10-27 PROCEDURE — 99214 OFFICE O/P EST MOD 30 MIN: CPT | Performed by: FAMILY MEDICINE

## 2020-10-27 PROCEDURE — 72050 X-RAY EXAM NECK SPINE 4/5VWS: CPT

## 2020-10-27 PROCEDURE — 99396 PREV VISIT EST AGE 40-64: CPT | Performed by: FAMILY MEDICINE

## 2020-10-27 RX ORDER — ATORVASTATIN CALCIUM 10 MG/1
10 TABLET, FILM COATED ORAL
Qty: 30 TABLET | Refills: 5 | Status: SHIPPED | OUTPATIENT
Start: 2020-10-27 | End: 2021-04-19

## 2020-11-02 ENCOUNTER — NURSE TRIAGE (OUTPATIENT)
Dept: OTHER | Facility: OTHER | Age: 50
End: 2020-11-02

## 2020-11-02 ENCOUNTER — TELEPHONE (OUTPATIENT)
Dept: OBGYN CLINIC | Facility: HOSPITAL | Age: 50
End: 2020-11-02

## 2020-11-02 DIAGNOSIS — Z20.828 EXPOSURE TO SARS-ASSOCIATED CORONAVIRUS: Primary | ICD-10-CM

## 2020-11-03 ENCOUNTER — TELEMEDICINE (OUTPATIENT)
Dept: FAMILY MEDICINE CLINIC | Facility: CLINIC | Age: 50
End: 2020-11-03
Payer: COMMERCIAL

## 2020-11-03 DIAGNOSIS — Z03.818 ENCOUNTER FOR OBSERVATION FOR SUSPECTED EXPOSURE TO OTHER BIOLOGICAL AGENTS RULED OUT: Primary | ICD-10-CM

## 2020-11-03 DIAGNOSIS — Z20.828 EXPOSURE TO SARS-ASSOCIATED CORONAVIRUS: ICD-10-CM

## 2020-11-03 PROCEDURE — U0003 INFECTIOUS AGENT DETECTION BY NUCLEIC ACID (DNA OR RNA); SEVERE ACUTE RESPIRATORY SYNDROME CORONAVIRUS 2 (SARS-COV-2) (CORONAVIRUS DISEASE [COVID-19]), AMPLIFIED PROBE TECHNIQUE, MAKING USE OF HIGH THROUGHPUT TECHNOLOGIES AS DESCRIBED BY CMS-2020-01-R: HCPCS | Performed by: FAMILY MEDICINE

## 2020-11-03 PROCEDURE — 99214 OFFICE O/P EST MOD 30 MIN: CPT | Performed by: FAMILY MEDICINE

## 2020-11-04 LAB — SARS-COV-2 RNA SPEC QL NAA+PROBE: NOT DETECTED

## 2020-11-08 DIAGNOSIS — N52.9 ERECTILE DYSFUNCTION, UNSPECIFIED ERECTILE DYSFUNCTION TYPE: ICD-10-CM

## 2020-11-08 DIAGNOSIS — R79.89 LOW TESTOSTERONE IN MALE: ICD-10-CM

## 2020-11-09 RX ORDER — TESTOSTERONE 16.2 MG/G
60.75 GEL TRANSDERMAL EVERY MORNING
Qty: 75 G | Refills: 1 | Status: SHIPPED | OUTPATIENT
Start: 2020-11-09 | End: 2020-11-18 | Stop reason: SDUPTHER

## 2020-11-10 ENCOUNTER — OFFICE VISIT (OUTPATIENT)
Dept: OBGYN CLINIC | Facility: MEDICAL CENTER | Age: 50
End: 2020-11-10
Payer: COMMERCIAL

## 2020-11-10 VITALS
WEIGHT: 218 LBS | BODY MASS INDEX: 30.52 KG/M2 | HEART RATE: 73 BPM | HEIGHT: 71 IN | SYSTOLIC BLOOD PRESSURE: 121 MMHG | TEMPERATURE: 98 F | DIASTOLIC BLOOD PRESSURE: 76 MMHG

## 2020-11-10 DIAGNOSIS — S16.1XXA STRAIN OF NECK MUSCLE, INITIAL ENCOUNTER: Primary | ICD-10-CM

## 2020-11-10 PROCEDURE — 99213 OFFICE O/P EST LOW 20 MIN: CPT | Performed by: ORTHOPAEDIC SURGERY

## 2020-11-10 RX ORDER — DICLOFENAC SODIUM 75 MG/1
75 TABLET, DELAYED RELEASE ORAL 2 TIMES DAILY
Qty: 60 TABLET | Refills: 2 | Status: SHIPPED | OUTPATIENT
Start: 2020-11-10 | End: 2020-12-23

## 2020-11-12 ENCOUNTER — EVALUATION (OUTPATIENT)
Dept: PHYSICAL THERAPY | Facility: MEDICAL CENTER | Age: 50
End: 2020-11-12
Payer: COMMERCIAL

## 2020-11-12 DIAGNOSIS — S16.1XXA STRAIN OF NECK MUSCLE, INITIAL ENCOUNTER: Primary | ICD-10-CM

## 2020-11-12 PROCEDURE — 97112 NEUROMUSCULAR REEDUCATION: CPT | Performed by: PHYSICAL MEDICINE & REHABILITATION

## 2020-11-12 PROCEDURE — 97161 PT EVAL LOW COMPLEX 20 MIN: CPT | Performed by: PHYSICAL MEDICINE & REHABILITATION

## 2020-11-17 ENCOUNTER — OFFICE VISIT (OUTPATIENT)
Dept: PHYSICAL THERAPY | Facility: MEDICAL CENTER | Age: 50
End: 2020-11-17
Payer: COMMERCIAL

## 2020-11-17 DIAGNOSIS — S16.1XXA STRAIN OF NECK MUSCLE, INITIAL ENCOUNTER: Primary | ICD-10-CM

## 2020-11-17 PROCEDURE — 97012 MECHANICAL TRACTION THERAPY: CPT | Performed by: PHYSICAL MEDICINE & REHABILITATION

## 2020-11-17 PROCEDURE — 97112 NEUROMUSCULAR REEDUCATION: CPT | Performed by: PHYSICAL MEDICINE & REHABILITATION

## 2020-11-17 PROCEDURE — 97110 THERAPEUTIC EXERCISES: CPT | Performed by: PHYSICAL MEDICINE & REHABILITATION

## 2020-11-18 DIAGNOSIS — R79.89 LOW TESTOSTERONE IN MALE: ICD-10-CM

## 2020-11-18 DIAGNOSIS — N52.9 ERECTILE DYSFUNCTION, UNSPECIFIED ERECTILE DYSFUNCTION TYPE: ICD-10-CM

## 2020-11-19 RX ORDER — TESTOSTERONE 16.2 MG/G
60.75 GEL TRANSDERMAL EVERY MORNING
Qty: 75 G | Refills: 1 | Status: SHIPPED | OUTPATIENT
Start: 2020-11-19 | End: 2020-12-09 | Stop reason: SDUPTHER

## 2020-11-20 ENCOUNTER — OFFICE VISIT (OUTPATIENT)
Dept: PHYSICAL THERAPY | Facility: MEDICAL CENTER | Age: 50
End: 2020-11-20
Payer: COMMERCIAL

## 2020-11-20 DIAGNOSIS — S16.1XXA STRAIN OF NECK MUSCLE, INITIAL ENCOUNTER: Primary | ICD-10-CM

## 2020-11-20 PROCEDURE — 97110 THERAPEUTIC EXERCISES: CPT | Performed by: PHYSICAL MEDICINE & REHABILITATION

## 2020-11-21 PROCEDURE — 97012 MECHANICAL TRACTION THERAPY: CPT | Performed by: PHYSICAL MEDICINE & REHABILITATION

## 2020-11-23 ENCOUNTER — OFFICE VISIT (OUTPATIENT)
Dept: PHYSICAL THERAPY | Facility: MEDICAL CENTER | Age: 50
End: 2020-11-23
Payer: COMMERCIAL

## 2020-11-23 ENCOUNTER — CONSULT (OUTPATIENT)
Dept: PAIN MEDICINE | Facility: MEDICAL CENTER | Age: 50
End: 2020-11-23
Payer: COMMERCIAL

## 2020-11-23 VITALS
HEART RATE: 98 BPM | WEIGHT: 217 LBS | SYSTOLIC BLOOD PRESSURE: 109 MMHG | TEMPERATURE: 98.3 F | HEIGHT: 71 IN | BODY MASS INDEX: 30.38 KG/M2 | RESPIRATION RATE: 16 BRPM | DIASTOLIC BLOOD PRESSURE: 76 MMHG

## 2020-11-23 DIAGNOSIS — S16.1XXA STRAIN OF NECK MUSCLE, INITIAL ENCOUNTER: ICD-10-CM

## 2020-11-23 DIAGNOSIS — M54.12 CERVICAL RADICULOPATHY: Primary | ICD-10-CM

## 2020-11-23 DIAGNOSIS — S16.1XXA STRAIN OF NECK MUSCLE, INITIAL ENCOUNTER: Primary | ICD-10-CM

## 2020-11-23 PROCEDURE — 3008F BODY MASS INDEX DOCD: CPT | Performed by: PHYSICAL MEDICINE & REHABILITATION

## 2020-11-23 PROCEDURE — 1036F TOBACCO NON-USER: CPT | Performed by: PHYSICAL MEDICINE & REHABILITATION

## 2020-11-23 PROCEDURE — 97012 MECHANICAL TRACTION THERAPY: CPT | Performed by: PHYSICAL MEDICINE & REHABILITATION

## 2020-11-23 PROCEDURE — 99204 OFFICE O/P NEW MOD 45 MIN: CPT | Performed by: PHYSICAL MEDICINE & REHABILITATION

## 2020-11-23 RX ORDER — GABAPENTIN 300 MG/1
300 CAPSULE ORAL 3 TIMES DAILY
Qty: 90 CAPSULE | Refills: 1 | Status: SHIPPED | OUTPATIENT
Start: 2020-11-23 | End: 2020-12-23

## 2020-11-24 ENCOUNTER — TELEPHONE (OUTPATIENT)
Dept: RHEUMATOLOGY | Facility: CLINIC | Age: 50
End: 2020-11-24

## 2020-12-01 ENCOUNTER — OFFICE VISIT (OUTPATIENT)
Dept: PHYSICAL THERAPY | Facility: MEDICAL CENTER | Age: 50
End: 2020-12-01
Payer: COMMERCIAL

## 2020-12-01 DIAGNOSIS — S16.1XXA STRAIN OF NECK MUSCLE, INITIAL ENCOUNTER: Primary | ICD-10-CM

## 2020-12-01 PROCEDURE — 97012 MECHANICAL TRACTION THERAPY: CPT | Performed by: PHYSICAL MEDICINE & REHABILITATION

## 2020-12-01 PROCEDURE — 97110 THERAPEUTIC EXERCISES: CPT | Performed by: PHYSICAL MEDICINE & REHABILITATION

## 2020-12-04 ENCOUNTER — OFFICE VISIT (OUTPATIENT)
Dept: PHYSICAL THERAPY | Facility: MEDICAL CENTER | Age: 50
End: 2020-12-04
Payer: COMMERCIAL

## 2020-12-04 DIAGNOSIS — S16.1XXA STRAIN OF NECK MUSCLE, INITIAL ENCOUNTER: Primary | ICD-10-CM

## 2020-12-04 PROCEDURE — 97110 THERAPEUTIC EXERCISES: CPT | Performed by: PHYSICAL MEDICINE & REHABILITATION

## 2020-12-05 PROCEDURE — 97012 MECHANICAL TRACTION THERAPY: CPT | Performed by: PHYSICAL MEDICINE & REHABILITATION

## 2020-12-07 ENCOUNTER — OFFICE VISIT (OUTPATIENT)
Dept: PHYSICAL THERAPY | Facility: MEDICAL CENTER | Age: 50
End: 2020-12-07
Payer: COMMERCIAL

## 2020-12-07 DIAGNOSIS — S16.1XXA STRAIN OF NECK MUSCLE, INITIAL ENCOUNTER: Primary | ICD-10-CM

## 2020-12-07 PROCEDURE — 97012 MECHANICAL TRACTION THERAPY: CPT | Performed by: PHYSICAL MEDICINE & REHABILITATION

## 2020-12-07 PROCEDURE — 97110 THERAPEUTIC EXERCISES: CPT | Performed by: PHYSICAL MEDICINE & REHABILITATION

## 2020-12-09 DIAGNOSIS — R79.89 LOW TESTOSTERONE IN MALE: ICD-10-CM

## 2020-12-09 DIAGNOSIS — N52.9 ERECTILE DYSFUNCTION, UNSPECIFIED ERECTILE DYSFUNCTION TYPE: ICD-10-CM

## 2020-12-10 RX ORDER — TESTOSTERONE 16.2 MG/G
60.75 GEL TRANSDERMAL EVERY MORNING
Qty: 75 G | Refills: 0 | Status: SHIPPED | OUTPATIENT
Start: 2020-12-10 | End: 2021-04-28 | Stop reason: ALTCHOICE

## 2020-12-11 ENCOUNTER — APPOINTMENT (OUTPATIENT)
Dept: PHYSICAL THERAPY | Facility: MEDICAL CENTER | Age: 50
End: 2020-12-11
Payer: COMMERCIAL

## 2020-12-23 ENCOUNTER — CONSULT (OUTPATIENT)
Dept: ENDOCRINOLOGY | Facility: CLINIC | Age: 50
End: 2020-12-23
Payer: COMMERCIAL

## 2020-12-23 VITALS
HEIGHT: 71 IN | BODY MASS INDEX: 31.22 KG/M2 | WEIGHT: 223 LBS | DIASTOLIC BLOOD PRESSURE: 78 MMHG | HEART RATE: 99 BPM | SYSTOLIC BLOOD PRESSURE: 112 MMHG

## 2020-12-23 DIAGNOSIS — R79.89 LOW TESTOSTERONE IN MALE: ICD-10-CM

## 2020-12-23 PROCEDURE — 3008F BODY MASS INDEX DOCD: CPT | Performed by: PHYSICAL MEDICINE & REHABILITATION

## 2020-12-23 PROCEDURE — 99204 OFFICE O/P NEW MOD 45 MIN: CPT | Performed by: INTERNAL MEDICINE

## 2020-12-23 NOTE — PROGRESS NOTES
Bakari Rodriguez 52 y o  male MRN: [id number]    Encounter: [id number]      Assessment/Plan     Problem List Items Addressed This Visit        Other    Low testosterone in male     Both total and free testosterone were normal in sept 2019 prior to starting testosterone replacement - since then he has had low total but most of the time still normal free testosterone - symptoms have not improved , I dont think symptoms are related to testosterone deficiency - for now I have advised to stop supplementations and repeat total and free testosterone along with other labs in 3 months          Relevant Orders    T4, free Lab Collect    TSH, 3rd generation Lab Collect    Testosterone, free, total Lab Collect    Prolactin Lab Collect    Sex Hormone Binding Globulin- Lab Collect    Luteinizing hormone Lab Collect    Follicle stimulating hormone Lab Collect        CC:   Low testosterone     History of Present Illness     HPI:  51 y/o male referred here for evaluation of hypogonadism - he has been on testosterone replacement and currently  On androgel  3 pump  Daily  For the past 6 months     Had initially presented with low libido , fatigue , no weight gain or loss of muscle mass   Symptoms are not much improved since starting testosterone   No ED , gets morning erection    No history of testicular trauma , no history of infertility   No head trauma   No gynecomastia   Not on chronic steroids /narcotics     No morning headaches     Review of Systems   Constitutional: Positive for fatigue  Negative for unexpected weight change  Respiratory: Negative for cough and shortness of breath  Cardiovascular: Negative for palpitations and leg swelling  Gastrointestinal: Negative for constipation, diarrhea, nausea and vomiting  Musculoskeletal: Positive for arthralgias  Negative for gait problem  Skin: Negative for wound  Psychiatric/Behavioral: Positive for sleep disturbance  The patient is nervous/anxious      All other systems reviewed and are negative        Historical Information   Past Medical History:   Diagnosis Date    Anxiety     "general work related"    Chronic pain disorder     Dislocated shoulder     originally approx 25 yrs ago//left    Exercises 3 to 4 times per week     4-5x/week    Hyperlipidemia     borderline    Impaired fasting glucose     Joint pain     Labral tear of shoulder     left with occas pain    Olecranon bursitis, left elbow     had surgery    Osteoarthritis     Shoulder arthritis     left    Vitamin D deficiency     pt not sure    Wears glasses     reading     Past Surgical History:   Procedure Laterality Date    ARTHROSCOPIC BICEPS TENODESIS Left 2018    Procedure: SHOULDER ARTHROSCOPY WITH BICEPS TENODESIS;  Surgeon: Marium Amaya MD;  Location: [address] [place] [address];  Service: Orthopedics    FL INJECTION LEFT SHOULDER (ARTHROGRAM)  2020    ORTHOPEDIC SURGERY      [place] REMOVAL OF ELBOW BURSA Left 10/3/2019    Procedure: EXCISION BURSA OLECRANON ELBOW;  Surgeon: Bryce Hardy MD;  Location: [address] [place] [address];  Service: Orthopedics    LA SHLDR ARTHROSCOP,SURG,REPAIR,SLAP LESION Left 2/10/2020    Procedure: ARTHROSCOPY SHOULDER, posterior labral repair;  Surgeon: Nargis Metcalf MD;  Location: [facility] OR;  Service: Orthopedics    ROTATOR CUFF REPAIR      ROTATOR CUFF REPAIR Left     x2 last one     SEPTOPLASTY      WISDOM TOOTH EXTRACTION       Social History   Social History     Substance and Sexual Activity   Alcohol Use Yes    Frequency: Monthly or less    Drinks per session: 1 or 2    Binge frequency: Never    Comment: socially     Social History     Substance and Sexual Activity   Drug Use Never     Social History     Tobacco Use   Smoking Status Former Smoker    Years: 15 00    Types: Cigarettes    Quit date: 2007    Years since quittin 9   Smokeless Tobacco Never Used     Family History:   Family History   Problem Relation Age of Onset    No Known Problems Mother    Raza Diallo Heart attack Father 79       Meds/Allergies   Current Outpatient Medications   Medication Sig Dispense Refill    acyclovir (ZOVIRAX) 5 % ointment Apply topically every 4 (four) hours (Patient taking differently: Apply topically as needed ) 15 g 0    atorvastatin (LIPITOR) 10 mg tablet Take 1 tablet (10 mg total) by mouth daily at bedtime 30 tablet 5    buPROPion (WELLBUTRIN XL) 300 mg 24 hr tablet Take 1 tablet (300 mg total) by mouth daily 90 tablet 3    Cholecalciferol (VITAMIN D) 2000 units CAPS Take by mouth      glucosamine-chondroitin 500-400 MG tablet Take 1 tablet by mouth every morning      Multiple Vitamin (MULTIVITAMIN) tablet Take 1 tablet by mouth daily      Omega-3 Fatty Acids (FISH OIL) 1,000 mg Take 1,000 mg by mouth daily      propranolol (INDERAL) 20 mg tablet Take 1 tablet (20 mg total) by mouth daily as needed (anxiety, panic attack) 90 tablet 0    testosterone (ANDROGEL) 1 62 % TD gel pump Apply 3 actuation (60 75 mg total) topically every morning use 3 PUMPS ON THE SKIN once daily 75 g 0    valACYclovir (VALTREX) 1,000 mg tablet Take 2 tablets (2,000 mg total) by mouth 2 (two) times a day as needed (cold sore) for up to 1 day 30 tablet 1     No current facility-administered medications for this visit  No Known Allergies    Objective   Vitals: Blood pressure 112/78, pulse 99, height 5' 11" (1 803 m), weight 101 kg (223 lb)  Physical Exam  Vitals signs reviewed  Constitutional:       Appearance: Normal appearance  He is not ill-appearing or diaphoretic  Comments: Well built muscular    HENT:      Head: Normocephalic and atraumatic  Eyes:      General: No scleral icterus  Extraocular Movements: Extraocular movements intact  Neck:      Musculoskeletal: Neck supple  Cardiovascular:      Rate and Rhythm: Normal rate and regular rhythm  Heart sounds: Normal heart sounds  No murmur  Pulmonary:      Effort: Pulmonary effort is normal  No respiratory distress  Breath sounds: Normal breath sounds  No wheezing or rales  Abdominal:      General: There is no distension  Palpations: Abdomen is soft  Tenderness: There is no abdominal tenderness  There is no guarding  Musculoskeletal:      Right lower leg: No edema  Left lower leg: No edema  Lymphadenopathy:      Cervical: No cervical adenopathy  Skin:     General: Skin is warm and dry  Neurological:      General: No focal deficit present  Mental Status: He is alert and oriented to person, place, and time  Psychiatric:         Mood and Affect: Mood normal          Behavior: Behavior normal          Thought Content: Thought content normal          Judgment: Judgment normal          The history was obtained from the review of the chart, patient  Lab Results:   Lab Results   Component Value Date/Time    Potassium 4 5 08/26/2020 10:16 AM    Potassium 4 5 01/20/2020 07:39 AM    Chloride 108 08/26/2020 10:16 AM    Chloride 106 01/20/2020 07:39 AM    CO2 24 08/26/2020 10:16 AM    CO2 27 01/20/2020 07:39 AM    BUN 16 08/26/2020 10:16 AM    BUN 11 01/20/2020 07:39 AM    Creatinine 0 82 08/26/2020 10:16 AM    Creatinine 1 06 01/20/2020 07:39 AM    Glucose, Fasting 112 (H) 08/26/2020 10:16 AM    Glucose, Fasting 121 (H) 01/20/2020 07:39 AM    Calcium 8 9 08/26/2020 10:16 AM    Calcium 10 0 01/20/2020 07:39 AM    eGFR 105 08/26/2020 10:16 AM    eGFR 83 01/20/2020 07:39 AM    Testosterone, Free 10 8 10/21/2020 08:48 AM    Testosterone, Free 5 9 (L) 08/26/2020 10:16 AM    Testosterone, Free 11 7 01/20/2020 07:39 AM             Imaging Studies:          Portions of the record may have been created with voice recognition software  Occasional wrong word or "sound a like" substitutions may have occurred due to the inherent limitations of voice recognition software  Read the chart carefully and recognize, using context, where substitutions have occurred

## 2021-01-02 NOTE — ASSESSMENT & PLAN NOTE
Both total and free testosterone were normal in sept 2019 prior to starting testosterone replacement - since then he has had low total but most of the time still normal free testosterone - symptoms have not improved , I dont think symptoms are related to testosterone deficiency - for now I have advised to stop supplementations and repeat total and free testosterone along with other labs in 3 months

## 2021-01-12 ENCOUNTER — OFFICE VISIT (OUTPATIENT)
Dept: OBGYN CLINIC | Facility: MEDICAL CENTER | Age: 51
End: 2021-01-12
Payer: COMMERCIAL

## 2021-01-12 VITALS
DIASTOLIC BLOOD PRESSURE: 73 MMHG | BODY MASS INDEX: 31.22 KG/M2 | SYSTOLIC BLOOD PRESSURE: 125 MMHG | HEIGHT: 71 IN | HEART RATE: 71 BPM | WEIGHT: 223 LBS

## 2021-01-12 DIAGNOSIS — S69.81XA TFCC (TRIANGULAR FIBROCARTILAGE COMPLEX) INJURY, RIGHT, INITIAL ENCOUNTER: Primary | ICD-10-CM

## 2021-01-12 DIAGNOSIS — M25.531 PAIN IN RIGHT WRIST: ICD-10-CM

## 2021-01-12 PROCEDURE — 1036F TOBACCO NON-USER: CPT | Performed by: ORTHOPAEDIC SURGERY

## 2021-01-12 PROCEDURE — 3008F BODY MASS INDEX DOCD: CPT | Performed by: ORTHOPAEDIC SURGERY

## 2021-01-12 PROCEDURE — 99214 OFFICE O/P EST MOD 30 MIN: CPT | Performed by: ORTHOPAEDIC SURGERY

## 2021-01-12 RX ORDER — NAPROXEN 500 MG/1
500 TABLET ORAL 2 TIMES DAILY WITH MEALS
Qty: 60 TABLET | Refills: 0 | Status: SHIPPED | OUTPATIENT
Start: 2021-01-12 | End: 2021-11-24

## 2021-01-12 NOTE — PROGRESS NOTES
Chief Complaint     Right wrist pain      History of Present Illness     Patricia Rosenthal is a 52 y o  male who presents today for initial evaluation of chronic right wrist pain  He is left handed  He states that he 1st began to experience pain about his right wrist while participating in physical therapy for his left shoulder postoperatively  He states that his pain has been worsening over the last few months  At today's visit, he describes a sharp pain about the ulnar aspect of his wrist   His pain increases with axial loading such as when performing pushups  He also finds his pain increases with gripping objects  He did briefly attempt to use a brace but states that he discontinue this after one day  He does also take Aleve for pain relief with some benefit  He denies any acute injury or trauma         Past Medical History:   Diagnosis Date    Anxiety     "general work related"    Chronic pain disorder     Dislocated shoulder     originally approx 25 yrs ago//left    Exercises 3 to 4 times per week     4-5x/week    Hyperlipidemia     borderline    Impaired fasting glucose     Joint pain     Labral tear of shoulder     left with occas pain    Olecranon bursitis, left elbow     had surgery    Osteoarthritis     Shoulder arthritis     left    Vitamin D deficiency     pt not sure    Wears glasses     reading       Past Surgical History:   Procedure Laterality Date    ARTHROSCOPIC BICEPS TENODESIS Left 2/8/2018    Procedure: SHOULDER ARTHROSCOPY WITH BICEPS TENODESIS;  Surgeon: Kalin Kohler MD;  Location: [address] [place] [address];  Service: Orthopedics    FL INJECTION LEFT SHOULDER (ARTHROGRAM)  1/8/2020    ORTHOPEDIC SURGERY      [place] REMOVAL OF ELBOW BURSA Left 10/3/2019    Procedure: EXCISION BURSA OLECRANON ELBOW;  Surgeon: Lindsey Sesay MD;  Location: [address] [place] [address];  Service: Orthopedics    [place] SHLDR ARTHROSCOP,SURG,REPAIR,SLAP LESION Left 2/10/2020    Procedure: ARTHROSCOPY SHOULDER, posterior labral repair;  Surgeon: Kacy Palomo MD;  Location: [facility] OR;  Service: Orthopedics    ROTATOR CUFF REPAIR      ROTATOR CUFF REPAIR Left     x2 last one     SEPTOPLASTY      WISDOM TOOTH EXTRACTION         No Known Allergies    Current Outpatient Medications on File Prior to Visit   Medication Sig Dispense Refill    acyclovir (ZOVIRAX) 5 % ointment Apply topically every 4 (four) hours (Patient taking differently: Apply topically as needed ) 15 g 0    atorvastatin (LIPITOR) 10 mg tablet Take 1 tablet (10 mg total) by mouth daily at bedtime 30 tablet 5    Cholecalciferol (VITAMIN D) 2000 units CAPS Take by mouth      glucosamine-chondroitin 500-400 MG tablet Take 1 tablet by mouth every morning      Multiple Vitamin (MULTIVITAMIN) tablet Take 1 tablet by mouth daily      Omega-3 Fatty Acids (FISH OIL) 1,000 mg Take 1,000 mg by mouth daily      propranolol (INDERAL) 20 mg tablet Take 1 tablet (20 mg total) by mouth daily as needed (anxiety, panic attack) 90 tablet 0    testosterone (ANDROGEL) 1 62 % TD gel pump Apply 3 actuation (60 75 mg total) topically every morning use 3 PUMPS ON THE SKIN once daily 75 g 0    buPROPion (WELLBUTRIN XL) 300 mg 24 hr tablet Take 1 tablet (300 mg total) by mouth daily 90 tablet 3    valACYclovir (VALTREX) 1,000 mg tablet Take 2 tablets (2,000 mg total) by mouth 2 (two) times a day as needed (cold sore) for up to 1 day 30 tablet 1     No current facility-administered medications on file prior to visit          Social History     Tobacco Use    Smoking status: Former Smoker     Years: 15 00     Types: Cigarettes     Quit date: 2007     Years since quittin 9    Smokeless tobacco: Never Used   Substance Use Topics    Alcohol use: Yes     Frequency: Monthly or less     Drinks per session: 1 or 2     Binge frequency: Never     Comment: socially    Drug use: Never       Family History   Problem Relation Age of Onset    No Known Problems Mother     Heart attack Father 79       Review of Systems     As stated in the HPI  All other systems were reviewed and are negative  Physical Exam     /73   Pulse 71   Ht 5' 11" (1 803 m)   Wt 101 kg (223 lb)   BMI 31 10 kg/m²     GENERAL: This is a well-developed, well-nourished, age-appropriate patient in no acute distress  The patient is alert and oriented x3  Pleasant and cooperative  Eyes: Anicteric sclerae  Extraocular movements appear intact  HENT: Nares are patent with no drainage  Lungs: There is equal chest rise on inspection  Breathing is non-labored with no audible wheezing  Cardiovascular: No cyanosis  No upper extremity lymphadema  Skin: Skin is warm to touch  No obvious skin lesions or rashes other than described below  Neurologic: No ataxia  Psychiatric: Mood and affect are appropriate  Right wrist:  No deformity  Skin intact without significant swelling or ecchymosis  Full elbow ROM  Full pronation and supination  Full flexion and extension of the wrist  Pain recreated with power   No tenderness 1-6 dorsal compartments  Positive impaction signs  Tender to palpation at ulnar fovea  Negative pisotriquetral grind  Negative ECU synergy test  DRUJ stable in pronation, supination, and neutral    5/5 Motor to the APB, FDI, FDP2, FDP5, EDC  Sensation intact to light touch in the median, radial, and ulnar nerve distribution  Data Review     Results Reviewed     None             Imaging:  No imaging to review    Assessment and Plan      Diagnoses and all orders for this visit:    TFCC (triangular fibrocartilage complex) injury, right, initial encounter    Pain in right wrist           53-year-old male with ulnar-sided wrist pain likely suggestive of TFCC injury  I explained to Brent Carbone that he is suffering with a TFCC injury  We discussed treatment options today and I recommended initiating nonoperative treatment  He will begin using his brace daily during periods of activity    He understands he may remove the brace for sleeping showering and other activities of daily living  I also recommended a daily NSAID medication and provided him with a prescription for Naprosyn 500 mg b i d  For 30 days  I also explained that if he fails to find lasting relief with these interventions I would offer him a corticosteroid injection for symptomatic relief  We also briefly discussed obtaining an MRI and potential subsequent arthroscopic debridement for persistent symptoms despite the above stated interventions  All of his questions and concerns were addressed today  We will see him back in four weeks for repeat clinical evaluation        Follow Up: 4 weeks    To Do Next Visit: Right wrist x-rays with ulnar variance views    PROCEDURES PERFORMED:  Procedures  No Procedures performed today

## 2021-02-08 ENCOUNTER — TELEPHONE (OUTPATIENT)
Dept: OBGYN CLINIC | Facility: OTHER | Age: 51
End: 2021-02-08

## 2021-02-08 NOTE — TELEPHONE ENCOUNTER
Patient called in in regards to a Co-Pay bill of $40 00 on  11/10/2020 with Dr Elza Jacobson  He stated that he spoke to Billing and boiling told him he would have to speak to that office directly   He said he does have proof that he paid the co-pay  Can we look into this please?     *Patient has an appointment with Dr Arun Barr tomorrow in case we want to speak with him there    C/b # [phone]

## 2021-02-09 ENCOUNTER — APPOINTMENT (OUTPATIENT)
Dept: RADIOLOGY | Facility: MEDICAL CENTER | Age: 51
End: 2021-02-09
Payer: COMMERCIAL

## 2021-02-09 ENCOUNTER — OFFICE VISIT (OUTPATIENT)
Dept: OBGYN CLINIC | Facility: MEDICAL CENTER | Age: 51
End: 2021-02-09
Payer: COMMERCIAL

## 2021-02-09 VITALS
SYSTOLIC BLOOD PRESSURE: 115 MMHG | BODY MASS INDEX: 31.22 KG/M2 | HEART RATE: 101 BPM | WEIGHT: 223 LBS | HEIGHT: 71 IN | DIASTOLIC BLOOD PRESSURE: 74 MMHG

## 2021-02-09 DIAGNOSIS — S69.81XA TFCC (TRIANGULAR FIBROCARTILAGE COMPLEX) INJURY, RIGHT, INITIAL ENCOUNTER: Primary | ICD-10-CM

## 2021-02-09 DIAGNOSIS — S69.81XA TFCC (TRIANGULAR FIBROCARTILAGE COMPLEX) INJURY, RIGHT, INITIAL ENCOUNTER: ICD-10-CM

## 2021-02-09 PROCEDURE — 99213 OFFICE O/P EST LOW 20 MIN: CPT | Performed by: ORTHOPAEDIC SURGERY

## 2021-02-09 PROCEDURE — 20605 DRAIN/INJ JOINT/BURSA W/O US: CPT | Performed by: ORTHOPAEDIC SURGERY

## 2021-02-09 PROCEDURE — 73110 X-RAY EXAM OF WRIST: CPT

## 2021-02-09 RX ADMIN — BETAMETHASONE SODIUM PHOSPHATE AND BETAMETHASONE ACETATE 6 MG: 3; 3 INJECTION, SUSPENSION INTRA-ARTICULAR; INTRALESIONAL; INTRAMUSCULAR; SOFT TISSUE at 16:44

## 2021-02-09 RX ADMIN — LIDOCAINE HYDROCHLORIDE 1 ML: 10 INJECTION, SOLUTION INFILTRATION; PERINEURAL at 16:44

## 2021-02-09 NOTE — PROGRESS NOTES
Chief Complaint     Right wrist pain      History of Present Illness     Gloria Hull is a 52 y o  male who presents today with continued right wrist pain  He was provided a cock up wrist splint at his last visit, but states this seemed to aggravate his discomfort  Instead, he found another brace over the counter which he has been wearing during the day with activity such as going to the gym  He has tried the naproxen, but states this did not seem to help  States his pain continues to be along the ulnar wrist, worse with activity  Denies numbness or tingling        Past Medical History:   Diagnosis Date    Anxiety     "general work related"    Chronic pain disorder     Dislocated shoulder     originally approx 25 yrs ago//left    Exercises 3 to 4 times per week     4-5x/week    Hyperlipidemia     borderline    Impaired fasting glucose     Joint pain     Labral tear of shoulder     left with occas pain    Olecranon bursitis, left elbow     had surgery    Osteoarthritis     Shoulder arthritis     left    Vitamin D deficiency     pt not sure    Wears glasses     reading       Past Surgical History:   Procedure Laterality Date    ARTHROSCOPIC BICEPS TENODESIS Left 2/8/2018    Procedure: SHOULDER ARTHROSCOPY WITH BICEPS TENODESIS;  Surgeon: Katie Nicholas MD;  Location: [address] [place] [address];  Service: Orthopedics    FL INJECTION LEFT SHOULDER (ARTHROGRAM)  1/8/2020    ORTHOPEDIC SURGERY      UT REMOVAL OF ELBOW BURSA Left 10/3/2019    Procedure: EXCISION BURSA OLECRANON ELBOW;  Surgeon: Talat Russo MD;  Location: [address] [place] [address];  Service: Orthopedics    UT SHLDR ARTHROSCOP,SURG,REPAIR,SLAP LESION Left 2/10/2020    Procedure: ARTHROSCOPY SHOULDER, posterior labral repair;  Surgeon: Araceli Atkinson MD;  Location: [facility];  Service: Orthopedics    ROTATOR CUFF REPAIR      ROTATOR CUFF REPAIR Left     x2 last one 2005    SEPTOPLASTY      WISDOM TOOTH EXTRACTION         No Known Allergies    Current Outpatient Medications on File Prior to Visit   Medication Sig Dispense Refill    acyclovir (ZOVIRAX) 5 % ointment Apply topically every 4 (four) hours (Patient taking differently: Apply topically as needed ) 15 g 0    atorvastatin (LIPITOR) 10 mg tablet Take 1 tablet (10 mg total) by mouth daily at bedtime 30 tablet 5    Cholecalciferol (VITAMIN D) 2000 units CAPS Take by mouth      glucosamine-chondroitin 500-400 MG tablet Take 1 tablet by mouth every morning      Multiple Vitamin (MULTIVITAMIN) tablet Take 1 tablet by mouth daily      naproxen (NAPROSYN) 500 mg tablet Take 1 tablet (500 mg total) by mouth 2 (two) times a day with meals 60 tablet 0    Omega-3 Fatty Acids (FISH OIL) 1,000 mg Take 1,000 mg by mouth daily      testosterone (ANDROGEL) 1 62 % TD gel pump Apply 3 actuation (60 75 mg total) topically every morning use 3 PUMPS ON THE SKIN once daily 75 g 0    buPROPion (WELLBUTRIN XL) 300 mg 24 hr tablet Take 1 tablet (300 mg total) by mouth daily 90 tablet 3    propranolol (INDERAL) 20 mg tablet Take 1 tablet (20 mg total) by mouth daily as needed (anxiety, panic attack) 90 tablet 0    valACYclovir (VALTREX) 1,000 mg tablet Take 2 tablets (2,000 mg total) by mouth 2 (two) times a day as needed (cold sore) for up to 1 day 30 tablet 1     No current facility-administered medications on file prior to visit  Social History     Tobacco Use    Smoking status: Former Smoker     Years: 15 00     Types: Cigarettes     Quit date: 2007     Years since quittin 0    Smokeless tobacco: Never Used   Substance Use Topics    Alcohol use: Yes     Frequency: Monthly or less     Drinks per session: 1 or 2     Binge frequency: Never     Comment: socially    Drug use: Never       Family History   Problem Relation Age of Onset    No Known Problems Mother     Heart attack Father 79       Review of Systems     As stated in the HPI  All other systems were reviewed and are negative        Physical Exam     /74   Pulse 101   Ht 5' 11" (1 803 m)   Wt 101 kg (223 lb)   BMI 31 10 kg/m²     GENERAL: This is a well-developed, well-nourished, age-appropriate patient in no acute distress  The patient is alert and oriented x3  Pleasant and cooperative  Eyes: Anicteric sclerae  Extraocular movements appear intact  HENT: Nares are patent with no drainage  Lungs: There is equal chest rise on inspection  Breathing is non-labored with no audible wheezing  Cardiovascular: No cyanosis  No upper extremity lymphadema  Skin: Skin is warm to touch  No obvious skin lesions or rashes other than described below  Neurologic: No ataxia  Psychiatric: Mood and affect are appropriate    Right Upper Extremity:  No edema, erythema or ecchymosis  Patient has tenderness to palpation along ulnar fovea  No tenderness along ECU, DRUJ, FCU  Patient with equal mobility of DRUJ compared to contralateral side in neutral/pro/supination  Negative impaction signs  Mildly positive ECU synergy test  5/5 Motor to the APB, FDI, FDP2, FDP5, EDC  Sensation intact to light touch in the median, radial, and ulnar nerve distribution  Radial pulse 2+      Data Review     Results Reviewed     None             Imaging:  Xrays today were personally reviewed by me and show no signs of acute fracture or dislocation  No signs of ulnar  Positive variance  Assessment and Plan      Diagnoses and all orders for this visit:    TFCC (triangular fibrocartilage complex) injury, right, initial encounter  -     XR wrist 3+ vw right; Future         52year old male with suspected right wrist TFCC injury  Xrays reviewed and no signs of ulnar impaction  I discussed with the patient that this likely is TFCC injury based off his exam and negative xrays  For this, offered steroid injection versus getting MRI to further evaluate and confirm this   Explained that even if we were to get MRI, may still proceed with steroid injection afterwards initially as treatment plan  If patient does not get improvement with injection, consider surgical arthroscopy  Patient agrees to first try a steroid injection today  Patient advised to not overdue it with activities and allow the wrist to rest  Continue to brace as needed for activities  Follow Up: 4 weeks if still symptomatic    To Do Next Visit: Reevaluate symptoms    PROCEDURES PERFORMED:  Medium joint arthrocentesis: R radiocarpal  Universal Protocol:  Consent: Verbal consent obtained  Risks and benefits: risks, benefits and alternatives were discussed  Consent given by: patient  Time out: Immediately prior to procedure a "time out" was called to verify the correct patient, procedure, equipment, support staff and site/side marked as required    Patient understanding: patient states understanding of the procedure being performed  Patient identity confirmed: verbally with patient    Supporting Documentation  Indications: pain   Procedure Details  Location: wrist - R radiocarpal  Needle size: 25 G  Ultrasound guidance: no  Approach: dorsal  Medications administered: 1 mL lidocaine 1 %; 6 mg betamethasone acetate-betamethasone sodium phosphate 6 (3-3) mg/mL    Patient tolerance: patient tolerated the procedure well with no immediate complications  Dressing:  Sterile dressing applied

## 2021-02-15 RX ORDER — BETAMETHASONE SODIUM PHOSPHATE AND BETAMETHASONE ACETATE 3; 3 MG/ML; MG/ML
6 INJECTION, SUSPENSION INTRA-ARTICULAR; INTRALESIONAL; INTRAMUSCULAR; SOFT TISSUE
Status: COMPLETED | OUTPATIENT
Start: 2021-02-09 | End: 2021-02-09

## 2021-02-15 RX ORDER — LIDOCAINE HYDROCHLORIDE 10 MG/ML
1 INJECTION, SOLUTION INFILTRATION; PERINEURAL
Status: COMPLETED | OUTPATIENT
Start: 2021-02-09 | End: 2021-02-09

## 2021-03-09 ENCOUNTER — OFFICE VISIT (OUTPATIENT)
Dept: OBGYN CLINIC | Facility: MEDICAL CENTER | Age: 51
End: 2021-03-09
Payer: COMMERCIAL

## 2021-03-09 VITALS
HEIGHT: 71 IN | BODY MASS INDEX: 31.22 KG/M2 | HEART RATE: 69 BPM | DIASTOLIC BLOOD PRESSURE: 67 MMHG | SYSTOLIC BLOOD PRESSURE: 113 MMHG | WEIGHT: 223 LBS | TEMPERATURE: 98 F

## 2021-03-09 DIAGNOSIS — S69.81XA TFCC (TRIANGULAR FIBROCARTILAGE COMPLEX) INJURY, RIGHT, INITIAL ENCOUNTER: Primary | ICD-10-CM

## 2021-03-09 PROCEDURE — 99213 OFFICE O/P EST LOW 20 MIN: CPT | Performed by: ORTHOPAEDIC SURGERY

## 2021-03-09 NOTE — PROGRESS NOTES
Chief Complaint     Follow up right wrist pain      History of Present Illness     Hammad Chavez is a 52 y o  male who presents today for follow up of his right wrist pain  Patient received a steroid injection into the wrist for possible TFCC injury at his last visit and states this did help, but not completely  He states on a regular basis, his pain is improved 60-70% better  However, if he "tweaks" his wrist a certain way, it will still create a sharp ulnar sided wrist pain  He also has been wearing velcro wrist straps when exercising and has tried NSAIDs as well            Past Medical History:   Diagnosis Date    Anxiety     "general work related"    Chronic pain disorder     Dislocated shoulder     originally approx 25 yrs ago//left    Exercises 3 to 4 times per week     4-5x/week    Hyperlipidemia     borderline    Impaired fasting glucose     Joint pain     Labral tear of shoulder     left with occas pain    Olecranon bursitis, left elbow     had surgery    Osteoarthritis     Shoulder arthritis     left    Vitamin D deficiency     pt not sure    Wears glasses     reading       Past Surgical History:   Procedure Laterality Date    ARTHROSCOPIC BICEPS TENODESIS Left 2/8/2018    Procedure: SHOULDER ARTHROSCOPY WITH BICEPS TENODESIS;  Surgeon: Tab Mckenna MD;  Location: [address] [place] [address];  Service: Orthopedics    FL INJECTION LEFT SHOULDER (ARTHROGRAM)  1/8/2020    ORTHOPEDIC SURGERY      OR REMOVAL OF ELBOW BURSA Left 10/3/2019    Procedure: EXCISION BURSA OLECRANON ELBOW;  Surgeon: Mateusz Levin MD;  Location: [facility] OR;  Service: Orthopedics    OR SHLDR ARTHROSCOP,SURG,REPAIR,SLAP LESION Left 2/10/2020    Procedure: ARTHROSCOPY SHOULDER, posterior labral repair;  Surgeon: Mark Mcleroy MD;  Location: [facility];  Service: Orthopedics    ROTATOR CUFF REPAIR      ROTATOR CUFF REPAIR Left     x2 last one 2005    SEPTOPLASTY      WISDOM TOOTH EXTRACTION         No Known Allergies    Current Outpatient Medications on File Prior to Visit   Medication Sig Dispense Refill    acyclovir (ZOVIRAX) 5 % ointment Apply topically every 4 (four) hours (Patient taking differently: Apply topically as needed ) 15 g 0    atorvastatin (LIPITOR) 10 mg tablet Take 1 tablet (10 mg total) by mouth daily at bedtime 30 tablet 5    buPROPion (WELLBUTRIN XL) 300 mg 24 hr tablet Take 1 tablet (300 mg total) by mouth daily 90 tablet 3    Cholecalciferol (VITAMIN D) 2000 units CAPS Take by mouth      glucosamine-chondroitin 500-400 MG tablet Take 1 tablet by mouth every morning      Multiple Vitamin (MULTIVITAMIN) tablet Take 1 tablet by mouth daily      naproxen (NAPROSYN) 500 mg tablet Take 1 tablet (500 mg total) by mouth 2 (two) times a day with meals 60 tablet 0    Omega-3 Fatty Acids (FISH OIL) 1,000 mg Take 1,000 mg by mouth daily      propranolol (INDERAL) 20 mg tablet Take 1 tablet (20 mg total) by mouth daily as needed (anxiety, panic attack) 90 tablet 0    testosterone (ANDROGEL) 1 62 % TD gel pump Apply 3 actuation (60 75 mg total) topically every morning use 3 PUMPS ON THE SKIN once daily 75 g 0    valACYclovir (VALTREX) 1,000 mg tablet Take 2 tablets (2,000 mg total) by mouth 2 (two) times a day as needed (cold sore) for up to 1 day 30 tablet 1     No current facility-administered medications on file prior to visit  Social History     Tobacco Use    Smoking status: Former Smoker     Years: 15 00     Types: Cigarettes     Quit date: 2007     Years since quittin 0    Smokeless tobacco: Never Used   Substance Use Topics    Alcohol use: Yes     Frequency: Monthly or less     Drinks per session: 1 or 2     Binge frequency: Never     Comment: socially    Drug use: Never       Family History   Problem Relation Age of Onset    No Known Problems Mother     Heart attack Father 79       Review of Systems     As stated in the HPI   All other systems were reviewed and are negative  Physical Exam     Temp 98 °F (36 7 °C)   Ht 5' 11" (1 803 m)   Wt 101 kg (223 lb)   BMI 31 10 kg/m²     GENERAL: This is a well-developed, well-nourished, age-appropriate patient in no acute distress  The patient is alert and oriented x3  Pleasant and cooperative  Eyes: Anicteric sclerae  Extraocular movements appear intact  HENT: Nares are patent with no drainage  Lungs: There is equal chest rise on inspection  Breathing is non-labored with no audible wheezing  Cardiovascular: No cyanosis  No upper extremity lymphadema  Skin: Skin is warm to touch  No obvious skin lesions or rashes other than described below  Neurologic: No ataxia  Psychiatric: Mood and affect are appropriate  Right Upper Extremity:  No edema, erythema, ecchymosis  Negative tenderness to palpation of radiocarpal joint, DRUJ, pisotriquetral joint, ECU  Mild tenderness to palpation ulnar fovea  PT shuck negative  No instability of DRUJ  5/5 Motor to the APB, FDI, FDP2, FDP5, EDC  Sensation intact to light touch in the median, radial, and ulnar nerve distribution  Radial pulse 2+  Data Review     Results Reviewed     None             Imaging:  No new imaging to review    Assessment and Plan      Diagnoses and all orders for this visit:    TFCC (triangular fibrocartilage complex) injury, right, initial encounter       52year old male with TFCC injury  Patient's symptoms overall improved after steroid injection  I discussed with the patient that he could continue to note improvement from the injection  If patient's pain increases or if he can't do the things he wants to do, then that would be the time to proceed with further treatment  This would include potential MRI and wrist arthroscopy  I did briefly discuss wrist arthroscopy with the patient today  At this time, patient states overall he is doing well and would like to just continue to monitor this      Follow Up: 4-6 weeks if needed    To Do Next Visit: Reevaluate symptoms    PROCEDURES PERFORMED:  Procedures  No Procedures performed today

## 2021-03-10 DIAGNOSIS — Z23 ENCOUNTER FOR IMMUNIZATION: ICD-10-CM

## 2021-03-15 ENCOUNTER — IMMUNIZATIONS (OUTPATIENT)
Dept: FAMILY MEDICINE CLINIC | Facility: HOSPITAL | Age: 51
End: 2021-03-15

## 2021-03-15 DIAGNOSIS — Z23 ENCOUNTER FOR IMMUNIZATION: Primary | ICD-10-CM

## 2021-03-15 PROCEDURE — 91300 SARS-COV-2 / COVID-19 MRNA VACCINE (PFIZER-BIONTECH) 30 MCG: CPT

## 2021-03-15 PROCEDURE — 0001A SARS-COV-2 / COVID-19 MRNA VACCINE (PFIZER-BIONTECH) 30 MCG: CPT

## 2021-03-18 ENCOUNTER — APPOINTMENT (OUTPATIENT)
Dept: LAB | Facility: MEDICAL CENTER | Age: 51
End: 2021-03-18
Payer: COMMERCIAL

## 2021-03-18 DIAGNOSIS — F41.0 PANIC ATTACK: ICD-10-CM

## 2021-03-18 DIAGNOSIS — E78.2 MIXED HYPERLIPIDEMIA: ICD-10-CM

## 2021-03-18 DIAGNOSIS — R79.89 LOW TESTOSTERONE IN MALE: ICD-10-CM

## 2021-03-18 LAB
ALBUMIN SERPL BCP-MCNC: 4.4 G/DL (ref 3.5–5)
ALP SERPL-CCNC: 63 U/L (ref 46–116)
ALT SERPL W P-5'-P-CCNC: 48 U/L (ref 12–78)
ANION GAP SERPL CALCULATED.3IONS-SCNC: 4 MMOL/L (ref 4–13)
AST SERPL W P-5'-P-CCNC: 22 U/L (ref 5–45)
BILIRUB SERPL-MCNC: 0.33 MG/DL (ref 0.2–1)
BUN SERPL-MCNC: 13 MG/DL (ref 5–25)
CALCIUM SERPL-MCNC: 9.5 MG/DL (ref 8.3–10.1)
CHLORIDE SERPL-SCNC: 110 MMOL/L (ref 100–108)
CHOLEST SERPL-MCNC: 198 MG/DL (ref 50–200)
CO2 SERPL-SCNC: 27 MMOL/L (ref 21–32)
CREAT SERPL-MCNC: 1.02 MG/DL (ref 0.6–1.3)
ERYTHROCYTE [DISTWIDTH] IN BLOOD BY AUTOMATED COUNT: 11.9 % (ref 11.6–15.1)
FSH SERPL-ACNC: 2.7 MIU/ML (ref 0.7–10.8)
GFR SERPL CREATININE-BSD FRML MDRD: 87 ML/MIN/1.73SQ M
GLUCOSE P FAST SERPL-MCNC: 107 MG/DL (ref 65–99)
HCT VFR BLD AUTO: 46.4 % (ref 36.5–49.3)
HDLC SERPL-MCNC: 45 MG/DL
HGB BLD-MCNC: 15.2 G/DL (ref 12–17)
LDLC SERPL CALC-MCNC: 101 MG/DL (ref 0–100)
LH SERPL-ACNC: 1.4 MIU/ML (ref 1.2–10.6)
MCH RBC QN AUTO: 30.5 PG (ref 26.8–34.3)
MCHC RBC AUTO-ENTMCNC: 32.8 G/DL (ref 31.4–37.4)
MCV RBC AUTO: 93 FL (ref 82–98)
NONHDLC SERPL-MCNC: 153 MG/DL
PLATELET # BLD AUTO: 336 THOUSANDS/UL (ref 149–390)
PMV BLD AUTO: 9.6 FL (ref 8.9–12.7)
POTASSIUM SERPL-SCNC: 4.8 MMOL/L (ref 3.5–5.3)
PROLACTIN SERPL-MCNC: 9.2 NG/ML (ref 2.5–17.4)
PROT SERPL-MCNC: 7.5 G/DL (ref 6.4–8.2)
PSA SERPL-MCNC: 0.8 NG/ML (ref 0–4)
RBC # BLD AUTO: 4.99 MILLION/UL (ref 3.88–5.62)
SODIUM SERPL-SCNC: 141 MMOL/L (ref 136–145)
T4 FREE SERPL-MCNC: 0.9 NG/DL (ref 0.76–1.46)
TRIGL SERPL-MCNC: 261 MG/DL
TSH SERPL DL<=0.05 MIU/L-ACNC: 1.78 UIU/ML (ref 0.36–3.74)
WBC # BLD AUTO: 5.12 THOUSAND/UL (ref 4.31–10.16)

## 2021-03-18 PROCEDURE — 84146 ASSAY OF PROLACTIN: CPT

## 2021-03-18 PROCEDURE — 80053 COMPREHEN METABOLIC PANEL: CPT

## 2021-03-18 PROCEDURE — 80061 LIPID PANEL: CPT

## 2021-03-18 PROCEDURE — 84270 ASSAY OF SEX HORMONE GLOBUL: CPT

## 2021-03-18 PROCEDURE — 84443 ASSAY THYROID STIM HORMONE: CPT

## 2021-03-18 PROCEDURE — 84403 ASSAY OF TOTAL TESTOSTERONE: CPT

## 2021-03-18 PROCEDURE — 84402 ASSAY OF FREE TESTOSTERONE: CPT

## 2021-03-18 PROCEDURE — 83002 ASSAY OF GONADOTROPIN (LH): CPT

## 2021-03-18 PROCEDURE — 85027 COMPLETE CBC AUTOMATED: CPT

## 2021-03-18 PROCEDURE — G0103 PSA SCREENING: HCPCS

## 2021-03-18 PROCEDURE — 83001 ASSAY OF GONADOTROPIN (FSH): CPT

## 2021-03-18 PROCEDURE — 84439 ASSAY OF FREE THYROXINE: CPT

## 2021-03-18 PROCEDURE — 36415 COLL VENOUS BLD VENIPUNCTURE: CPT

## 2021-03-19 LAB
SHBG SERPL-SCNC: 20.5 NMOL/L (ref 16.5–55.9)
TESTOST FREE SERPL-MCNC: 11.9 PG/ML (ref 6.8–21.5)
TESTOST SERPL-MCNC: 233 NG/DL (ref 264–916)

## 2021-03-22 ENCOUNTER — TELEPHONE (OUTPATIENT)
Dept: ENDOCRINOLOGY | Facility: CLINIC | Age: 51
End: 2021-03-22

## 2021-03-25 ENCOUNTER — OFFICE VISIT (OUTPATIENT)
Dept: ENDOCRINOLOGY | Facility: CLINIC | Age: 51
End: 2021-03-25
Payer: COMMERCIAL

## 2021-03-25 VITALS
WEIGHT: 225.6 LBS | DIASTOLIC BLOOD PRESSURE: 78 MMHG | SYSTOLIC BLOOD PRESSURE: 130 MMHG | HEART RATE: 76 BPM | BODY MASS INDEX: 31.58 KG/M2 | HEIGHT: 71 IN

## 2021-03-25 DIAGNOSIS — R79.89 LOW TESTOSTERONE IN MALE: Primary | ICD-10-CM

## 2021-03-25 PROCEDURE — 1036F TOBACCO NON-USER: CPT | Performed by: NURSE PRACTITIONER

## 2021-03-25 PROCEDURE — 99214 OFFICE O/P EST MOD 30 MIN: CPT | Performed by: NURSE PRACTITIONER

## 2021-03-25 PROCEDURE — 3008F BODY MASS INDEX DOCD: CPT | Performed by: NURSE PRACTITIONER

## 2021-03-25 NOTE — ASSESSMENT & PLAN NOTE
His testosterone levels have started to improve off androgel  He feels well  He will continue off androgel for time being   Will continue to monitor his free and total testosterone levels

## 2021-03-25 NOTE — PROGRESS NOTES
Established Patient Progress Note       Chief Complaint   Patient presents with    Hypogonadism        History of Present Illness:     Regi Shelby is a 52 y o  male with a history of hypogonadism  He was taking androgel 3 pumps daily but his symptoms were not improving  Was advised to stop as his free and total testosterone in 2019 prior to starting  Since being on has had low normal but normal free  Since stopping he reports he feels well  Denies decreased muscle mass or low energy  No ED  His labs off androgel showed some improvement in his free and total testosterone  Although total remains slightly low             Patient Active Problem List   Diagnosis    Hair loss    Mixed hyperlipidemia    Pain in joint of left shoulder    Superior glenoid labrum lesion of left shoulder    Depression with anxiety    Olecranon bursitis of left elbow    Low testosterone in male      Past Medical History:   Diagnosis Date    Anxiety     "general work related"    Chronic pain disorder     Dislocated shoulder     originally approx 25 yrs ago//left    Exercises 3 to 4 times per week     4-5x/week    Hyperlipidemia     borderline    Impaired fasting glucose     Joint pain     Labral tear of shoulder     left with occas pain    Olecranon bursitis, left elbow     had surgery    Osteoarthritis     Shoulder arthritis     left    Vitamin D deficiency     pt not sure    Wears glasses     reading      Past Surgical History:   Procedure Laterality Date    ARTHROSCOPIC BICEPS TENODESIS Left 2/8/2018    Procedure: SHOULDER ARTHROSCOPY WITH BICEPS TENODESIS;  Surgeon: Shazia Dewitt MD;  Location: [address] [place] [address];  Service: Orthopedics    FL INJECTION LEFT SHOULDER (ARTHROGRAM)  1/8/2020    ORTHOPEDIC SURGERY      [place] REMOVAL OF ELBOW BURSA Left 10/3/2019    Procedure: EXCISION BURSA OLECRANON ELBOW;  Surgeon: Raeann Leslie MD;  [facility];  Service: Orthopedics    [place] SHLDR ARTHROSCOP,SURG,REPAIR,SLAP LESION Left 2/10/2020    Procedure: ARTHROSCOPY SHOULDER, posterior labral repair;  Surgeon: Janene Syed MD;  Location: [facility];  Service: Orthopedics    ROTATOR CUFF REPAIR      ROTATOR CUFF REPAIR Left     x2 last one     SEPTOPLASTY      WISDOM TOOTH EXTRACTION        Family History   Problem Relation Age of Onset    No Known Problems Mother     Heart attack Father 79     Social History     Tobacco Use    Smoking status: Former Smoker     Years: 15 00     Types: Cigarettes     Quit date: 2007     Years since quittin 1    Smokeless tobacco: Never Used   Substance Use Topics    Alcohol use: Yes     Frequency: Monthly or less     Drinks per session: 1 or 2     Binge frequency: Never     Comment: socially     No Known Allergies    Current Outpatient Medications:     acyclovir (ZOVIRAX) 5 % ointment, Apply topically every 4 (four) hours, Disp: 15 g, Rfl: 0    atorvastatin (LIPITOR) 10 mg tablet, Take 1 tablet (10 mg total) by mouth daily at bedtime, Disp: 30 tablet, Rfl: 5    Cholecalciferol (VITAMIN D) 2000 units CAPS, Take by mouth, Disp: , Rfl:     glucosamine-chondroitin 500-400 MG tablet, Take 1 tablet by mouth every morning, Disp: , Rfl:     Multiple Vitamin (MULTIVITAMIN) tablet, Take 1 tablet by mouth daily, Disp: , Rfl:     Omega-3 Fatty Acids (FISH OIL) 1,000 mg, Take 1,000 mg by mouth daily, Disp: , Rfl:     buPROPion (WELLBUTRIN XL) 300 mg 24 hr tablet, Take 1 tablet (300 mg total) by mouth daily, Disp: 90 tablet, Rfl: 3    naproxen (NAPROSYN) 500 mg tablet, Take 1 tablet (500 mg total) by mouth 2 (two) times a day with meals, Disp: 60 tablet, Rfl: 0    propranolol (INDERAL) 20 mg tablet, Take 1 tablet (20 mg total) by mouth daily as needed (anxiety, panic attack), Disp: 90 tablet, Rfl: 0    testosterone (ANDROGEL) 1 62 % TD gel pump, Apply 3 actuation (60 75 mg total) topically every morning use 3 PUMPS ON THE SKIN once daily (Patient not taking: Reported on 3/25/2021), Disp: 75 g, Rfl: 0    valACYclovir (VALTREX) 1,000 mg tablet, Take 2 tablets (2,000 mg total) by mouth 2 (two) times a day as needed (cold sore) for up to 1 day, Disp: 30 tablet, Rfl: 1    Review of Systems   Constitutional: Negative for activity change, appetite change and fatigue  HENT: Negative for sore throat, trouble swallowing and voice change  Eyes: Negative for visual disturbance  Respiratory: Negative for choking, chest tightness and shortness of breath  Cardiovascular: Negative for chest pain, palpitations and leg swelling  Gastrointestinal: Negative for abdominal pain, constipation and diarrhea  Endocrine: Negative for cold intolerance, heat intolerance, polydipsia, polyphagia and polyuria  Genitourinary: Negative for frequency  Musculoskeletal: Negative for arthralgias and myalgias  Skin: Negative for rash  Neurological: Negative for dizziness and syncope  Hematological: Negative for adenopathy  Psychiatric/Behavioral: Negative for sleep disturbance  All other systems reviewed and are negative  Physical Exam:  Body mass index is 31 46 kg/m²  /78   Pulse 76   Ht 5' 11" (1 803 m)   Wt 102 kg (225 lb 9 6 oz)   BMI 31 46 kg/m²    Wt Readings from Last 3 Encounters:   03/25/21 102 kg (225 lb 9 6 oz)   03/09/21 101 kg (223 lb)   02/09/21 101 kg (223 lb)       Physical Exam  Vitals signs reviewed  Constitutional:       General: He is not in acute distress  Appearance: He is well-developed  HENT:      Head: Normocephalic and atraumatic  Eyes:      Conjunctiva/sclera: Conjunctivae normal       Pupils: Pupils are equal, round, and reactive to light  Neck:      Musculoskeletal: Normal range of motion and neck supple  Thyroid: No thyromegaly  Cardiovascular:      Rate and Rhythm: Normal rate and regular rhythm  Heart sounds: Normal heart sounds  No murmur  Pulmonary:      Effort: Pulmonary effort is normal  No respiratory distress  Breath sounds: Normal breath sounds  No wheezing or rales  Abdominal:      General: Bowel sounds are normal  There is no distension  Palpations: Abdomen is soft  Tenderness: There is no abdominal tenderness  Musculoskeletal: Normal range of motion  Lymphadenopathy:      Cervical: No cervical adenopathy  Skin:     General: Skin is warm and dry  Neurological:      Mental Status: He is alert and oriented to person, place, and time  Labs: Labs reviewed with patient       Component      Latest Ref Rng & Units 3/18/2021   TESTOSTERONE FREE      6 8 - 21 5 pg/mL 11 9   Testosterone, Total, LC/MS      264 - 916 ng/dL 233 (L)   PROLACTIN      2 5 - 17 4 ng/mL 9 2   SEX HORMONE BINDING GLOBULIN      16 5 - 55 9 nmol/L 20 5   LUTEINIZING HORMONE      1 2 - 10 6 mIU/mL 1 4   FSH, POC      0 7 - 10 8 mIU/mL 2 7           Impression & Plan:    Problem List Items Addressed This Visit        Other    Low testosterone in male - Primary     His testosterone levels have started to improve off androgel  He feels well  He will continue off androgel for time being  Will continue to monitor his free and total testosterone levels          Relevant Orders    Testosterone, free, total Lab Collect          Orders Placed This Encounter   Procedures    Testosterone, free, total Lab Collect     This is a patient instruction: Fasting preferred  Collections for men not undergoing treatment must be completed between 7am-9am ONLY  Collection time restrictions are not applicable to women or men already undergoing treatment  Standing Status:   Future     Standing Expiration Date:   3/25/2022         Discussed with the patient and all questioned fully answered  He will call me if any problems arise  Follow-up appointment in 6 months       Counseled patient on diagnostic results, prognosis, risk and benefit of treatment options, instruction for management, importance of treatment compliance, Risk  factor reduction and impressions      Eliana Marroquin

## 2021-04-05 ENCOUNTER — IMMUNIZATIONS (OUTPATIENT)
Dept: FAMILY MEDICINE CLINIC | Facility: HOSPITAL | Age: 51
End: 2021-04-05

## 2021-04-05 DIAGNOSIS — Z23 ENCOUNTER FOR IMMUNIZATION: Primary | ICD-10-CM

## 2021-04-05 PROCEDURE — 0002A SARS-COV-2 / COVID-19 MRNA VACCINE (PFIZER-BIONTECH) 30 MCG: CPT

## 2021-04-05 PROCEDURE — 91300 SARS-COV-2 / COVID-19 MRNA VACCINE (PFIZER-BIONTECH) 30 MCG: CPT

## 2021-04-19 DIAGNOSIS — E78.2 MIXED HYPERLIPIDEMIA: ICD-10-CM

## 2021-04-19 RX ORDER — ATORVASTATIN CALCIUM 10 MG/1
TABLET, FILM COATED ORAL
Qty: 30 TABLET | Refills: 5 | Status: SHIPPED | OUTPATIENT
Start: 2021-04-19 | End: 2021-04-28 | Stop reason: SDUPTHER

## 2021-04-28 ENCOUNTER — OFFICE VISIT (OUTPATIENT)
Dept: FAMILY MEDICINE CLINIC | Facility: CLINIC | Age: 51
End: 2021-04-28
Payer: COMMERCIAL

## 2021-04-28 VITALS
RESPIRATION RATE: 16 BRPM | HEIGHT: 71 IN | TEMPERATURE: 98.2 F | HEART RATE: 72 BPM | DIASTOLIC BLOOD PRESSURE: 60 MMHG | WEIGHT: 219 LBS | SYSTOLIC BLOOD PRESSURE: 118 MMHG | BODY MASS INDEX: 30.66 KG/M2

## 2021-04-28 DIAGNOSIS — F41.8 DEPRESSION WITH ANXIETY: ICD-10-CM

## 2021-04-28 DIAGNOSIS — E66.9 OBESITY (BMI 30-39.9): ICD-10-CM

## 2021-04-28 DIAGNOSIS — E78.2 MIXED HYPERLIPIDEMIA: ICD-10-CM

## 2021-04-28 DIAGNOSIS — R79.89 LOW TESTOSTERONE: ICD-10-CM

## 2021-04-28 DIAGNOSIS — R73.01 IFG (IMPAIRED FASTING GLUCOSE): Primary | ICD-10-CM

## 2021-04-28 PROBLEM — M70.22 OLECRANON BURSITIS OF LEFT ELBOW: Status: RESOLVED | Noted: 2019-09-25 | Resolved: 2021-04-28

## 2021-04-28 PROBLEM — S43.432A SUPERIOR GLENOID LABRUM LESION OF LEFT SHOULDER: Status: RESOLVED | Noted: 2018-01-03 | Resolved: 2021-04-28

## 2021-04-28 PROCEDURE — 1036F TOBACCO NON-USER: CPT | Performed by: FAMILY MEDICINE

## 2021-04-28 PROCEDURE — 99214 OFFICE O/P EST MOD 30 MIN: CPT | Performed by: FAMILY MEDICINE

## 2021-04-28 PROCEDURE — 3008F BODY MASS INDEX DOCD: CPT | Performed by: FAMILY MEDICINE

## 2021-04-28 RX ORDER — ATORVASTATIN CALCIUM 10 MG/1
10 TABLET, FILM COATED ORAL
Qty: 90 TABLET | Refills: 3 | Status: SHIPPED | OUTPATIENT
Start: 2021-04-28 | End: 2022-04-25

## 2021-04-28 NOTE — PROGRESS NOTES
Chief Complaint   Patient presents with    Follow-up     Routine 6 month follow up      Health Maintenance   Topic Date Due    BMI: Followup Plan  01/23/2021    HIV Screening  10/28/2022 (Originally 11/1/1985)    Annual Physical  10/27/2021    BMI: Adult  04/28/2022    DTaP,Tdap,and Td Vaccines (2 - Td) 10/29/2026    Influenza Vaccine  Completed    COVID-19 Vaccine  Completed    Pneumococcal Vaccine: Pediatrics (0 to 5 Years) and At-Risk Patients (6 to 59 Years)  Aged Out    HIB Vaccine  Aged Out    Hepatitis B Vaccine  Aged Out    IPV Vaccine  Aged Out    Hepatitis A Vaccine  Aged Out    Meningococcal ACWY Vaccine  Aged Out    HPV Vaccine  Aged Out   BMI Counseling: Body mass index is 30 54 kg/m²  The BMI is above normal  Nutrition recommendations include decreasing portion sizes, encouraging healthy choices of fruits and vegetables, decreasing fast food intake, consuming healthier snacks and limiting drinks that contain sugar  Exercise recommendations include moderate physical activity 150 minutes/week  No pharmacotherapy was ordered  Assessment/Plan:    Mixed hyperlipidemia  3/2021 Lipid 198/261/45/101 improved  Low fat diet  Continue atorvastatin 10mg qhs      IFG (impaired fasting glucose)  Low carb diet  Will check hgA1C  Depression with anxiety  Controlled  Continue wellbutrin 300mg QD and propranolol 20mg daily prn  Low testosterone  FU endocrinology  Off androgel now  Reviewed lab in 3/2021  CBC ok  CMP ok, glucose 107 elevated  Lipid 198/261/45/101 elevated TG  TSH normal  pSA 0 8 normal      Got flu shot this season  Got Covid19 vaccine  Denies SE  Got Tdap 2016  RTO in 6 months  Diagnoses and all orders for this visit:    IFG (impaired fasting glucose)  -     Comprehensive metabolic panel; Future  -     Hemoglobin A1C; Future  -     Lipid Panel with Direct LDL reflex; Future    Mixed hyperlipidemia  -     atorvastatin (LIPITOR) 10 mg tablet;  Take 1 tablet (10 mg total) by mouth daily at bedtime  -     Comprehensive metabolic panel; Future  -     Hemoglobin A1C; Future  -     Lipid Panel with Direct LDL reflex; Future    Depression with anxiety    Low testosterone    Obesity (BMI 30-39  9)          Subjective:      Patient ID: Taj Rodriguez is a 52 y o  male  HPI    Pt is here by himself  Chronic right wrist pain/TFCC---FU orthopedics  Got steroid shot which helped  Low testosterone---FU endocrinology  Stopped testosterone gel now  Monitor        Hyperlipidemia---Pt tried to follow low fat diet  He is on atorvastatin 10mg qhs  Denies side effects  Father had MI at age of 79       Depression/anxiety----for years  He is on wellbutrin 300mg daily  He uses propranolol 20mg daily prn for panic attack  Stable  Denies HI/SI  IFG---Tried to follow low carb diet  Very active  Exercise/gym 1 hour/day       No smoking    Alcohol 1 glass of wine daily    No drugs        The following portions of the patient's history were reviewed and updated as appropriate: allergies, current medications, past family history, past medical history, past social history, past surgical history and problem list     Review of Systems   Constitutional: Negative for appetite change, chills and fever  HENT: Negative for congestion, ear pain, sinus pain and sore throat  Eyes: Negative for discharge and itching  Respiratory: Negative for apnea, cough, chest tightness, shortness of breath and wheezing  Cardiovascular: Negative for chest pain, palpitations and leg swelling  Gastrointestinal: Negative for abdominal pain, anal bleeding, constipation, diarrhea, nausea and vomiting  Endocrine: Negative for cold intolerance, heat intolerance and polyuria  Genitourinary: Negative for difficulty urinating and dysuria  Musculoskeletal: Negative for arthralgias, back pain and myalgias  Skin: Negative for rash  Neurological: Negative for dizziness and headaches  Psychiatric/Behavioral: Negative for agitation  Objective:      /60   Pulse 72   Temp 98 2 °F (36 8 °C) (Tympanic)   Resp 16   Ht 5' 11" (1 803 m)   Wt 99 3 kg (219 lb)   BMI 30 54 kg/m²          Physical Exam  Constitutional:       Appearance: He is well-developed  HENT:      Head: Normocephalic and atraumatic  Eyes:      General:         Right eye: No discharge  Left eye: No discharge  Conjunctiva/sclera: Conjunctivae normal    Neck:      Musculoskeletal: Normal range of motion and neck supple  No muscular tenderness  Cardiovascular:      Rate and Rhythm: Normal rate and regular rhythm  Heart sounds: Normal heart sounds  No murmur  No friction rub  No gallop  Pulmonary:      Effort: Pulmonary effort is normal  No respiratory distress  Breath sounds: Normal breath sounds  No wheezing or rales  Abdominal:      General: Bowel sounds are normal  There is no distension  Palpations: Abdomen is soft  Tenderness: There is no abdominal tenderness  There is no guarding  Musculoskeletal: Normal range of motion  Lymphadenopathy:      Cervical: No cervical adenopathy  Neurological:      Mental Status: He is alert

## 2021-07-06 ENCOUNTER — OFFICE VISIT (OUTPATIENT)
Dept: OBGYN CLINIC | Facility: MEDICAL CENTER | Age: 51
End: 2021-07-06
Payer: COMMERCIAL

## 2021-07-06 VITALS
SYSTOLIC BLOOD PRESSURE: 115 MMHG | BODY MASS INDEX: 30.66 KG/M2 | WEIGHT: 219 LBS | HEART RATE: 88 BPM | DIASTOLIC BLOOD PRESSURE: 76 MMHG | HEIGHT: 71 IN

## 2021-07-06 DIAGNOSIS — S69.81XA TFCC (TRIANGULAR FIBROCARTILAGE COMPLEX) INJURY, RIGHT, INITIAL ENCOUNTER: Primary | ICD-10-CM

## 2021-07-06 PROCEDURE — 99213 OFFICE O/P EST LOW 20 MIN: CPT | Performed by: ORTHOPAEDIC SURGERY

## 2021-07-06 PROCEDURE — 3008F BODY MASS INDEX DOCD: CPT | Performed by: ORTHOPAEDIC SURGERY

## 2021-07-06 NOTE — PROGRESS NOTES
Chief Complaint     Right wrist pain      History of Present Illness     Ramona Medeiros is a 52 y o  male who presents to the office today for evaluation of right wrist pain  He received previous steroid injection on 02/09/21 which provided some relief  At his last visit, he wanted to just monitor this  He states, however, that it feels like the injection is wearing off and for the last couple of weeks he's noticed increased incidence of pain  States this continues to be on the ulnar side  He has not been able to pinpoint specific activities but states it can be something as simple as pulling up the comforter that causes this pain  In addition, he will use wrist straps and gel sleeves  He has tried NSAIDs and a wrist brace in the past as well without relief           Past Medical History:   Diagnosis Date    Anxiety     "general work related"    Chronic pain disorder     Dislocated shoulder     originally approx 25 yrs ago//left    Exercises 3 to 4 times per week     4-5x/week    Hyperlipidemia     borderline    Impaired fasting glucose     Joint pain     Labral tear of shoulder     left with occas pain    Olecranon bursitis, left elbow     had surgery    Osteoarthritis     Shoulder arthritis     left    Vitamin D deficiency     pt not sure    Wears glasses     reading       Past Surgical History:   Procedure Laterality Date    ARTHROSCOPIC BICEPS TENODESIS Left 2/8/2018    Procedure: SHOULDER ARTHROSCOPY WITH BICEPS TENODESIS;  Surgeon: Yu Gerard MD;  Location: [address] [place] [address];  Service: Orthopedics    FL INJECTION LEFT SHOULDER (ARTHROGRAM)  1/8/2020    ORTHOPEDIC SURGERY      MN REMOVAL OF ELBOW BURSA Left 10/3/2019    Procedure: EXCISION BURSA OLECRANON ELBOW;  Surgeon: Claudean Doss, MD;  Location: [address] [place] [address];  Service: Orthopedics    MN SHLDR ARTHROSCOP,SURG,REPAIR,SLAP LESION Left 2/10/2020    Procedure: ARTHROSCOPY SHOULDER, posterior labral repair;  Surgeon: Anson Holter, MD; Location: [facility];  Service: Orthopedics    ROTATOR CUFF REPAIR      ROTATOR CUFF REPAIR Left     x2 last one     SEPTOPLASTY      WISDOM TOOTH EXTRACTION         No Known Allergies    Current Outpatient Medications on File Prior to Visit   Medication Sig Dispense Refill    atorvastatin (LIPITOR) 10 mg tablet Take 1 tablet (10 mg total) by mouth daily at bedtime 90 tablet 3    Cholecalciferol (VITAMIN D) 2000 units CAPS Take by mouth      glucosamine-chondroitin 500-400 MG tablet Take 1 tablet by mouth every morning      Multiple Vitamin (MULTIVITAMIN) tablet Take 1 tablet by mouth daily      Omega-3 Fatty Acids (FISH OIL) 1,000 mg Take 1,000 mg by mouth daily      acyclovir (ZOVIRAX) 5 % ointment Apply topically every 4 (four) hours 15 g 0    buPROPion (WELLBUTRIN XL) 300 mg 24 hr tablet Take 1 tablet (300 mg total) by mouth daily 90 tablet 3    naproxen (NAPROSYN) 500 mg tablet Take 1 tablet (500 mg total) by mouth 2 (two) times a day with meals 60 tablet 0    propranolol (INDERAL) 20 mg tablet Take 1 tablet (20 mg total) by mouth daily as needed (anxiety, panic attack) 90 tablet 0    valACYclovir (VALTREX) 1,000 mg tablet Take 2 tablets (2,000 mg total) by mouth 2 (two) times a day as needed (cold sore) for up to 1 day 30 tablet 1     No current facility-administered medications on file prior to visit  Social History     Tobacco Use    Smoking status: Former Smoker     Years: 15 00     Types: Cigarettes     Quit date: 2007     Years since quittin 4    Smokeless tobacco: Never Used   Vaping Use    Vaping Use: Never used   Substance Use Topics    Alcohol use: Yes     Comment: socially    Drug use: Never       Family History   Problem Relation Age of Onset    No Known Problems Mother     Heart attack Father 79       Review of Systems     As stated in the HPI  All other systems were reviewed and are negative        Physical Exam     /76   Pulse 88   Ht 5' 11" (1 803 m)   Wt 99 3 kg (219 lb)   BMI 30 54 kg/m²     GENERAL: This is a well-developed, well-nourished, age-appropriate patient in no acute distress  The patient is alert and oriented x3  Pleasant and cooperative  Eyes: Anicteric sclerae  Extraocular movements appear intact  HENT: Nares are patent with no drainage  Lungs: There is equal chest rise on inspection  Breathing is non-labored with no audible wheezing  Cardiovascular: No cyanosis  No upper extremity lymphadema  Skin: Skin is warm to touch  No obvious skin lesions or rashes other than described below  Neurologic: No ataxia  Psychiatric: Mood and affect are appropriate  Right Upper Extremity:  No edema, erythema, ecchymosis  Negative tenderness to palpation of radiocarpal joint, pisotriquetral joint, ECU  Negative impaction signs  Stable DRUJ in neutral/pronation/supination  Mild discomfort to palpation of the ulnar fovea  5/5 Motor to the APB, FDI, FDP2, FDP5, EDC  Sensation intact to light touch in the median, radial, and ulnar nerve distribution  Radial pulse 2+  Data Review     Labs:  None    Electrodiagnostic Testing:  None    Imaging:  No new images to review    Assessment and Plan      Diagnoses and all orders for this visit:    TFCC (triangular fibrocartilage complex) injury, right, initial encounter       52year old male with right wrist TFCC injury  I discussed that since the patient continues to have discomfort despite nonoperative measures, we could at this point proceed with obtaining an MRI to further evaluate the cause of patient's pain  I discussed the possibility of wrist arthroscopy with debridement versus repair with the patient as well  Patient agrees to proceed with an MRI  Patient states he may have to get this performed out of network secondary to his insurance  I discussed that this is OK as long as he requests to get a copy of his disc and brings it with him at his follow up appointment   I also discussed that since I will be leaving the practice, the patient should follow up with one of my surgical partners to go over the MRI results and establish a further treatment plan  Follow Up:  After testing with one of my partners    To Do Next Visit: Go over MRI results and further treatment plan    PROCEDURES PERFORMED:  Procedures  No Procedures performed today

## 2021-07-20 NOTE — NURSING NOTE
Call placed to patient to discuss upcoming right wrist arthrogram at [facility]  Allergies reviewed and verified that patient does not currently take any anticoagulant medications  Pre procedure instructions including diet and taking own medications discussed with patient  Patient instructed that he may eat normally and take medications as usual before the procedure  Patient verbalizes understanding  Patient has had an arthrogram before no further questions at this time  Reminded of the location, date and time of the expected procedure  Contact number provided in case patient has any further questions

## 2021-07-27 ENCOUNTER — TELEPHONE (OUTPATIENT)
Dept: OBGYN CLINIC | Facility: MEDICAL CENTER | Age: 51
End: 2021-07-27

## 2021-07-27 ENCOUNTER — TELEPHONE (OUTPATIENT)
Dept: OBGYN CLINIC | Facility: HOSPITAL | Age: 51
End: 2021-07-27

## 2021-07-27 NOTE — TELEPHONE ENCOUNTER
Returned call to patient and explained it is pending denial; we provided all the info and will await the decision  Patient understood he may need to reschedule if it is formally denied

## 2021-07-27 NOTE — TELEPHONE ENCOUNTER
Patient sees Dr Óscar Lucas    Patient called stating his MRI was denied by his insurance due to lack of information   Patient would like to know if there's any way to get it approved before his MRI scheduled for tomorrow, 7/28 @ 2pm     Call back # [phone]

## 2021-07-27 NOTE — TELEPHONE ENCOUNTER
Tried calling patient but did not answer  Left VM  Study originally denied secondary to not having records  These were faxed on 7/22 per   We checked the status and it currently is "in review"  I did explain that I can't guarantee that they will review this prior to his appointment and that he may want to reschedule just to be on the safe side  I also explained that we'll keep an eye out on the status and if they need anything further from us to get this approved

## 2021-07-28 ENCOUNTER — HOSPITAL ENCOUNTER (OUTPATIENT)
Dept: RADIOLOGY | Facility: HOSPITAL | Age: 51
Discharge: HOME | End: 2021-07-28

## 2021-08-17 NOTE — NURSING NOTE
Patient returned call and left message stating " I had spoken   with someone about two weeks ago, I familiar with the protocol  I have had this done about six times, I am good with everything   If I should have any questions I will reach out to you, thank you for the call "

## 2021-08-25 ENCOUNTER — HOSPITAL ENCOUNTER (OUTPATIENT)
Dept: RADIOLOGY | Facility: HOSPITAL | Age: 51
Discharge: HOME | End: 2021-08-25
Payer: COMMERCIAL

## 2021-08-25 DIAGNOSIS — S69.81XA TFCC (TRIANGULAR FIBROCARTILAGE COMPLEX) INJURY, RIGHT, INITIAL ENCOUNTER: ICD-10-CM

## 2021-08-25 PROCEDURE — 77002 NEEDLE LOCALIZATION BY XRAY: CPT

## 2021-08-25 PROCEDURE — 25246 INJECTION FOR WRIST X-RAY: CPT

## 2021-08-25 PROCEDURE — A9585 GADOBUTROL INJECTION: HCPCS | Performed by: PHYSICIAN ASSISTANT

## 2021-08-25 PROCEDURE — 73222 MRI JOINT UPR EXTREM W/DYE: CPT

## 2021-08-25 PROCEDURE — G1004 CDSM NDSC: HCPCS

## 2021-08-25 RX ORDER — LIDOCAINE HYDROCHLORIDE 10 MG/ML
10 INJECTION, SOLUTION EPIDURAL; INFILTRATION; INTRACAUDAL; PERINEURAL
Status: COMPLETED | OUTPATIENT
Start: 2021-08-25 | End: 2021-08-25

## 2021-08-25 RX ORDER — SODIUM CHLORIDE 9 MG/ML
50 INJECTION INTRAVENOUS
Status: COMPLETED | OUTPATIENT
Start: 2021-08-25 | End: 2021-08-25

## 2021-08-25 RX ADMIN — IOHEXOL 2 ML: 300 INJECTION, SOLUTION INTRAVENOUS at 14:55

## 2021-08-25 RX ADMIN — GADOBUTROL 2 ML: 604.72 INJECTION INTRAVENOUS at 14:55

## 2021-08-25 RX ADMIN — LIDOCAINE HYDROCHLORIDE 5 ML: 10 INJECTION, SOLUTION EPIDURAL; INFILTRATION; INTRACAUDAL; PERINEURAL at 14:56

## 2021-08-25 RX ADMIN — SODIUM CHLORIDE 5 ML: 9 INJECTION, SOLUTION INTRAMUSCULAR; INTRAVENOUS; SUBCUTANEOUS at 14:57

## 2021-09-13 ENCOUNTER — TELEPHONE (OUTPATIENT)
Dept: OBGYN CLINIC | Facility: HOSPITAL | Age: 51
End: 2021-09-13

## 2021-09-13 NOTE — TELEPHONE ENCOUNTER
DR Lynette Jack  RE: appt cancelled? # [phone]    Patient states his appt on 09 14 w/ Dr Lynette Jack to review R wrist MRI was going to be cancelled because Dr Lynette Jack advised patient should be seen by Dr Veronika Jin    Currently, patient is still scheudled with DR Lynette Jack for tomorrow, 09 14  Should he be rescheduled with DR Veronika Jin? ?

## 2021-09-14 NOTE — TELEPHONE ENCOUNTER
Yes appointment with Dr Paul Aiken should be cancelled patient can be scheduled at next available with Dr Juan Saenz

## 2021-10-26 ENCOUNTER — APPOINTMENT (OUTPATIENT)
Dept: LAB | Facility: MEDICAL CENTER | Age: 51
End: 2021-10-26
Payer: COMMERCIAL

## 2021-10-26 DIAGNOSIS — E78.2 MIXED HYPERLIPIDEMIA: ICD-10-CM

## 2021-10-26 DIAGNOSIS — R73.01 IFG (IMPAIRED FASTING GLUCOSE): ICD-10-CM

## 2021-10-26 LAB
ALBUMIN SERPL BCP-MCNC: 4.1 G/DL (ref 3.5–5)
ALP SERPL-CCNC: 63 U/L (ref 46–116)
ALT SERPL W P-5'-P-CCNC: 29 U/L (ref 12–78)
ANION GAP SERPL CALCULATED.3IONS-SCNC: 5 MMOL/L (ref 4–13)
AST SERPL W P-5'-P-CCNC: 20 U/L (ref 5–45)
BILIRUB SERPL-MCNC: 0.7 MG/DL (ref 0.2–1)
BUN SERPL-MCNC: 18 MG/DL (ref 5–25)
CALCIUM SERPL-MCNC: 9.3 MG/DL (ref 8.3–10.1)
CHLORIDE SERPL-SCNC: 105 MMOL/L (ref 100–108)
CHOLEST SERPL-MCNC: 209 MG/DL (ref 50–200)
CO2 SERPL-SCNC: 27 MMOL/L (ref 21–32)
CREAT SERPL-MCNC: 1.09 MG/DL (ref 0.6–1.3)
EST. AVERAGE GLUCOSE BLD GHB EST-MCNC: 103 MG/DL
GFR SERPL CREATININE-BSD FRML MDRD: 80 ML/MIN/1.73SQ M
GLUCOSE P FAST SERPL-MCNC: 108 MG/DL (ref 65–99)
HBA1C MFR BLD: 5.2 %
HDLC SERPL-MCNC: 45 MG/DL
LDLC SERPL CALC-MCNC: 121 MG/DL (ref 0–100)
POTASSIUM SERPL-SCNC: 4.9 MMOL/L (ref 3.5–5.3)
PROT SERPL-MCNC: 7.1 G/DL (ref 6.4–8.2)
SODIUM SERPL-SCNC: 137 MMOL/L (ref 136–145)
TRIGL SERPL-MCNC: 214 MG/DL

## 2021-10-26 PROCEDURE — 80053 COMPREHEN METABOLIC PANEL: CPT

## 2021-10-26 PROCEDURE — 83036 HEMOGLOBIN GLYCOSYLATED A1C: CPT

## 2021-10-26 PROCEDURE — 36415 COLL VENOUS BLD VENIPUNCTURE: CPT

## 2021-10-26 PROCEDURE — 80061 LIPID PANEL: CPT

## 2021-10-28 ENCOUNTER — OFFICE VISIT (OUTPATIENT)
Dept: FAMILY MEDICINE CLINIC | Facility: CLINIC | Age: 51
End: 2021-10-28
Payer: COMMERCIAL

## 2021-10-28 VITALS
OXYGEN SATURATION: 99 % | HEART RATE: 96 BPM | HEIGHT: 69 IN | TEMPERATURE: 97.3 F | WEIGHT: 211 LBS | SYSTOLIC BLOOD PRESSURE: 120 MMHG | BODY MASS INDEX: 31.25 KG/M2 | DIASTOLIC BLOOD PRESSURE: 84 MMHG | RESPIRATION RATE: 16 BRPM

## 2021-10-28 DIAGNOSIS — R73.01 IFG (IMPAIRED FASTING GLUCOSE): ICD-10-CM

## 2021-10-28 DIAGNOSIS — E78.2 MIXED HYPERLIPIDEMIA: Primary | ICD-10-CM

## 2021-10-28 DIAGNOSIS — Z11.59 NEED FOR HEPATITIS C SCREENING TEST: ICD-10-CM

## 2021-10-28 DIAGNOSIS — E66.9 OBESITY (BMI 30-39.9): ICD-10-CM

## 2021-10-28 DIAGNOSIS — Z23 ENCOUNTER FOR IMMUNIZATION: ICD-10-CM

## 2021-10-28 DIAGNOSIS — F41.8 DEPRESSION WITH ANXIETY: ICD-10-CM

## 2021-10-28 DIAGNOSIS — Z00.00 ANNUAL PHYSICAL EXAM: ICD-10-CM

## 2021-10-28 PROBLEM — R79.89 LOW TESTOSTERONE: Status: RESOLVED | Noted: 2019-10-17 | Resolved: 2021-10-28

## 2021-10-28 PROCEDURE — 3725F SCREEN DEPRESSION PERFORMED: CPT | Performed by: FAMILY MEDICINE

## 2021-10-28 PROCEDURE — 99396 PREV VISIT EST AGE 40-64: CPT | Performed by: FAMILY MEDICINE

## 2021-10-28 PROCEDURE — 1036F TOBACCO NON-USER: CPT | Performed by: FAMILY MEDICINE

## 2021-10-28 PROCEDURE — 99214 OFFICE O/P EST MOD 30 MIN: CPT | Performed by: FAMILY MEDICINE

## 2021-10-28 PROCEDURE — 3008F BODY MASS INDEX DOCD: CPT | Performed by: FAMILY MEDICINE

## 2021-10-28 PROCEDURE — 90686 IIV4 VACC NO PRSV 0.5 ML IM: CPT

## 2021-10-28 PROCEDURE — 90471 IMMUNIZATION ADMIN: CPT

## 2021-11-24 ENCOUNTER — OFFICE VISIT (OUTPATIENT)
Dept: OBGYN CLINIC | Facility: HOSPITAL | Age: 51
End: 2021-11-24
Payer: COMMERCIAL

## 2021-11-24 VITALS
SYSTOLIC BLOOD PRESSURE: 114 MMHG | WEIGHT: 211.8 LBS | HEIGHT: 69 IN | DIASTOLIC BLOOD PRESSURE: 69 MMHG | BODY MASS INDEX: 31.37 KG/M2 | HEART RATE: 80 BPM

## 2021-11-24 DIAGNOSIS — S69.81XA TFCC (TRIANGULAR FIBROCARTILAGE COMPLEX) INJURY, RIGHT, INITIAL ENCOUNTER: Primary | ICD-10-CM

## 2021-11-24 PROCEDURE — 1036F TOBACCO NON-USER: CPT | Performed by: ORTHOPAEDIC SURGERY

## 2021-11-24 PROCEDURE — 3008F BODY MASS INDEX DOCD: CPT | Performed by: ORTHOPAEDIC SURGERY

## 2021-11-24 PROCEDURE — 99214 OFFICE O/P EST MOD 30 MIN: CPT | Performed by: ORTHOPAEDIC SURGERY

## 2022-04-25 DIAGNOSIS — E78.2 MIXED HYPERLIPIDEMIA: ICD-10-CM

## 2022-04-25 RX ORDER — ATORVASTATIN CALCIUM 10 MG/1
TABLET, FILM COATED ORAL
Qty: 90 TABLET | Refills: 3 | Status: SHIPPED | OUTPATIENT
Start: 2022-04-25

## 2022-08-19 DIAGNOSIS — E78.2 MIXED HYPERLIPIDEMIA: ICD-10-CM

## 2022-08-19 RX ORDER — ATORVASTATIN CALCIUM 10 MG/1
10 TABLET, FILM COATED ORAL
Qty: 90 TABLET | Refills: 3 | Status: SHIPPED | OUTPATIENT
Start: 2022-08-19

## 2022-09-07 ENCOUNTER — DOCUMENTATION (OUTPATIENT)
Dept: OTHER | Facility: HOSPITAL | Age: 52
End: 2022-09-07

## 2022-09-07 DIAGNOSIS — M72.2 PLANTAR FASCIITIS OF RIGHT FOOT: Primary | ICD-10-CM

## 2022-09-08 ENCOUNTER — APPOINTMENT (OUTPATIENT)
Dept: RADIOLOGY | Facility: MEDICAL CENTER | Age: 52
End: 2022-09-08

## 2022-09-08 DIAGNOSIS — M72.2 PLANTAR FASCIITIS OF RIGHT FOOT: ICD-10-CM

## 2022-09-08 PROCEDURE — 73650 X-RAY EXAM OF HEEL: CPT

## 2022-12-21 ENCOUNTER — TELEMEDICINE (OUTPATIENT)
Dept: FAMILY MEDICINE CLINIC | Facility: CLINIC | Age: 52
End: 2022-12-21

## 2022-12-21 DIAGNOSIS — E78.2 MIXED HYPERLIPIDEMIA: ICD-10-CM

## 2022-12-21 DIAGNOSIS — J01.10 ACUTE NON-RECURRENT FRONTAL SINUSITIS: Primary | ICD-10-CM

## 2022-12-21 DIAGNOSIS — E66.9 OBESITY (BMI 30-39.9): ICD-10-CM

## 2022-12-21 RX ORDER — AZITHROMYCIN 250 MG/1
TABLET, FILM COATED ORAL
Qty: 6 TABLET | Refills: 0 | Status: SHIPPED | OUTPATIENT
Start: 2022-12-21 | End: 2022-12-26

## 2022-12-21 NOTE — PROGRESS NOTES
Virtual Regular Visit    Verification of patient location:    Patient is located in the following state in which I hold an active license [place]      Assessment/Plan:    Problem List Items Addressed This Visit        Other    Mixed hyperlipidemia    Relevant Orders    Lipid Panel with Direct LDL reflex    Obesity (BMI 30-39  9)    Relevant Orders    Basic metabolic panel    CBC and differential   Other Visit Diagnoses     Acute non-recurrent frontal sinusitis    -  Primary    Relevant Medications    azithromycin (Zithromax) 250 mg tablet               Reason for visit is   Chief Complaint   Patient presents with   • Cold Like Symptoms     Head pressure, runny nose, NO chest congestion or sore throat   • Virtual Regular Visit        Encounter provider Bertin Owens, [facility]    Provider located at [address] [place] [id number]      Recent Visits  No visits were found meeting these conditions  Showing recent visits within past 7 days and meeting all other requirements  Today's Visits  Date Type Provider Dept   12/21/22 Telemedicine Cheryle Ache, [address] today's visits and meeting all other requirements  Future Appointments  No visits were found meeting these conditions  Showing future appointments within next 150 days and meeting all other requirements       The patient was identified by name and date of birth  Patria Peck was informed that this is a telemedicine visit and that the visit is being conducted through the [employer]  He agrees to proceed     My office door was closed  No one else was in the room  He acknowledged consent and understanding of privacy and security of the video platform  The patient has agreed to participate and understands they can discontinue the visit at any time  Patient is aware this is a billable service       Subjective  Patria Peck is a 50 y o  male with symptoms of sinus infection for four days  Discussed in the above assessment and plan         Patient believes he has a Sinus infection , no chest congestion  Taking sudafed for three days  Had covid in August   No fevers  Tested negative for covid  Got flu shot this year at [facility]  Symptoms started on 12/17  Occasional cough  He would like zithromax  I did explain to the patient the indications/criteria for treatment of sinus infection with antibiotics  I do not believe his symptoms warrant an antibiotic at this time but the patient would like zithromax sent to the pharmacy  This was sent in for the patient  In addition, the patient has not been seen in the office since April of 2021  Blood work ordered  Instructed him to contact the office to make an appointment          Past Medical History:   Diagnosis Date   • Anxiety     "general work related"   • Chronic pain disorder    • Dislocated shoulder     originally approx 25 yrs ago//left   • Exercises 3 to 4 times per week     4-5x/week   • Hyperlipidemia     borderline   • Impaired fasting glucose    • Joint pain    • Labral tear of shoulder     left with occas pain   • Olecranon bursitis, left elbow     had surgery   • Osteoarthritis    • Shoulder arthritis     left   • Vitamin D deficiency     pt not sure   • Wears glasses     reading       Past Surgical History:   Procedure Laterality Date   • ARTHROSCOPIC BICEPS TENODESIS Left 2/8/2018    Procedure: SHOULDER ARTHROSCOPY WITH BICEPS TENODESIS;  Surgeon: Monica Mancia MD;  Location: [address] [place] [address];  Service: Orthopedics   • FL INJECTION LEFT SHOULDER (ARTHROGRAM)  1/8/2020   • FL INJECTION RIGHT WRIST (ARTHROGRAM)  8/25/2021   • ORTHOPEDIC SURGERY     • MD REMOVAL OF ELBOW BURSA Left 10/3/2019    Procedure: EXCISION BURSA OLECRANON ELBOW;  Surgeon: Julieta Cazares MD;  Location: [address] [place] [address];  Service: Orthopedics   • MD SHLDR ARTHROSCOP,SURG,REPAIR,SLAP LESION Left 2/10/2020    Procedure: ARTHROSCOPY SHOULDER, posterior labral repair;  Surgeon: Boston Johnson MD;  [facility];  Service: Orthopedics   • ROTATOR CUFF REPAIR     • ROTATOR CUFF REPAIR Left     x2 last one 2005   • SEPTOPLASTY     • WISDOM TOOTH EXTRACTION         Current Outpatient Medications   Medication Sig Dispense Refill   • atorvastatin (LIPITOR) 10 mg tablet Take 1 tablet (10 mg total) by mouth daily at bedtime 90 tablet 3   • azithromycin (Zithromax) 250 mg tablet Take 2 tablets (500 mg total) by mouth daily for 1 day, THEN 1 tablet (250 mg total) daily for 4 days  6 tablet 0   • buPROPion (WELLBUTRIN XL) 300 mg 24 hr tablet TAKE 1 TABLET DAILY 90 tablet 3   • Cholecalciferol (VITAMIN D) 2000 units CAPS Take by mouth     • glucosamine-chondroitin 500-400 MG tablet Take 1 tablet by mouth every morning     • Multiple Vitamin (MULTIVITAMIN) tablet Take 1 tablet by mouth daily     • naproxen (NAPROSYN) 500 mg tablet Take 1 tablet (500 mg total) by mouth 2 (two) times a day with meals 60 tablet 0   • Omega-3 Fatty Acids (FISH OIL) 1,000 mg Take 1,000 mg by mouth daily     • propranolol (INDERAL) 20 mg tablet Take 1 tablet (20 mg total) by mouth daily as needed (anxiety, panic attack) 90 tablet 0     No current facility-administered medications for this visit  No Known Allergies    Review of Systems   Constitutional: Negative  Negative for fatigue  HENT: Positive for congestion, postnasal drip and sinus pressure  Negative for rhinorrhea and trouble swallowing  Eyes: Negative  Negative for visual disturbance  Respiratory: Positive for cough  Negative for choking and shortness of breath  Cardiovascular: Negative  Negative for chest pain  Gastrointestinal: Negative  Endocrine: Negative  Genitourinary: Negative  Musculoskeletal: Negative  Negative for arthralgias, back pain, myalgias and neck pain  Skin: Negative  Neurological: Negative for dizziness and headaches  Psychiatric/Behavioral: Negative          Video Exam    There were no vitals filed for this visit  Physical Exam  Constitutional:       General: He is not in acute distress  Appearance: Normal appearance  He is well-developed  HENT:      Head: Normocephalic and atraumatic  Nose: Nose normal    Pulmonary:      Effort: Pulmonary effort is normal    Musculoskeletal:      Cervical back: Normal range of motion  Neurological:      Mental Status: He is alert and oriented to person, place, and time  Psychiatric:         Behavior: Behavior normal          Thought Content: Thought content normal          Judgment: Judgment normal           I spent 15 minutes with patient today in which greater than 50% of the time was spent in counseling/coordination of care regarding treatment recommendations

## 2023-02-08 ENCOUNTER — APPOINTMENT (OUTPATIENT)
Dept: LAB | Facility: MEDICAL CENTER | Age: 53
End: 2023-02-08

## 2023-02-08 DIAGNOSIS — E66.9 OBESITY (BMI 30-39.9): ICD-10-CM

## 2023-02-08 DIAGNOSIS — E78.2 MIXED HYPERLIPIDEMIA: ICD-10-CM

## 2023-02-08 LAB
ANION GAP SERPL CALCULATED.3IONS-SCNC: 5 MMOL/L (ref 4–13)
BASOPHILS # BLD AUTO: 0.02 THOUSANDS/ÂΜL (ref 0–0.1)
BASOPHILS NFR BLD AUTO: 0 % (ref 0–1)
BUN SERPL-MCNC: 16 MG/DL (ref 5–25)
CALCIUM SERPL-MCNC: 9.4 MG/DL (ref 8.3–10.1)
CHLORIDE SERPL-SCNC: 104 MMOL/L (ref 96–108)
CHOLEST SERPL-MCNC: 179 MG/DL
CO2 SERPL-SCNC: 25 MMOL/L (ref 21–32)
CREAT SERPL-MCNC: 1.01 MG/DL (ref 0.6–1.3)
EOSINOPHIL # BLD AUTO: 0.08 THOUSAND/ÂΜL (ref 0–0.61)
EOSINOPHIL NFR BLD AUTO: 2 % (ref 0–6)
ERYTHROCYTE [DISTWIDTH] IN BLOOD BY AUTOMATED COUNT: 12.1 % (ref 11.6–15.1)
GFR SERPL CREATININE-BSD FRML MDRD: 86 ML/MIN/1.73SQ M
GLUCOSE P FAST SERPL-MCNC: 130 MG/DL (ref 65–99)
HCT VFR BLD AUTO: 46.6 % (ref 36.5–49.3)
HDLC SERPL-MCNC: 48 MG/DL
HGB BLD-MCNC: 15.7 G/DL (ref 12–17)
IMM GRANULOCYTES # BLD AUTO: 0.01 THOUSAND/UL (ref 0–0.2)
IMM GRANULOCYTES NFR BLD AUTO: 0 % (ref 0–2)
LDLC SERPL CALC-MCNC: 100 MG/DL (ref 0–100)
LYMPHOCYTES # BLD AUTO: 1.81 THOUSANDS/ÂΜL (ref 0.6–4.47)
LYMPHOCYTES NFR BLD AUTO: 36 % (ref 14–44)
MCH RBC QN AUTO: 30.4 PG (ref 26.8–34.3)
MCHC RBC AUTO-ENTMCNC: 33.7 G/DL (ref 31.4–37.4)
MCV RBC AUTO: 90 FL (ref 82–98)
MONOCYTES # BLD AUTO: 0.62 THOUSAND/ÂΜL (ref 0.17–1.22)
MONOCYTES NFR BLD AUTO: 12 % (ref 4–12)
NEUTROPHILS # BLD AUTO: 2.47 THOUSANDS/ÂΜL (ref 1.85–7.62)
NEUTS SEG NFR BLD AUTO: 50 % (ref 43–75)
NRBC BLD AUTO-RTO: 0 /100 WBCS
PLATELET # BLD AUTO: 332 THOUSANDS/UL (ref 149–390)
PMV BLD AUTO: 9.7 FL (ref 8.9–12.7)
POTASSIUM SERPL-SCNC: 4.4 MMOL/L (ref 3.5–5.3)
RBC # BLD AUTO: 5.16 MILLION/UL (ref 3.88–5.62)
SODIUM SERPL-SCNC: 134 MMOL/L (ref 135–147)
TRIGL SERPL-MCNC: 154 MG/DL
WBC # BLD AUTO: 5.01 THOUSAND/UL (ref 4.31–10.16)

## 2023-02-22 ENCOUNTER — OFFICE VISIT (OUTPATIENT)
Dept: FAMILY MEDICINE CLINIC | Facility: CLINIC | Age: 53
End: 2023-02-22

## 2023-02-22 VITALS
HEART RATE: 84 BPM | WEIGHT: 207.6 LBS | DIASTOLIC BLOOD PRESSURE: 80 MMHG | RESPIRATION RATE: 18 BRPM | BODY MASS INDEX: 32.58 KG/M2 | SYSTOLIC BLOOD PRESSURE: 118 MMHG | TEMPERATURE: 97.2 F | HEIGHT: 67 IN

## 2023-02-22 DIAGNOSIS — E78.2 MIXED HYPERLIPIDEMIA: Primary | ICD-10-CM

## 2023-02-22 DIAGNOSIS — R73.01 IFG (IMPAIRED FASTING GLUCOSE): ICD-10-CM

## 2023-02-22 DIAGNOSIS — F41.8 DEPRESSION WITH ANXIETY: ICD-10-CM

## 2023-02-22 DIAGNOSIS — Z12.11 SCREEN FOR COLON CANCER: ICD-10-CM

## 2023-02-22 DIAGNOSIS — Z78.9 ALCOHOL USE: ICD-10-CM

## 2023-02-22 DIAGNOSIS — Z00.00 ANNUAL PHYSICAL EXAM: ICD-10-CM

## 2023-02-22 DIAGNOSIS — E66.9 OBESITY (BMI 30-39.9): ICD-10-CM

## 2023-02-22 LAB — SL AMB POCT HEMOGLOBIN AIC: 5.5 (ref ?–6.5)

## 2023-02-22 NOTE — PROGRESS NOTES
[address] [facility] PRACTICE    NAME: Shaun Kapadia  AGE: 50 y o  SEX: male  : 1970     DATE: 2023     Assessment and Plan:     Problem List Items Addressed This Visit        Endocrine    IFG (impaired fasting glucose)     Today HgA1C 5 5 good  Low carb diet  Relevant Orders    POCT hemoglobin A1c (Completed)       Other    Mixed hyperlipidemia - Primary     2023 Lipid improved  Low fat diet  Continue atorvastatin 10mg qhs  Depression with anxiety     Controlled  Not on medications now  Obesity (BMI 30-39  9)    Alcohol use     Limit to 2 drinks/day, 7 drinks/week  Other Visit Diagnoses     Screen for colon cancer        Relevant Orders    Ambulatory Referral to Colorectal Surgery    Annual physical exam              Immunizations and preventive care screenings were discussed with patient today  Appropriate education was printed on patient's after visit summary  Counseling:  Alcohol/drug use: discussed moderation in alcohol intake, the recommendations for healthy alcohol use, and avoidance of illicit drug use  Dental Health: discussed importance of regular tooth brushing, flossing, and dental visits  Injury prevention: discussed safety/seat belts, safety helmets, smoke detectors, carbon dioxide detectors, and smoking near bedding or upholstery  Sexual health: discussed sexually transmitted diseases, partner selection, use of condoms, avoidance of unintended pregnancy, and contraceptive alternatives  · Exercise: the importance of regular exercise/physical activity was discussed  Recommend exercise 3-5 times per week for at least 30 minutes  BMI Counseling: Body mass index is 32 05 kg/m²   The BMI is above normal  Nutrition recommendations include decreasing portion sizes, encouraging healthy choices of fruits and vegetables, decreasing fast food intake, consuming healthier snacks and limiting drinks that contain sugar  No pharmacotherapy was ordered  Rationale for BMI follow-up plan is due to patient being overweight or obese  Return in about 1 year (around 2024) for Annual physical      Chief Complaint:     Chief Complaint   Patient presents with   • Physical Exam     Preventative  • Follow-up     Routine overdue  Health Maintenance   Topic Date Due   • HIV Screening  Never done   • Colorectal Cancer Screening  Never done   • COVID-19 Vaccine (4 - Booster for Pfizer series) 2022   • BMI: Followup Plan  2022   • Annual Physical  10/28/2022   • Hepatitis C Screening  2023 (Originally 1970)   • BMI: Adult  2024   • DTaP,Tdap,and Td Vaccines (2 - Td or Tdap) 10/29/2026   • Influenza Vaccine  Completed   • Pneumococcal Vaccine: Pediatrics (0 to 5 Years) and At-Risk Patients (6 to 59 Years)  Aged Out   • HIB Vaccine  Aged Out   • IPV Vaccine  Aged Out   • Hepatitis A Vaccine  Aged Out   • Meningococcal ACWY Vaccine  Aged Out   • HPV Vaccine  Aged Out        History of Present Illness:     Adult Annual Physical   Patient here for a comprehensive physical exam  The patient reports see note  Diet and Physical Activity  · Diet/Nutrition: well balanced diet  · Exercise: 5-7 times a week on average and 1-2 hours on average  Depression Screening  PHQ-2/9 Depression Screening    Little interest or pleasure in doing things: 0 - not at all  Feeling down, depressed, or hopeless: 0 - not at all  Trouble falling or staying asleep, or sleeping too much: 0 - not at all  Feeling tired or having little energy: 0 - not at all  Poor appetite or overeatin - not at all  Feeling bad about yourself - or that you are a failure or have let yourself or your family down: 0 - not at all  Trouble concentrating on things, such as reading the newspaper or watching television: 0 - not at all  Moving or speaking so slowly that other people could have noticed   Or the opposite - being so fidgety or restless that you have been moving around a lot more than usual: 0 - not at all  Thoughts that you would be better off dead, or of hurting yourself in some way: 0 - not at all  PHQ-9 Score: 0   PHQ-9 Interpretation: No or Minimal depression        General Health  · Sleep: sleeps well  · Hearing: normal - bilateral   · Vision: FU eye doc   · Dental: regular dental visits   Health  · Symptoms include: none     Review of Systems:     Review of Systems   Constitutional: Negative for appetite change, chills and fever  HENT: Negative for congestion, ear pain, sinus pain and sore throat  Eyes: Negative for discharge and itching  Respiratory: Negative for apnea, cough, chest tightness, shortness of breath and wheezing  Cardiovascular: Negative for chest pain, palpitations and leg swelling  Gastrointestinal: Negative for abdominal pain, anal bleeding, constipation, diarrhea, nausea and vomiting  Endocrine: Negative for cold intolerance, heat intolerance and polyuria  Genitourinary: Negative for difficulty urinating and dysuria  Musculoskeletal: Negative for arthralgias, back pain and myalgias  Skin: Negative for rash  Neurological: Negative for dizziness and headaches  Psychiatric/Behavioral: Negative for agitation        Past Medical History:     Past Medical History:   Diagnosis Date   • Anxiety     "general work related"   • Chronic pain disorder    • Dislocated shoulder     originally approx 25 yrs ago//left   • Exercises 3 to 4 times per week     4-5x/week   • Hyperlipidemia     borderline   • Impaired fasting glucose    • Joint pain    • Labral tear of shoulder     left with occas pain   • Olecranon bursitis, left elbow     had surgery   • Osteoarthritis    • Shoulder arthritis     left   • Vitamin D deficiency     pt not sure   • Wears glasses     reading      Past Surgical History:     Past Surgical History:   Procedure Laterality Date   • ARTHROSCOPIC BICEPS TENODESIS Left 2018    Procedure: SHOULDER ARTHROSCOPY WITH BICEPS TENODESIS;  Surgeon: Maurizio Walton MD;  Location: [address] [place] [address];  Service: Orthopedics   • FL INJECTION LEFT SHOULDER (ARTHROGRAM)  2020   • FL INJECTION RIGHT WRIST (ARTHROGRAM)  2021   • ORTHOPEDIC SURGERY     • NJ EXCISION OLECRANON BURSA Left 10/3/2019    Procedure: EXCISION BURSA OLECRANON ELBOW;  Surgeon: Azael Hill MD;  Location: [address] [place] [address] MAIN OR;  Service: Orthopedics   • NJ SURGICAL ARTHROSCOPY SHOULDER REPAIR SLAP LESION Left 2/10/2020    Procedure: ARTHROSCOPY SHOULDER, posterior labral repair;  Surgeon: Joshua Chino MD;  Location: [facility] Main OR;  Service: Orthopedics   • ROTATOR CUFF REPAIR     • ROTATOR CUFF REPAIR Left     x2 last one    • SEPTOPLASTY     • WISDOM TOOTH EXTRACTION        Family History:     Family History   Problem Relation Age of Onset   • No Known Problems Mother    • Heart attack Father 79      Social History:     Social History     Socioeconomic History   • Marital status: /Civil Union     Spouse name: None   • Number of children: None   • Years of education: None   • Highest education level: None   Occupational History   • None   Tobacco Use   • Smoking status: Former     Years: 15 00     Types: Cigarettes     Quit date: 2007     Years since quittin 0     Passive exposure: Never   • Smokeless tobacco: Never   Vaping Use   • Vaping Use: Never used   Substance and Sexual Activity   • Alcohol use: Yes     Comment: socially   • Drug use: Never   • Sexual activity: Yes     Partners: Female   Other Topics Concern   • None   Social History Narrative   • None     Social Determinants of Health     Financial Resource Strain: Not on file   Food Insecurity: Not on file   Transportation Needs: Not on file   Physical Activity: Not on file   Stress: Not on file   Social Connections: Not on file   Intimate Partner Violence: Not on file   Housing Stability: Not on file      Current Medications:     Current Outpatient Medications   Medication Sig Dispense Refill   • atorvastatin (LIPITOR) 10 mg tablet Take 1 tablet (10 mg total) by mouth daily at bedtime 90 tablet 3   • Cholecalciferol (VITAMIN D) 2000 units CAPS Take by mouth     • glucosamine-chondroitin 500-400 MG tablet Take 1 tablet by mouth every morning     • Multiple Vitamin (MULTIVITAMIN) tablet Take 1 tablet by mouth daily     • naproxen (NAPROSYN) 500 mg tablet Take 1 tablet (500 mg total) by mouth 2 (two) times a day with meals 60 tablet 0   • Omega-3 Fatty Acids (FISH OIL) 1,000 mg Take 1,000 mg by mouth daily     • propranolol (INDERAL) 20 mg tablet Take 1 tablet (20 mg total) by mouth daily as needed (anxiety, panic attack) 90 tablet 0   • tretinoin (RETIN-A) 0 025 % cream APPLY EVERY NIGHT AT BEDTIME       No current facility-administered medications for this visit  Allergies:     No Known Allergies   Physical Exam:     /80 (BP Location: Left arm, Patient Position: Sitting, Cuff Size: Large)   Pulse 84   Temp (!) 97 2 °F (36 2 °C) (Tympanic)   Resp 18   Ht 5' 7 48" (1 714 m)   Wt 94 2 kg (207 lb 9 6 oz)   BMI 32 05 kg/m²     Physical Exam  Vitals reviewed  Constitutional:       Appearance: Normal appearance  HENT:      Head: Normocephalic and atraumatic  Eyes:      General:         Right eye: No discharge  Left eye: No discharge  Conjunctiva/sclera: Conjunctivae normal    Neck:      Vascular: No carotid bruit  Cardiovascular:      Rate and Rhythm: Normal rate and regular rhythm  Heart sounds: Normal heart sounds  No murmur heard  No friction rub  No gallop  Pulmonary:      Effort: Pulmonary effort is normal  No respiratory distress  Breath sounds: Normal breath sounds  No wheezing or rales  Abdominal:      General: Bowel sounds are normal  There is no distension  Palpations: Abdomen is soft  Tenderness: There is no abdominal tenderness     Musculoskeletal: General: No swelling, tenderness or deformity  Normal range of motion  Cervical back: Normal range of motion and neck supple  No muscular tenderness  Lymphadenopathy:      Cervical: No cervical adenopathy  Neurological:      Mental Status: He is alert     Psychiatric:         Mood and Affect: Mood normal           Anthony Damon MD  [address] [place] [address]

## 2023-02-22 NOTE — PROGRESS NOTES
Name: Brittany Ivey      : 1970      MRN: [id number]  Encounter Provider: Nacho Velasco MD  Encounter Date: 2023   Encounter department: [address] [place] [address]     1  Mixed hyperlipidemia  Assessment & Plan:  2023 Lipid improved  Low fat diet  Continue atorvastatin 10mg qhs        2  IFG (impaired fasting glucose)  Assessment & Plan: Today HgA1C 5 5 good  Low carb diet  Orders:  -     POCT hemoglobin A1c    3  Depression with anxiety  Assessment & Plan:  Controlled  Not on medications now  4  Obesity (BMI 30-39 9)    5  Screen for colon cancer  -     Ambulatory Referral to Colorectal Surgery; Future    6  Annual physical exam        Reviewed lab in 2023  CBC normal  Lipid 179/154/48/100 improved  BMP showed glucose 130 elevated    Got flu shot yearly  Got Covid19 vaccines and booster  Got Tdap 2016    Will check insurance for coverage of shingrix  RTO in 6 months        Subjective      HPI     Pt is here by himself       Hyperlipidemia---Pt tried to follow low fat diet  He is on atorvastatin 10mg qhs  Denies side effects    Father had MI at age of 70      IFG---Tried to follow low carb diet  Very active  Exercise/gym 1 hour/day    Denies Fhx of DM        Depression/anxiety----Feels fine now  He is off wellbutrin  He uses propranolol 20mg as needed  Does not need it for a while        No smoking    Alcohol 2-3 drinks/day  No drugs        Review of Systems   Constitutional: Negative for appetite change, chills and fever  HENT: Negative for congestion, ear pain, sinus pain and sore throat  Eyes: Negative for discharge and itching  Respiratory: Negative for apnea, cough, chest tightness, shortness of breath and wheezing  Cardiovascular: Negative for chest pain, palpitations and leg swelling  Gastrointestinal: Negative for abdominal pain, anal bleeding, constipation, diarrhea, nausea and vomiting     Endocrine: Negative for cold intolerance, heat intolerance and polyuria  Genitourinary: Negative for difficulty urinating and dysuria  Musculoskeletal: Negative for arthralgias, back pain and myalgias  Skin: Negative for rash  Neurological: Negative for dizziness and headaches  Psychiatric/Behavioral: Negative for agitation  Current Outpatient Medications on File Prior to Visit   Medication Sig   • atorvastatin (LIPITOR) 10 mg tablet Take 1 tablet (10 mg total) by mouth daily at bedtime   • Cholecalciferol (VITAMIN D) 2000 units CAPS Take by mouth   • glucosamine-chondroitin 500-400 MG tablet Take 1 tablet by mouth every morning   • Multiple Vitamin (MULTIVITAMIN) tablet Take 1 tablet by mouth daily   • naproxen (NAPROSYN) 500 mg tablet Take 1 tablet (500 mg total) by mouth 2 (two) times a day with meals   • Omega-3 Fatty Acids (FISH OIL) 1,000 mg Take 1,000 mg by mouth daily   • propranolol (INDERAL) 20 mg tablet Take 1 tablet (20 mg total) by mouth daily as needed (anxiety, panic attack)   • tretinoin (RETIN-A) 0 025 % cream APPLY EVERY NIGHT AT BEDTIME   • [DISCONTINUED] buPROPion (WELLBUTRIN XL) 300 mg 24 hr tablet TAKE 1 TABLET DAILY       Objective     /80 (BP Location: Left arm, Patient Position: Sitting, Cuff Size: Large)   Pulse 84   Temp (!) 97 2 °F (36 2 °C) (Tympanic)   Resp 18   Ht 5' 7 48" (1 714 m)   Wt 94 2 kg (207 lb 9 6 oz)   BMI 32 05 kg/m²     Physical Exam  Constitutional:       Appearance: He is well-developed  HENT:      Head: Normocephalic and atraumatic  Eyes:      General:         Right eye: No discharge  Left eye: No discharge  Conjunctiva/sclera: Conjunctivae normal    Cardiovascular:      Rate and Rhythm: Normal rate and regular rhythm  Heart sounds: Normal heart sounds  No murmur heard  No friction rub  No gallop  Pulmonary:      Effort: Pulmonary effort is normal  No respiratory distress  Breath sounds: Normal breath sounds  No wheezing or rales     Abdominal: General: Bowel sounds are normal  There is no distension  Palpations: Abdomen is soft  Tenderness: There is no abdominal tenderness  There is no guarding  Musculoskeletal:         General: Normal range of motion  Cervical back: Normal range of motion and neck supple  No tenderness  Right lower leg: No edema  Left lower leg: No edema  Lymphadenopathy:      Cervical: No cervical adenopathy  Neurological:      Mental Status: He is alert         Royer Felder MD

## 2023-04-03 DIAGNOSIS — E78.2 MIXED HYPERLIPIDEMIA: ICD-10-CM

## 2023-04-03 RX ORDER — ATORVASTATIN CALCIUM 10 MG/1
10 TABLET, FILM COATED ORAL
Qty: 90 TABLET | Refills: 3 | Status: SHIPPED | OUTPATIENT
Start: 2023-04-03

## 2023-06-28 ENCOUNTER — OFFICE VISIT (OUTPATIENT)
Dept: OBGYN CLINIC | Facility: CLINIC | Age: 53
End: 2023-06-28
Payer: COMMERCIAL

## 2023-06-28 VITALS
HEIGHT: 67 IN | WEIGHT: 207 LBS | DIASTOLIC BLOOD PRESSURE: 60 MMHG | BODY MASS INDEX: 32.49 KG/M2 | SYSTOLIC BLOOD PRESSURE: 119 MMHG

## 2023-06-28 DIAGNOSIS — M77.8 EXTENSOR CARPI ULNARIS TENDINITIS: Primary | ICD-10-CM

## 2023-06-28 PROCEDURE — 99213 OFFICE O/P EST LOW 20 MIN: CPT | Performed by: PHYSICIAN ASSISTANT

## 2023-06-28 PROCEDURE — 20550 NJX 1 TENDON SHEATH/LIGAMENT: CPT | Performed by: PHYSICIAN ASSISTANT

## 2023-06-28 RX ORDER — LIDOCAINE HYDROCHLORIDE 10 MG/ML
3 INJECTION, SOLUTION INFILTRATION; PERINEURAL
Status: COMPLETED | OUTPATIENT
Start: 2023-06-28 | End: 2023-06-28

## 2023-06-28 RX ORDER — BETAMETHASONE SODIUM PHOSPHATE AND BETAMETHASONE ACETATE 3; 3 MG/ML; MG/ML
6 INJECTION, SUSPENSION INTRA-ARTICULAR; INTRALESIONAL; INTRAMUSCULAR; SOFT TISSUE
Status: COMPLETED | OUTPATIENT
Start: 2023-06-28 | End: 2023-06-28

## 2023-06-28 RX ORDER — METHYLPREDNISOLONE 4 MG/1
TABLET ORAL
COMMUNITY
Start: 2023-03-24

## 2023-06-28 RX ADMIN — LIDOCAINE HYDROCHLORIDE 3 ML: 10 INJECTION, SOLUTION INFILTRATION; PERINEURAL at 10:30

## 2023-06-28 RX ADMIN — BETAMETHASONE SODIUM PHOSPHATE AND BETAMETHASONE ACETATE 6 MG: 3; 3 INJECTION, SUSPENSION INTRA-ARTICULAR; INTRALESIONAL; INTRAMUSCULAR; SOFT TISSUE at 10:30

## 2023-06-28 NOTE — PROGRESS NOTES
Assessment:    Right TFCC tear  Right ECU tendonitis      Plan:    Steroid injection provided to the right ECU tendon today and the patient tolerated well  Activities as tolerated, no specific restrictions  NSAIDs as needed  Follow-up on an as needed basis, we did briefly discuss 6th dorsal extensor compartment release in the future  Subjective:     HPI    Patient ID:  Krystian Israel is a left hand dominant 50 y o  male presenting for evaluation of the right wrist   According to the patient and chart review, he has a longstanding history of chronic right ulnar wrist pain that has been diagnosed with TFCC tear as well as ECU tendinitis in the past   He has been treated by Dr Champ Essex and Dr Freya Dean with conservative management, and injection  States it did help for a period of time  Over the last several months he has noticed an increase of pain without acute trauma  Pain is well localized to the ulnar wrist that is described as sometimes sharp, and dull/achy  Activities like exercising and overhead lifting make the problem significantly worse to the point where he can no longer do this  He has tried medications creams and bracing with no relief  No associated numbness and tingling  No history of surgery to the right wrist       The following portions of the patient's history were reviewed and updated as appropriate: allergies, current medications, past family history, past medical history, past social history, past surgical history, and problem list     Review of Systems     Objective:    Imaging:  MRI arthrogram Right wrist 8/25/2021  IMPRESSION:     1   Peripheral tear of the TFCC      2   Mild ECU tendinosis without tear      3   Small ganglion cyst measuring 1 1 x 0 9 x 0 7 cm in the volar and radial aspect of the wrist adjacent to the FCR and radial artery      4  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        Physical Exam     Vitals: 06/28/23 1028   BP: 119/60       General appearance:  NAD   Cardiac:  Regular rate  Lungs:  Unlabored breathing  Abdomen:  Non-distended    Orthopedic Examination:  Right wrist     Inspection:  No open wounds or erythema  No ecchymosis or swelling    Palpation:  + TTP ECU tendon    Range-of-motion:  Normal wrist ROM, full composite fist   + Pain with wrist extension  Strength:  5/5 wrist flexion/extension,     Sensation:  ILT m/r/u nerve distribution    Special Tests:  Palpable radial pulse   No instability of the wrist with stress    Hand/upper extremity injection: R wrist  Universal Protocol:  Consent: Verbal consent obtained    Risks and benefits: risks, benefits and alternatives were discussed  Consent given by: patient    Supporting Documentation  Indications: pain and tendon swelling   Procedure Details  Condition:tendonitis Condition comment: R ECU tendonitis    Site: R wrist   Preparation: Patient was prepped and draped in the usual sterile fashion  Needle size: 22 G  Approach: ulnar  Medications administered: 3 mL lidocaine 1 %; 6 mg betamethasone acetate-betamethasone sodium phosphate 6 (3-3) mg/mL    Patient tolerance: patient tolerated the procedure well with no immediate complications  Dressing:  Sterile dressing applied

## 2023-09-19 DIAGNOSIS — L65.9 HAIR LOSS: ICD-10-CM

## 2023-09-19 RX ORDER — FINASTERIDE 1 MG/1
1 TABLET, FILM COATED ORAL DAILY
Qty: 90 TABLET | Refills: 1 | OUTPATIENT
Start: 2023-09-19

## 2023-09-21 ENCOUNTER — TELEPHONE (OUTPATIENT)
Dept: FAMILY MEDICINE CLINIC | Facility: CLINIC | Age: 53
End: 2023-09-21

## 2023-09-21 NOTE — TELEPHONE ENCOUNTER
LMOM: This is [employer]. Please give me a call back [phone]. I had requested a prescription that was denied. It was one that was in place previously trying to get that called in, but obviously you need to speak to somebody first since it was denied without any communication as to why. So appreciate a call back as soon as possible. Thank you. - Please advise, request for refill was denied. Reuqest call with explanation if script can not be refilled. Please note if appointment is needed. I will contact patient to schedule.

## 2023-09-22 ENCOUNTER — TELEMEDICINE (OUTPATIENT)
Dept: FAMILY MEDICINE CLINIC | Facility: CLINIC | Age: 53
End: 2023-09-22
Payer: COMMERCIAL

## 2023-09-22 DIAGNOSIS — L64.9 MALE PATTERN BALDNESS: Primary | ICD-10-CM

## 2023-09-22 PROCEDURE — 99213 OFFICE O/P EST LOW 20 MIN: CPT | Performed by: FAMILY MEDICINE

## 2023-09-22 RX ORDER — MELOXICAM 15 MG/1
15 TABLET ORAL DAILY PRN
COMMUNITY
Start: 2023-09-07

## 2023-09-22 RX ORDER — FINASTERIDE 1 MG/1
1 TABLET, FILM COATED ORAL DAILY
Qty: 90 TABLET | Refills: 3 | Status: SHIPPED | OUTPATIENT
Start: 2023-09-22

## 2023-09-22 NOTE — PROGRESS NOTES
Chief Complaint   Patient presents with   • Virtual Brief Visit   • Virtual Regular Visit     Health Maintenance   Topic Date Due   • HIV Screening  Never done   • Colorectal Cancer Screening  Never done   • COVID-19 Vaccine (4 - Pfizer series) 01/17/2022   • Influenza Vaccine (1) 09/01/2023   • BMI: Followup Plan  02/22/2024   • Hepatitis C Screening  12/21/2023 (Originally 1970)   • Annual Physical  02/22/2024   • BMI: Adult  06/28/2024   • DTaP,Tdap,and Td Vaccines (2 - Td or Tdap) 10/29/2026   • Pneumococcal Vaccine: Pediatrics (0 to 5 Years) and At-Risk Patients (6 to 59 Years)  Aged Out   • HIB Vaccine  Aged Out   • IPV Vaccine  Aged Out   • Hepatitis A Vaccine  Aged Out   • Meningococcal ACWY Vaccine  Aged Out   • HPV Vaccine  Aged Out       Virtual Regular Visit    Verification of patient location:    Patient is located at Home in the following state in which I hold an active license [place]      Assessment/Plan:    Problem List Items Addressed This Visit        Endocrine    Male pattern baldness - Primary     Restart finasteride 1mg QD. SE educated pt. Relevant Medications    finasteride (PROPECIA) 1 MG tablet            Reason for visit is   Chief Complaint   Patient presents with   • Virtual Brief Visit   • Virtual Regular Visit        Encounter provider Arlet Cast MD    Provider located at [address] [place] [id number]      Recent Visits  Date Type Provider Dept   09/21/23 Telephone [address] recent visits within past 7 days and meeting all other requirements  Today's Visits  Date Type Provider Dept   09/22/23 Silva, [address] [facility] today's visits and meeting all other requirements  Future Appointments  No visits were found meeting these conditions.   Showing future appointments within next 150 days and meeting all other requirements       The patient was identified by name and date of birth. Isreal Mckeon was informed that this is a telemedicine visit and that the visit is being conducted through the [address] [place] [address] 22 Now platform. He agrees to proceed. .  My office door was closed. No one else was in the room. He acknowledged consent and understanding of privacy and security of the video platform. The patient has agreed to participate and understands they can discontinue the visit at any time. Patient is aware this is a billable service. Subjective  Isreal Mckeon is a 50 y.o. male for baldness . HPI     C/o loss hair for a while. He was on finasteride years ago which helped. Pt states he can tolerate, no side effects. He stopped for a while and lost hair again. Would like to restart. Treid Topical Rogaine but no help. Feels ok now. Denies depression.        Past Medical History:   Diagnosis Date   • Anxiety     "general work related"   • Chronic pain disorder    • Dislocated shoulder     originally approx 25 yrs ago//left   • Exercises 3 to 4 times per week     4-5x/week   • Hyperlipidemia     borderline   • Impaired fasting glucose    • Joint pain    • Labral tear of shoulder     left with occas pain   • Olecranon bursitis, left elbow     had surgery   • Osteoarthritis    • Shoulder arthritis     left   • Vitamin D deficiency     pt not sure   • Wears glasses     reading       Past Surgical History:   Procedure Laterality Date   • ARTHROSCOPIC BICEPS TENODESIS Left 2/8/2018    Procedure: SHOULDER ARTHROSCOPY WITH BICEPS TENODESIS;  Surgeon: Briseyda Clarke MD;  Location: [facility];  Service: Orthopedics   • FL INJECTION LEFT SHOULDER (ARTHROGRAM)  1/8/2020   • FL INJECTION RIGHT WRIST (ARTHROGRAM)  8/25/2021   • ORTHOPEDIC SURGERY     • NM EXCISION OLECRANON BURSA Left 10/3/2019    Procedure: EXCISION BURSA OLECRANON ELBOW;  Surgeon: Pk Ingram MD;  Location: [address] [place] [address];  Service: Orthopedics   • NM SURGICAL ARTHROSCOPY SHOULDER REPAIR SLAP LESION Left 2/10/2020    Procedure: ARTHROSCOPY SHOULDER, posterior labral repair;  Surgeon: Abelino Stephenson MD;  Location: [facility];  Service: Orthopedics   • ROTATOR CUFF REPAIR     • ROTATOR CUFF REPAIR Left     x2 last one 2005   • SEPTOPLASTY     • WISDOM TOOTH EXTRACTION         Current Outpatient Medications   Medication Sig Dispense Refill   • atorvastatin (LIPITOR) 10 mg tablet Take 1 tablet (10 mg total) by mouth daily at bedtime 90 tablet 3   • Cholecalciferol (VITAMIN D) 2000 units CAPS Take by mouth     • finasteride (PROPECIA) 1 MG tablet Take 1 tablet (1 mg total) by mouth daily 90 tablet 3   • glucosamine-chondroitin 500-400 MG tablet Take 1 tablet by mouth every morning     • meloxicam (MOBIC) 15 mg tablet Take 15 mg by mouth daily as needed     • Multiple Vitamin (MULTIVITAMIN) tablet Take 1 tablet by mouth daily     • Omega-3 Fatty Acids (FISH OIL) 1,000 mg Take 1,000 mg by mouth daily     • tretinoin (RETIN-A) 0.025 % cream APPLY EVERY NIGHT AT BEDTIME     • propranolol (INDERAL) 20 mg tablet Take 1 tablet (20 mg total) by mouth daily as needed (anxiety, panic attack) 90 tablet 0     No current facility-administered medications for this visit. No Known Allergies    Review of Systems   Constitutional: Negative for appetite change, chills and fever. HENT: Negative for congestion, ear pain, sinus pain and sore throat. Eyes: Negative for discharge and itching. Respiratory: Negative for apnea, cough, chest tightness, shortness of breath and wheezing. Cardiovascular: Negative for chest pain, palpitations and leg swelling. Gastrointestinal: Negative for abdominal pain, anal bleeding, constipation, diarrhea, nausea and vomiting. Endocrine: Negative for cold intolerance, heat intolerance and polyuria. Genitourinary: Negative for difficulty urinating and dysuria. Musculoskeletal: Negative for arthralgias, back pain and myalgias. Skin: Negative for rash.    Neurological: Negative for dizziness and headaches. Psychiatric/Behavioral: Negative for agitation. Video Exam    There were no vitals filed for this visit. Physical Exam  Constitutional:       Appearance: Normal appearance. Eyes:      General:         Right eye: No discharge. Left eye: No discharge. Conjunctiva/sclera: Conjunctivae normal.   Musculoskeletal:         General: Normal range of motion. Cervical back: Normal range of motion. Skin:     Comments: Male pattern baldness   Neurological:      Mental Status: He is alert.           Visit Time  Total Visit Duration: 15 min

## 2023-09-26 ENCOUNTER — TELEPHONE (OUTPATIENT)
Dept: FAMILY MEDICINE CLINIC | Facility: CLINIC | Age: 53
End: 2023-09-26

## 2023-09-26 NOTE — TELEPHONE ENCOUNTER
LMOM: Good afternoon. Doctor Sabine Noguera had called in a script for me but the pharmacy it's telling me that the my insurance company needs more information. The someone from your office needs to call them. Please give me a call let me know what whatever is needed or if that call has been made yet. This script was called in on Friday and I don't have any updates yet. So phone number [phone]. The description was for finasteride. Thank you. - Prior Auth may be needed for finasteride (propecia) 1 mg tablet.

## 2024-02-27 ENCOUNTER — OFFICE VISIT (OUTPATIENT)
Dept: FAMILY MEDICINE CLINIC | Facility: CLINIC | Age: 54
End: 2024-02-27
Payer: COMMERCIAL

## 2024-02-27 VITALS
BODY MASS INDEX: 29.06 KG/M2 | DIASTOLIC BLOOD PRESSURE: 60 MMHG | RESPIRATION RATE: 16 BRPM | HEART RATE: 64 BPM | TEMPERATURE: 98.2 F | WEIGHT: 203 LBS | HEIGHT: 70 IN | OXYGEN SATURATION: 99 % | SYSTOLIC BLOOD PRESSURE: 110 MMHG

## 2024-02-27 DIAGNOSIS — E78.2 MIXED HYPERLIPIDEMIA: ICD-10-CM

## 2024-02-27 DIAGNOSIS — R09.89 THROAT CLEARING: Primary | ICD-10-CM

## 2024-02-27 DIAGNOSIS — L64.9 MALE PATTERN BALDNESS: ICD-10-CM

## 2024-02-27 DIAGNOSIS — R73.01 IFG (IMPAIRED FASTING GLUCOSE): ICD-10-CM

## 2024-02-27 DIAGNOSIS — E55.9 VITAMIN D DEFICIENCY: ICD-10-CM

## 2024-02-27 DIAGNOSIS — Z12.11 SCREENING FOR COLORECTAL CANCER: ICD-10-CM

## 2024-02-27 DIAGNOSIS — Z12.5 SCREENING FOR PROSTATE CANCER: ICD-10-CM

## 2024-02-27 DIAGNOSIS — E66.3 OVERWEIGHT (BMI 25.0-29.9): ICD-10-CM

## 2024-02-27 DIAGNOSIS — F41.8 DEPRESSION WITH ANXIETY: ICD-10-CM

## 2024-02-27 DIAGNOSIS — Z00.00 ANNUAL PHYSICAL EXAM: ICD-10-CM

## 2024-02-27 DIAGNOSIS — Z12.12 SCREENING FOR COLORECTAL CANCER: ICD-10-CM

## 2024-02-27 PROBLEM — Z78.9 ALCOHOL USE: Status: RESOLVED | Noted: 2023-02-22 | Resolved: 2024-02-27

## 2024-02-27 PROBLEM — E66.9 OBESITY (BMI 30-39.9): Status: RESOLVED | Noted: 2018-12-10 | Resolved: 2024-02-27

## 2024-02-27 PROCEDURE — 99214 OFFICE O/P EST MOD 30 MIN: CPT | Performed by: FAMILY MEDICINE

## 2024-02-27 PROCEDURE — 99396 PREV VISIT EST AGE 40-64: CPT | Performed by: FAMILY MEDICINE

## 2024-02-27 NOTE — PROGRESS NOTES
ADULT ANNUAL PHYSICAL  [facility]    NAME: Mike Guerra  AGE: 53 y.o. SEX: male  : 1970     DATE: 2024     Assessment and Plan:     Problem List Items Addressed This Visit          Endocrine    IFG (impaired fasting glucose)     Low carb diet. Check labs.          Relevant Orders    CBC and differential    Comprehensive metabolic panel    Hemoglobin A1C    Lipid Panel with Direct LDL reflex    PSA, Total Screen    Testosterone, free, total    Vitamin D 25 hydroxy    Male pattern baldness     Continue finasteride 1mg Qd.          Relevant Orders    CBC and differential    Comprehensive metabolic panel    Hemoglobin A1C    Lipid Panel with Direct LDL reflex    PSA, Total Screen    Testosterone, free, total    Vitamin D 25 hydroxy       Other    Mixed hyperlipidemia     Low fat diet. Continue atorvastatin 10mg qhs. Check labs.          Relevant Orders    CBC and differential    Comprehensive metabolic panel    Hemoglobin A1C    Lipid Panel with Direct LDL reflex    PSA, Total Screen    Testosterone, free, total    Vitamin D 25 hydroxy    Throat clearing - Primary     May try OTC prilosec, flonase. If no improving, see ENT.          Relevant Orders    Ambulatory Referral to Otolaryngology    Overweight (BMI 25.0-29.9)    RESOLVED: Depression with anxiety    Relevant Orders    CBC and differential    Comprehensive metabolic panel    Hemoglobin A1C    Lipid Panel with Direct LDL reflex    PSA, Total Screen    Testosterone, free, total    Vitamin D 25 hydroxy     Other Visit Diagnoses       Screening for colorectal cancer        Relevant Orders    Ambulatory referral to Colorectal Surgery    Screening for prostate cancer        Relevant Orders    CBC and differential    Comprehensive metabolic panel    Hemoglobin A1C    Lipid Panel with Direct LDL reflex    PSA, Total Screen    Testosterone, free, total    Vitamin D 25 hydroxy    Vitamin D  deficiency        Relevant Orders    CBC and differential    Comprehensive metabolic panel    Hemoglobin A1C    Lipid Panel with Direct LDL reflex    PSA, Total Screen    Testosterone, free, total    Vitamin D 25 hydroxy    Annual physical exam        Relevant Orders    CBC and differential    Comprehensive metabolic panel    Hemoglobin A1C    Lipid Panel with Direct LDL reflex    PSA, Total Screen    Testosterone, free, total    Vitamin D 25 hydroxy            Got flu shot yearly.   Got Covid19 vaccines and booster.   Got Tdap 2016.   Will check insurance for coverage of shingrix.   Due for Colonoscopy.   RTO in 6 months.       Immunizations and preventive care screenings were discussed with patient today. Appropriate education was printed on patient's after visit summary.    Discussed risks and benefits of prostate cancer screening. We discussed the controversial history of PSA screening for prostate cancer in the [place] as well as the risk of over detection and over treatment of prostate cancer by way of PSA screening.  The patient understands that PSA blood testing is an imperfect way to screen for prostate cancer and that elevated PSA levels in the blood may also be caused by infection, inflammation, prostatic trauma or manipulation, urological procedures, or by benign prostatic enlargement.    The role of the digital rectal examination in prostate cancer screening was also discussed and I discussed with him that there is large interobserver variability in the findings of digital rectal examination.    Counseling:  Alcohol/drug use: discussed moderation in alcohol intake, the recommendations for healthy alcohol use, and avoidance of illicit drug use.  Dental Health: discussed importance of regular tooth brushing, flossing, and dental visits.  Injury prevention: discussed safety/seat belts, safety helmets, smoke detectors, carbon dioxide detectors, and smoking near bedding or upholstery.  Sexual health:  discussed sexually transmitted diseases, partner selection, use of condoms, avoidance of unintended pregnancy, and contraceptive alternatives.  Exercise: the importance of regular exercise/physical activity was discussed. Recommend exercise 3-5 times per week for at least 30 minutes.          Return in about 6 months (around 2024) for Recheck.     Chief Complaint:     Chief Complaint   Patient presents with    Physical Exam    GERD     Throat Clearing denies belching     Nasal Congestion     Post Nasal Dript    Care Gap Colonoscopy     Discuss options for CRS      History of Present Illness:     Adult Annual Physical   Patient here for a comprehensive physical exam. The patient reports problems - see note .    Pt is here by himself.      Throat clear/postnasal drip for a while.   Will try flonase and omeprazole.   Would like to see ENT.     Hyperlipidemia---Pt tried to follow low fat diet. He is on atorvastatin 10mg qhs. Denies side effects.   Father had MI at age of 70.      IFG---Tried to follow low carb diet. Very active. Exercise/gym 1 hour/day.   Denies Fhx of DM.     Hair loss---He is on finasteride 1mg daily.       Depression/anxiety----Feels fine now.   He uses propranolol 20mg rarely.     No smoking.   Alcohol 1 drink/day.   No drugs.     Diet and Physical Activity  Diet/Nutrition: well balanced diet.   Exercise: moderate cardiovascular exercise, 5-7 times a week on average, and 30-60 minutes on average.      Depression Screening  PHQ-2/9 Depression Screening    Little interest or pleasure in doing things: 0 - not at all  Feeling down, depressed, or hopeless: 0 - not at all  Trouble falling or staying asleep, or sleeping too much: 0 - not at all  Feeling tired or having little energy: 0 - not at all  Poor appetite or overeatin - not at all  Feeling bad about yourself - or that you are a failure or have let yourself or your family down: 0 - not at all  Trouble concentrating on things, such as  "reading the newspaper or watching television: 0 - not at all  Moving or speaking so slowly that other people could have noticed. Or the opposite - being so fidgety or restless that you have been moving around a lot more than usual: 0 - not at all  Thoughts that you would be better off dead, or of hurting yourself in some way: 0 - not at all  PHQ-9 Score: 0  PHQ-9 Interpretation: No or Minimal depression       General Health  Sleep: sleeps well.   Hearing: normal - bilateral.  Vision: previous LASIK surgery.   Dental: regular dental visits.        Health  Symptoms include: none       Review of Systems:     Review of Systems   Constitutional:  Negative for appetite change, chills and fever.   HENT:  Negative for congestion, ear pain, sinus pain and sore throat.    Eyes:  Negative for discharge and itching.   Respiratory:  Negative for apnea, cough, chest tightness, shortness of breath and wheezing.    Cardiovascular:  Negative for chest pain, palpitations and leg swelling.   Gastrointestinal:  Negative for abdominal pain, anal bleeding, constipation, diarrhea, nausea and vomiting.   Endocrine: Negative for cold intolerance, heat intolerance and polyuria.   Genitourinary:  Negative for difficulty urinating and dysuria.   Musculoskeletal:  Negative for arthralgias, back pain and myalgias.   Skin:  Negative for rash.   Neurological:  Negative for dizziness and headaches.   Psychiatric/Behavioral:  Negative for agitation.       Past Medical History:     Past Medical History:   Diagnosis Date    Anxiety     \"general work related\"    Chronic pain disorder     Dislocated shoulder     originally approx 25 yrs ago//left    Exercises 3 to 4 times per week     4-5x/week    Hyperlipidemia     borderline    Impaired fasting glucose     Joint pain     Labral tear of shoulder     left with occas pain    Olecranon bursitis, left elbow     had surgery    Osteoarthritis     Shoulder arthritis     left    Vitamin D deficiency     pt " not sure    Wears glasses     reading      Past Surgical History:     Past Surgical History:   Procedure Laterality Date    ARTHROSCOPIC BICEPS TENODESIS Left 2018    Procedure: SHOULDER ARTHROSCOPY WITH BICEPS TENODESIS;  Surgeon: Sj Pierce MD;  Location: [facility];  Service: Orthopedics    FL INJECTION LEFT SHOULDER (ARTHROGRAM)  2020    FL INJECTION RIGHT WRIST (ARTHROGRAM)  2021    ORTHOPEDIC SURGERY      AZ EXCISION OLECRANON BURSA Left 10/3/2019    Procedure: EXCISION BURSA OLECRANON ELBOW;  Surgeon: Anthony Dao MD;  Location:  MAIN OR;  Service: Orthopedics    AZ SURGICAL ARTHROSCOPY SHOULDER REPAIR SLAP LESION Left 2/10/2020    Procedure: ARTHROSCOPY SHOULDER, posterior labral repair;  Surgeon: Ruben Ferraro MD;  Location: [facility];  Service: Orthopedics    ROTATOR CUFF REPAIR      ROTATOR CUFF REPAIR Left     x2 last one     SEPTOPLASTY      WISDOM TOOTH EXTRACTION        Family History:     Family History   Problem Relation Age of Onset    No Known Problems Mother     Heart attack Father 70      Social History:     Social History     Socioeconomic History    Marital status: /Civil Union     Spouse name: None    Number of children: None    Years of education: None    Highest education level: None   Occupational History    None   Tobacco Use    Smoking status: Former     Current packs/day: 0.00     Types: Cigarettes     Start date: 1992     Quit date: 2007     Years since quittin.0     Passive exposure: Never    Smokeless tobacco: Never   Vaping Use    Vaping status: Never Used   Substance and Sexual Activity    Alcohol use: Yes     Comment: socially    Drug use: No    Sexual activity: Yes     Partners: Female     Birth control/protection: Male Sterilization   Other Topics Concern    None   Social History Narrative    None     Social Determinants of Health     Financial Resource Strain: Not on file   Food Insecurity: Not on file   Transportation Needs:  "Not on file   Physical Activity: Not on file   Stress: Not on file   Social Connections: Not on file   Intimate Partner Violence: Not on file   Housing Stability: Not on file      Current Medications:     Current Outpatient Medications   Medication Sig Dispense Refill    atorvastatin (LIPITOR) 10 mg tablet Take 1 tablet (10 mg total) by mouth daily at bedtime 90 tablet 3    Cholecalciferol (VITAMIN D) 2000 units CAPS Take by mouth      finasteride (PROPECIA) 1 MG tablet Take 1 tablet (1 mg total) by mouth daily 90 tablet 3    glucosamine-chondroitin 500-400 MG tablet Take 1 tablet by mouth every morning      meloxicam (MOBIC) 15 mg tablet Take 15 mg by mouth daily as needed      Multiple Vitamin (MULTIVITAMIN) tablet Take 1 tablet by mouth daily      Omega-3 Fatty Acids (FISH OIL) 1,000 mg Take 1,000 mg by mouth daily      propranolol (INDERAL) 20 mg tablet Take 1 tablet (20 mg total) by mouth daily as needed (anxiety, panic attack) 90 tablet 0    tretinoin (RETIN-A) 0.025 % cream APPLY EVERY NIGHT AT BEDTIME       No current facility-administered medications for this visit.      Allergies:     No Known Allergies   Physical Exam:     /60 (BP Location: Left arm, Patient Position: Sitting, Cuff Size: Large)   Pulse 64   Temp 98.2 °F (36.8 °C) (Temporal)   Resp 16   Ht 5' 10\" (1.778 m)   Wt 92.1 kg (203 lb)   SpO2 99%   BMI 29.13 kg/m²     Physical Exam  Vitals reviewed.   Constitutional:       Appearance: Normal appearance.   HENT:      Head: Normocephalic and atraumatic.      Right Ear: Tympanic membrane normal.      Left Ear: Tympanic membrane normal.      Mouth/Throat:      Pharynx: No oropharyngeal exudate or posterior oropharyngeal erythema.   Eyes:      General:         Right eye: No discharge.         Left eye: No discharge.      Conjunctiva/sclera: Conjunctivae normal.   Neck:      Vascular: No carotid bruit.   Cardiovascular:      Rate and Rhythm: Normal rate and regular rhythm.      Heart " sounds: Normal heart sounds. No murmur heard.     No friction rub. No gallop.   Pulmonary:      Effort: Pulmonary effort is normal. No respiratory distress.      Breath sounds: Normal breath sounds. No wheezing or rales.   Abdominal:      General: Bowel sounds are normal. There is no distension.      Palpations: Abdomen is soft.      Tenderness: There is no abdominal tenderness.   Musculoskeletal:         General: No swelling, tenderness or deformity. Normal range of motion.      Cervical back: Normal range of motion and neck supple. No muscular tenderness.   Lymphadenopathy:      Cervical: No cervical adenopathy.   Neurological:      Mental Status: He is alert.   Psychiatric:         Mood and Affect: Mood normal.          Franky Dove MD  [facility]

## 2024-03-13 DIAGNOSIS — E78.2 MIXED HYPERLIPIDEMIA: ICD-10-CM

## 2024-03-13 RX ORDER — ATORVASTATIN CALCIUM 10 MG/1
10 TABLET, FILM COATED ORAL
Qty: 90 TABLET | Refills: 3 | Status: SHIPPED | OUTPATIENT
Start: 2024-03-13

## 2024-03-14 ENCOUNTER — PREP FOR PROCEDURE (OUTPATIENT)
Age: 54
End: 2024-03-14

## 2024-03-14 ENCOUNTER — TELEPHONE (OUTPATIENT)
Age: 54
End: 2024-03-14

## 2024-03-14 DIAGNOSIS — Z12.11 SCREENING FOR COLON CANCER: Primary | ICD-10-CM

## 2024-03-14 NOTE — TELEPHONE ENCOUNTER
5'10 203 BMI 29.13    Scheduled date of colonoscopy (as of today):5/15/24    Physician performing colonoscopy:Dr Patel    Location of colonoscopy:[facility]    Bowel prep reviewed with patient:miralax/dulcolax     Instructions reviewed with patient by:prep instructions sent via email on pt's chart    Clearances: N/A

## 2024-04-16 ENCOUNTER — HOSPITAL ENCOUNTER (OUTPATIENT)
Dept: CT IMAGING | Facility: HOSPITAL | Age: 54
Discharge: HOME | End: 2024-04-16
Payer: COMMERCIAL

## 2024-04-16 DIAGNOSIS — J34.89 RHINORRHEA: ICD-10-CM

## 2024-04-16 DIAGNOSIS — J34.89 SINUS PRESSURE: ICD-10-CM

## 2024-04-16 PROCEDURE — 70486 CT MAXILLOFACIAL W/O DYE: CPT

## 2024-04-27 PROBLEM — R09.89 THROAT CLEARING: Status: RESOLVED | Noted: 2024-02-27 | Resolved: 2024-04-27

## 2024-05-15 ENCOUNTER — ANESTHESIA (OUTPATIENT)
Dept: GASTROENTEROLOGY | Facility: HOSPITAL | Age: 54
End: 2024-05-15

## 2024-05-15 ENCOUNTER — ANESTHESIA EVENT (OUTPATIENT)
Dept: GASTROENTEROLOGY | Facility: HOSPITAL | Age: 54
End: 2024-05-15

## 2024-05-15 ENCOUNTER — HOSPITAL ENCOUNTER (OUTPATIENT)
Dept: GASTROENTEROLOGY | Facility: HOSPITAL | Age: 54
Setting detail: OUTPATIENT SURGERY
Discharge: HOME | End: 2024-05-15
Attending: COLON & RECTAL SURGERY
Payer: COMMERCIAL

## 2024-05-15 VITALS
WEIGHT: 199 LBS | BODY MASS INDEX: 28.49 KG/M2 | HEIGHT: 70 IN | RESPIRATION RATE: 18 BRPM | DIASTOLIC BLOOD PRESSURE: 73 MMHG | HEART RATE: 64 BPM | OXYGEN SATURATION: 97 % | SYSTOLIC BLOOD PRESSURE: 112 MMHG | TEMPERATURE: 96.2 F

## 2024-05-15 DIAGNOSIS — Z12.11 SCREENING FOR COLON CANCER: ICD-10-CM

## 2024-05-15 PROCEDURE — 45385 COLONOSCOPY W/LESION REMOVAL: CPT | Performed by: COLON & RECTAL SURGERY

## 2024-05-15 PROCEDURE — 88305 TISSUE EXAM BY PATHOLOGIST: CPT | Performed by: PATHOLOGY

## 2024-05-15 RX ORDER — PROPOFOL 10 MG/ML
INJECTION, EMULSION INTRAVENOUS CONTINUOUS PRN
Status: DISCONTINUED | OUTPATIENT
Start: 2024-05-15 | End: 2024-05-15

## 2024-05-15 RX ORDER — PROPOFOL 10 MG/ML
INJECTION, EMULSION INTRAVENOUS AS NEEDED
Status: DISCONTINUED | OUTPATIENT
Start: 2024-05-15 | End: 2024-05-15

## 2024-05-15 RX ORDER — SODIUM CHLORIDE, SODIUM LACTATE, POTASSIUM CHLORIDE, CALCIUM CHLORIDE 600; 310; 30; 20 MG/100ML; MG/100ML; MG/100ML; MG/100ML
INJECTION, SOLUTION INTRAVENOUS CONTINUOUS PRN
Status: DISCONTINUED | OUTPATIENT
Start: 2024-05-15 | End: 2024-05-15

## 2024-05-15 RX ADMIN — PROPOFOL 25 MG: 10 INJECTION, EMULSION INTRAVENOUS at 12:15

## 2024-05-15 RX ADMIN — PROPOFOL 50 MG: 10 INJECTION, EMULSION INTRAVENOUS at 11:51

## 2024-05-15 RX ADMIN — PROPOFOL 120 MCG/KG/MIN: 10 INJECTION, EMULSION INTRAVENOUS at 11:52

## 2024-05-15 RX ADMIN — PROPOFOL 50 MG: 10 INJECTION, EMULSION INTRAVENOUS at 11:50

## 2024-05-15 RX ADMIN — PROPOFOL 100 MG: 10 INJECTION, EMULSION INTRAVENOUS at 11:49

## 2024-05-15 RX ADMIN — SODIUM CHLORIDE, SODIUM LACTATE, POTASSIUM CHLORIDE, AND CALCIUM CHLORIDE: .6; .31; .03; .02 INJECTION, SOLUTION INTRAVENOUS at 10:54

## 2024-05-15 RX ADMIN — PROPOFOL 25 MG: 10 INJECTION, EMULSION INTRAVENOUS at 12:17

## 2024-05-15 NOTE — ANESTHESIA PREPROCEDURE EVALUATION
Procedure:  COLONOSCOPY    Relevant Problems   CARDIO   (+) Mixed hyperlipidemia        Physical Exam    Airway    Mallampati score: II  TM Distance: >3 FB  Neck ROM: full     Dental   No notable dental hx     Cardiovascular  Rhythm: regular, Rate: normal    Pulmonary   Breath sounds clear to auscultation    Other Findings        Anesthesia Plan  ASA Score- 2     Anesthesia Type- IV sedation with anesthesia with ASA Monitors.         Additional Monitors:     Airway Plan:            Plan Factors-Exercise tolerance (METS): >4 METS.    Chart reviewed.   Existing labs reviewed. Patient summary reviewed.    Patient is not a current smoker.              Induction- intravenous.    Postoperative Plan-         Informed Consent- Anesthetic plan and risks discussed with patient.  I personally reviewed this patient with the CRNA. Discussed and agreed on the Anesthesia Plan with the CRNA..

## 2024-05-15 NOTE — ANESTHESIA POSTPROCEDURE EVALUATION
Post-Op Assessment Note    CV Status:  Stable    Pain management: adequate       Mental Status:  Sleepy   Hydration Status:  Euvolemic   PONV Controlled:  Controlled   Airway Patency:  Patent     Post Op Vitals Reviewed: Yes    No anethesia notable event occurred.    Staff: CRNA, Anesthesiologist               /65 (05/15/24 1222)    Temp (!) 96.2 °F (35.7 °C) (05/15/24 1222)    Pulse 62 (05/15/24 1222)   Resp 18 (05/15/24 1222)    SpO2 99 % (05/15/24 1222)

## 2024-05-15 NOTE — H&P
"History and Physical   Colon and Rectal Surgery   Mike Guerra 53 y.o. male MRN: [id number]  Unit/Bed#:  Encounter: [id number]  05/15/24   11:39 AM              ASSESSMENT:  Mike Guerra is a 53 y.o. male who presents for screening colonoscopy.  Screening colonoscopy,discussed in a face-to-face, personal, informed consent process, the benefits, alternatives, risks including not limited to bleeding, missed lesion, perforation requiring emergent surgery discussed/understood, signed consent.    PLAN:  Colonoscopy    No chief complaint on file.        History of Present Illness   HPI:  Mike Guerra is a 53 y.o. male who presents for screening colonoscopy.  Denies nausea/vomiting/abdominal pain, change in bowel habits, rectal bleeding, or other constitutional symptoms.       Historical Information   Past Medical History:   Diagnosis Date    Anxiety     \"general work related\"    Chronic pain disorder     Dislocated shoulder     originally approx 25 yrs ago//left    Exercises 3 to 4 times per week     4-5x/week    Hyperlipidemia     borderline    Impaired fasting glucose     Joint pain     Labral tear of shoulder     left with occas pain    Olecranon bursitis, left elbow     had surgery    Osteoarthritis     Shoulder arthritis     left    Vitamin D deficiency     pt not sure    Wears glasses     reading     Past Surgical History:   Procedure Laterality Date    ARTHROSCOPIC BICEPS TENODESIS Left 2/8/2018    Procedure: SHOULDER ARTHROSCOPY WITH BICEPS TENODESIS;  Surgeon: Sj Pierce MD;  Location: [facility];  Service: Orthopedics    FL INJECTION LEFT SHOULDER (ARTHROGRAM)  1/8/2020    FL INJECTION RIGHT WRIST (ARTHROGRAM)  8/25/2021    ORTHOPEDIC SURGERY      NH EXCISION OLECRANON BURSA Left 10/3/2019    Procedure: EXCISION BURSA OLECRANON ELBOW;  Surgeon: Anthony Dao MD;  Location:  MAIN OR;  Service: Orthopedics    NH SURGICAL ARTHROSCOPY SHOULDER REPAIR SLAP LESION Left 2/10/2020    Procedure: " ARTHROSCOPY SHOULDER, posterior labral repair;  Surgeon: Ruben Ferraro MD;  Location: [facility] OR;  Service: Orthopedics    ROTATOR CUFF REPAIR      ROTATOR CUFF REPAIR Left     x2 last one     SEPTOPLASTY      WISDOM TOOTH EXTRACTION         Meds/Allergies     Not in a hospital admission.      Current Outpatient Medications:     atorvastatin (LIPITOR) 10 mg tablet, TAKE 1 TABLET BY MOUTH EVERY DAY AT BEDTIME, Disp: 90 tablet, Rfl: 3    Cholecalciferol (VITAMIN D) 2000 units CAPS, Take by mouth, Disp: , Rfl:     famotidine (PEPCID) 40 MG tablet, Take 1 tablet (40 mg total) by mouth daily at bedtime, Disp: 90 tablet, Rfl: 1    finasteride (PROPECIA) 1 MG tablet, Take 1 tablet (1 mg total) by mouth daily, Disp: 90 tablet, Rfl: 3    Multiple Vitamin (MULTIVITAMIN) tablet, Take 1 tablet by mouth daily, Disp: , Rfl:     Omega-3 Fatty Acids (FISH OIL) 1,000 mg, Take 1,000 mg by mouth daily, Disp: , Rfl:     omeprazole (PriLOSEC) 40 MG capsule, 1 tab 30-60 minutes po before breakfast, Disp: 90 capsule, Rfl: 1    tretinoin (RETIN-A) 0.025 % cream, APPLY EVERY NIGHT AT BEDTIME, Disp: , Rfl:     No Known Allergies      Social History   Social History     Substance and Sexual Activity   Alcohol Use Yes    Alcohol/week: 7.0 standard drinks of alcohol    Types: 7 Glasses of wine per week    Comment: 1 drink/day     Social History     Substance and Sexual Activity   Drug Use No     Social History     Tobacco Use   Smoking Status Former    Current packs/day: 0.00    Types: Cigarettes    Start date: 1992    Quit date: 2007    Years since quittin.2    Passive exposure: Never   Smokeless Tobacco Never         Family History:   Family History   Problem Relation Age of Onset    No Known Problems Mother     Heart attack Father 70         Objective     Current Vitals:   Blood Pressure: 113/71 (05/15/24 1120)  Pulse: 59 (05/15/24 1120)  Temperature: (!) 97.2 °F (36.2 °C) (05/15/24 1120)  Temp Source: Tympanic (05/15/24  "1120)  Respirations: 18 (05/15/24 1120)  Height: 5' 10\" (177.8 cm) (05/15/24 1120)  Weight - Scale: 90.3 kg (199 lb) (05/15/24 1120)  SpO2: 100 % (05/15/24 1120)  No intake or output data in the 24 hours ending 05/15/24 1139    Physical Exam:  General:no distress  Eyes:perrla/eomi  ENT:moist mucus membranes  Neck:supple  Pulm:no increased work of breathing  CV:sinus  Abdomen:soft,nontender  Extremities:no edema  "

## 2024-05-17 PROCEDURE — 88305 TISSUE EXAM BY PATHOLOGIST: CPT | Performed by: PATHOLOGY

## 2024-06-06 ENCOUNTER — APPOINTMENT (OUTPATIENT)
Dept: LAB | Facility: MEDICAL CENTER | Age: 54
End: 2024-06-06
Payer: COMMERCIAL

## 2024-06-06 DIAGNOSIS — E55.9 VITAMIN D DEFICIENCY: ICD-10-CM

## 2024-06-06 DIAGNOSIS — F41.8 DEPRESSION WITH ANXIETY: ICD-10-CM

## 2024-06-06 DIAGNOSIS — Z12.5 SCREENING FOR PROSTATE CANCER: ICD-10-CM

## 2024-06-06 DIAGNOSIS — R73.01 IFG (IMPAIRED FASTING GLUCOSE): ICD-10-CM

## 2024-06-06 DIAGNOSIS — Z00.00 ANNUAL PHYSICAL EXAM: ICD-10-CM

## 2024-06-06 DIAGNOSIS — L64.9 MALE PATTERN BALDNESS: ICD-10-CM

## 2024-06-06 DIAGNOSIS — E78.2 MIXED HYPERLIPIDEMIA: ICD-10-CM

## 2024-06-06 LAB
BASOPHILS # BLD AUTO: 0.03 THOUSANDS/ÂΜL (ref 0–0.1)
BASOPHILS NFR BLD AUTO: 1 % (ref 0–1)
EOSINOPHIL # BLD AUTO: 0.09 THOUSAND/ÂΜL (ref 0–0.61)
EOSINOPHIL NFR BLD AUTO: 2 % (ref 0–6)
ERYTHROCYTE [DISTWIDTH] IN BLOOD BY AUTOMATED COUNT: 12.1 % (ref 11.6–15.1)
EST. AVERAGE GLUCOSE BLD GHB EST-MCNC: 114 MG/DL
HBA1C MFR BLD: 5.6 %
HCT VFR BLD AUTO: 47 % (ref 36.5–49.3)
HGB BLD-MCNC: 15.4 G/DL (ref 12–17)
IMM GRANULOCYTES # BLD AUTO: 0.01 THOUSAND/UL (ref 0–0.2)
IMM GRANULOCYTES NFR BLD AUTO: 0 % (ref 0–2)
LYMPHOCYTES # BLD AUTO: 1.61 THOUSANDS/ÂΜL (ref 0.6–4.47)
LYMPHOCYTES NFR BLD AUTO: 35 % (ref 14–44)
MCH RBC QN AUTO: 30.2 PG (ref 26.8–34.3)
MCHC RBC AUTO-ENTMCNC: 32.8 G/DL (ref 31.4–37.4)
MCV RBC AUTO: 92 FL (ref 82–98)
MONOCYTES # BLD AUTO: 0.44 THOUSAND/ÂΜL (ref 0.17–1.22)
MONOCYTES NFR BLD AUTO: 9 % (ref 4–12)
NEUTROPHILS # BLD AUTO: 2.49 THOUSANDS/ÂΜL (ref 1.85–7.62)
NEUTS SEG NFR BLD AUTO: 53 % (ref 43–75)
NRBC BLD AUTO-RTO: 0 /100 WBCS
PLATELET # BLD AUTO: 316 THOUSANDS/UL (ref 149–390)
PMV BLD AUTO: 10.1 FL (ref 8.9–12.7)
PSA SERPL-MCNC: 0.65 NG/ML (ref 0–4)
RBC # BLD AUTO: 5.1 MILLION/UL (ref 3.88–5.62)
WBC # BLD AUTO: 4.67 THOUSAND/UL (ref 4.31–10.16)

## 2024-06-06 PROCEDURE — G0103 PSA SCREENING: HCPCS

## 2024-06-06 PROCEDURE — 85025 COMPLETE CBC W/AUTO DIFF WBC: CPT

## 2024-06-06 PROCEDURE — 80053 COMPREHEN METABOLIC PANEL: CPT

## 2024-06-06 PROCEDURE — 84402 ASSAY OF FREE TESTOSTERONE: CPT

## 2024-06-06 PROCEDURE — 83036 HEMOGLOBIN GLYCOSYLATED A1C: CPT

## 2024-06-06 PROCEDURE — 36415 COLL VENOUS BLD VENIPUNCTURE: CPT

## 2024-06-06 PROCEDURE — 80061 LIPID PANEL: CPT

## 2024-06-06 PROCEDURE — 84403 ASSAY OF TOTAL TESTOSTERONE: CPT

## 2024-06-07 LAB
ALBUMIN SERPL BCP-MCNC: 5 G/DL (ref 3.5–5)
ALP SERPL-CCNC: 44 U/L (ref 34–104)
ALT SERPL W P-5'-P-CCNC: 27 U/L (ref 7–52)
ANION GAP SERPL CALCULATED.3IONS-SCNC: 13 MMOL/L (ref 4–13)
AST SERPL W P-5'-P-CCNC: 29 U/L (ref 13–39)
BILIRUB SERPL-MCNC: 0.77 MG/DL (ref 0.2–1)
BUN SERPL-MCNC: 28 MG/DL (ref 5–25)
CALCIUM SERPL-MCNC: 10.2 MG/DL (ref 8.4–10.2)
CHLORIDE SERPL-SCNC: 103 MMOL/L (ref 96–108)
CHOLEST SERPL-MCNC: 191 MG/DL
CO2 SERPL-SCNC: 23 MMOL/L (ref 21–32)
CREAT SERPL-MCNC: 1.27 MG/DL (ref 0.6–1.3)
GFR SERPL CREATININE-BSD FRML MDRD: 64 ML/MIN/1.73SQ M
GLUCOSE P FAST SERPL-MCNC: 85 MG/DL (ref 65–99)
HDLC SERPL-MCNC: 49 MG/DL
LDLC SERPL CALC-MCNC: 117 MG/DL (ref 0–100)
POTASSIUM SERPL-SCNC: 4.6 MMOL/L (ref 3.5–5.3)
PROT SERPL-MCNC: 7.2 G/DL (ref 6.4–8.4)
SODIUM SERPL-SCNC: 139 MMOL/L (ref 135–147)
TESTOST FREE SERPL-MCNC: 9.7 PG/ML (ref 7.2–24)
TESTOST SERPL-MCNC: 254 NG/DL (ref 264–916)
TRIGL SERPL-MCNC: 123 MG/DL

## 2024-06-10 ENCOUNTER — TELEPHONE (OUTPATIENT)
Dept: FAMILY MEDICINE CLINIC | Facility: CLINIC | Age: 54
End: 2024-06-10

## 2024-06-10 NOTE — TELEPHONE ENCOUNTER
Patient aware of results ----- Message from Franky Dove MD sent at 6/7/2024  5:38 PM EDT -----  Testosterone 254 low but stable.   Electrolyte normal  Liver enzymes normal  Kidney function decreased. Please ask pt to keep hydrated. Avoid motrin/ibuprofen/aleve NSAIDs nephrotoxic agents. Will monitor next time.   Lipid panel normal  hgA1C 5.6 normal  PSA 0.65 normal  White blood cell normal  Hemoglobin normal  Platelet normal

## 2024-08-29 ENCOUNTER — TELEPHONE (OUTPATIENT)
Age: 54
End: 2024-08-29

## 2024-08-29 NOTE — TELEPHONE ENCOUNTER
Patient is calling regarding a bill he received for his visit in February, he states that he was seen for an annual physical but because of how the bill was sent it looks as he if he requested a specialty and he is being billed an extra $20, he is not happy with this bill and is requesting additional clarification from the provider to explain why it was billed the way it was to his insurance  He also stated he will be transferring his PCP to a different practice if this is not fixed or taken care of, he feels he should not be responsible for an additional bill  Please review and advise

## 2024-08-29 NOTE — TELEPHONE ENCOUNTER
PATIENT IS REQUESTING YOU REVIEW THE CODING TO HIS VISIT DOES NOT CONSIDER THAT HIS APPOINTMENT WAS OUT OF A PHYSICAL GUIDELINE

## 2024-08-30 NOTE — TELEPHONE ENCOUNTER
Tried to call pt but nobody answer. Left message. I explained to pt why there were 2 codes for the visit. However, I talked with Cathy, will get rid off $20 for patient.

## 2024-09-07 DIAGNOSIS — L64.9 MALE PATTERN BALDNESS: ICD-10-CM

## 2024-09-07 RX ORDER — FINASTERIDE 1 MG/1
1 TABLET, FILM COATED ORAL DAILY
Qty: 90 TABLET | Refills: 3 | Status: SHIPPED | OUTPATIENT
Start: 2024-09-07

## (undated) DEVICE — ABDOMINAL PAD: Brand: DERMACEA

## (undated) DEVICE — GLOVE SRG BIOGEL 7.5

## (undated) DEVICE — WEBRIL COTTON STERILE 6IN

## (undated) DEVICE — POSITIONER TRIMANO LIMB BEACH CHAIR

## (undated) DEVICE — SCD SEQUENTIAL COMPRESSION COMFORT SLEEVE MEDIUM KNEE LENGTH: Brand: KENDALL SCD

## (undated) DEVICE — INTENDED FOR TISSUE SEPARATION, AND OTHER PROCEDURES THAT REQUIRE A SHARP SURGICAL BLADE TO PUNCTURE OR CUT.: Brand: BARD-PARKER SAFETY BLADES SIZE 11, STERILE

## (undated) DEVICE — GAUZE SPONGES,16 PLY: Brand: CURITY

## (undated) DEVICE — CABLE BIPOLAR DISP MEGADYNE

## (undated) DEVICE — INTENDED FOR TISSUE SEPARATION, AND OTHER PROCEDURES THAT REQUIRE A SHARP SURGICAL BLADE TO PUNCTURE OR CUT.: Brand: BARD-PARKER SAFETY BLADES SIZE 15, STERILE

## (undated) DEVICE — MEDI-VAC TUBING CONNECTOR 6-IN-1 STRAIGHT: Brand: CARDINAL HEALTH

## (undated) DEVICE — CURITY NON-ADHERENT STRIPS: Brand: CURITY

## (undated) DEVICE — TUBING ARTHROSCOPY REDUCE PATIENT

## (undated) DEVICE — ARM SLING: Brand: DEROYAL

## (undated) DEVICE — CHLORAPREP HI-LITE 26ML ORANGE

## (undated) DEVICE — STRL ALLENTOWN HIP SHOULDER PK: Brand: CARDINAL HEALTH

## (undated) DEVICE — GAUZE SPONGES,USP TYPE VII GAUZE, 12 PLY: Brand: CURITY

## (undated) DEVICE — KERLIX BANDAGE ROLL: Brand: KERLIX

## (undated) DEVICE — BLADE SHAVER DISSECTOR 4MM 13CM COOLCUT

## (undated) DEVICE — ASTOUND IMPERVIOUS SURGICAL GOWN: Brand: CONVERTORS

## (undated) DEVICE — THREADED CLEAR CANNULA WITH OBTURATOR 5.5MM X 75MM

## (undated) DEVICE — UNDYED MONOFILAMENT (POLYDIOXANONE), ABSORBABLE SURGICAL SUTURE: Brand: PDS

## (undated) DEVICE — DRAPE SHEET THREE QUARTER

## (undated) DEVICE — PADDING,UNDERCAST,COTTON, 3X4YD STERILE: Brand: MEDLINE

## (undated) DEVICE — PASSER SUT 25DEG CRV LT QUICKPASS LASSO

## (undated) DEVICE — 3M™ STERI-DRAPE™ U-DRAPE 1015: Brand: STERI-DRAPE™

## (undated) DEVICE — BLADE SHAVER EXCALIBUR 4MM 13CM COOLCUT

## (undated) DEVICE — COBAN 4 IN STERILE

## (undated) DEVICE — TUBING ARTHROSCOPIC WAVE  MAIN PUMP

## (undated) DEVICE — BURR  OVAL 4MM 13CM 8 FLUTE COOLCUT

## (undated) DEVICE — CYSTO TUBING TUR Y IRRIGATION

## (undated) DEVICE — SYRINGE 20ML LL

## (undated) DEVICE — VAPR COOLPULSE 90 ELECTRODE 90 DEGREES SUCTION WITH INTEGRATED HANDPIECE: Brand: VAPR COOLPULSE

## (undated) DEVICE — BLADE SHAVER TORPEDO 4MM 13CM  COOLCUT

## (undated) DEVICE — SUT FIBERSTICK #2 50IN BLUE

## (undated) DEVICE — BLADE SHAVER EXCALIBUR 5.5MM 130CM COOLCUT

## (undated) DEVICE — PASSER SUT 25DEG CRV RT QUICKPASS LASSO

## (undated) DEVICE — GLOVE INDICATOR PI UNDERGLOVE SZ 7.5 BLUE

## (undated) DEVICE — SUT MONOCRYL 2-0 SH 27 IN Y417H

## (undated) DEVICE — 3M™ MICROFOAM™ SURGICAL TAPE 4 ROLLS/CARTON 6 CARTONS/CASE 1528-3: Brand: 3M™ MICROFOAM™

## (undated) DEVICE — THREADED CLEAR CANNULA WITH OBTURATOR 7MM X 75MM

## (undated) DEVICE — OCCLUSIVE GAUZE STRIP,3% BISMUTH TRIBROMOPHENATE IN PETROLATUM BLEND: Brand: XEROFORM

## (undated) DEVICE — INTENDED FOR TISSUE SEPARATION, AND OTHER PROCEDURES THAT REQUIRE A SHARP SURGICAL BLADE TO PUNCTURE OR CUT.: Brand: BARD-PARKER ® CARBON RIB-BACK BLADES

## (undated) DEVICE — REAMER PILOTED HEAD 8MM

## (undated) DEVICE — SPLINT 4 IN X 15 FT DYNACAST S

## (undated) DEVICE — ACE WRAP 4 IN STERILE

## (undated) DEVICE — STOCKINETTE 2P PREROLLD 4X48

## (undated) DEVICE — IMPERVIOUS STOCKINETTE: Brand: DEROYAL

## (undated) DEVICE — NEEDLE SPINAL18G X 3.5 IN QUINCKE

## (undated) DEVICE — FIBERTAPE 2MM X 7IN AR-7237-7

## (undated) DEVICE — KIT DISP FIBERTAK STRIAGHT SPEAR

## (undated) DEVICE — NEEDLE 18 G X 1 1/2

## (undated) DEVICE — LABEL MEDICATION STERILE 2 YELLOW LIDOCAINE 2 BLUE MARCAINE 2 ORANGE HEPARIN

## (undated) DEVICE — TUBING SUCTION 5MM X 12 FT

## (undated) DEVICE — GLOVE INDICATOR PI UNDERGLOVE SZ 8.5 BLUE

## (undated) DEVICE — STERILE POLYISOPRENE POWDER-FREE SURGICAL GLOVES WITH EMOLLIENT COATING: Brand: PROTEXIS

## (undated) DEVICE — VAPR COOLPULSE 90 ELECTRODE WITH HAND CONTROLS 90 DEGREES SUCTION WITH INTEGRATED HANDPIECE: Brand: VAPR COOLPULSE

## (undated) DEVICE — 3M™ STERI-STRIP™ REINFORCED ADHESIVE SKIN CLOSURES, R1547, 1/2 IN X 4 IN (12 MM X 100 MM), 6 STRIPS/ENVELOPE: Brand: 3M™ STERI-STRIP™

## (undated) DEVICE — GLOVE INDICATOR PI UNDERGLOVE SZ 6.5 BLUE

## (undated) DEVICE — GLOVE SRG BIOGEL 6.5

## (undated) DEVICE — GLOVE SRG BIOGEL 8

## (undated) DEVICE — STERILE POLYISOPRENE POWDER-FREE SURGICAL GLOVES: Brand: PROTEXIS

## (undated) DEVICE — 3M™ TEGADERM™ TRANSPARENT FILM DRESSING FRAME STYLE, 1626W, 4 IN X 4-3/4 IN (10 CM X 12 CM), 50/CT 4CT/CASE: Brand: 3M™ TEGADERM™

## (undated) DEVICE — PACK ARTHROSCOPY

## (undated) DEVICE — CUFF TOURNIQUET DISP SZ18

## (undated) DEVICE — BETHLEHEM UNIVERSAL  MIONR EXT: Brand: CARDINAL HEALTH

## (undated) DEVICE — SUT ETHILON 3-0 FSLX 30 IN 1673H

## (undated) DEVICE — TUBING EXTENSION 6 FT CONTIN WAVE

## (undated) DEVICE — 4-PORT MANIFOLD: Brand: NEPTUNE 2

## (undated) DEVICE — CANNULA 5.75 X 70MM BARREL SHAPED BOWL

## (undated) DEVICE — COBAN 6 IN STERILE

## (undated) DEVICE — PUDDLE VAC

## (undated) DEVICE — PADDING CAST 3IN COTTON STRL

## (undated) DEVICE — MAYO STAND COVER: Brand: CONVERTORS

## (undated) DEVICE — VESSEL LOOP MAXI - RED

## (undated) DEVICE — SMARTGOWN SURGICAL GOWN, LARGE: Brand: CONVERTORS

## (undated) DEVICE — BUCKET PLASTER DISPOSABLE